# Patient Record
Sex: FEMALE | Employment: FULL TIME | URBAN - METROPOLITAN AREA
[De-identification: names, ages, dates, MRNs, and addresses within clinical notes are randomized per-mention and may not be internally consistent; named-entity substitution may affect disease eponyms.]

---

## 2017-01-17 ENCOUNTER — OFFICE VISIT (OUTPATIENT)
Dept: BARIATRICS/WEIGHT MGMT | Age: 37
End: 2017-01-17

## 2017-01-17 VITALS
OXYGEN SATURATION: 97 % | SYSTOLIC BLOOD PRESSURE: 107 MMHG | DIASTOLIC BLOOD PRESSURE: 58 MMHG | BODY MASS INDEX: 45.03 KG/M2 | HEIGHT: 67 IN | WEIGHT: 286.9 LBS | HEART RATE: 84 BPM

## 2017-01-17 VITALS
SYSTOLIC BLOOD PRESSURE: 107 MMHG | HEIGHT: 67 IN | HEART RATE: 84 BPM | WEIGHT: 286.9 LBS | BODY MASS INDEX: 45.03 KG/M2 | DIASTOLIC BLOOD PRESSURE: 58 MMHG

## 2017-01-17 DIAGNOSIS — G47.33 OSA (OBSTRUCTIVE SLEEP APNEA): ICD-10-CM

## 2017-01-17 DIAGNOSIS — E66.01 MORBID OBESITY WITH BMI OF 40.0-44.9, ADULT (HCC): Primary | ICD-10-CM

## 2017-01-17 DIAGNOSIS — E66.01 OBESITY, CLASS III, BMI 40-49.9 (MORBID OBESITY) (HCC): Primary | ICD-10-CM

## 2017-01-17 DIAGNOSIS — Z71.3 ENCOUNTER FOR NUTRITIONAL COUNSELING: ICD-10-CM

## 2017-01-17 PROCEDURE — 99214 OFFICE O/P EST MOD 30 MIN: CPT | Performed by: NURSE PRACTITIONER

## 2017-01-17 PROCEDURE — 99401 PREV MED CNSL INDIV APPRX 15: CPT | Performed by: NURSE PRACTITIONER

## 2017-01-17 PROCEDURE — 99999 PR OFFICE/OUTPT VISIT,PROCEDURE ONLY: CPT

## 2017-01-17 ASSESSMENT — ENCOUNTER SYMPTOMS
ABDOMINAL PAIN: 0
CHEST TIGHTNESS: 0
NAUSEA: 0
RHINORRHEA: 0
SHORTNESS OF BREATH: 0
WHEEZING: 0
TROUBLE SWALLOWING: 0
EYE PAIN: 0
ABDOMINAL DISTENTION: 0
DIARRHEA: 0

## 2017-02-07 ENCOUNTER — HOSPITAL ENCOUNTER (OUTPATIENT)
Dept: GENERAL RADIOLOGY | Age: 37
Discharge: HOME | End: 2017-02-07
Attending: INTERNAL MEDICINE | Admitting: INTERNAL MEDICINE

## 2017-02-07 DIAGNOSIS — R06.02 SOB (SHORTNESS OF BREATH): ICD-10-CM

## 2017-02-15 ENCOUNTER — OFFICE VISIT (OUTPATIENT)
Dept: BARIATRICS/WEIGHT MGMT | Age: 37
End: 2017-02-15

## 2017-02-15 VITALS
WEIGHT: 286.7 LBS | BODY MASS INDEX: 45 KG/M2 | HEIGHT: 67 IN | DIASTOLIC BLOOD PRESSURE: 73 MMHG | SYSTOLIC BLOOD PRESSURE: 117 MMHG | RESPIRATION RATE: 12 BRPM | HEART RATE: 86 BPM

## 2017-02-15 DIAGNOSIS — E66.01 MORBID OBESITY WITH BMI OF 40.0-44.9, ADULT (HCC): Primary | ICD-10-CM

## 2017-02-15 PROCEDURE — 99214 OFFICE O/P EST MOD 30 MIN: CPT | Performed by: SURGERY

## 2017-02-17 ENCOUNTER — HOSPITAL ENCOUNTER (OUTPATIENT)
Dept: CARDIOLOGY | Age: 37
Discharge: HOME | End: 2017-03-18
Attending: INTERNAL MEDICINE | Admitting: INTERNAL MEDICINE

## 2017-02-17 DIAGNOSIS — K94.31: ICD-10-CM

## 2017-03-08 ENCOUNTER — HOSPITAL ENCOUNTER (OUTPATIENT)
Dept: NUCLEAR MEDICINE | Age: 37
Discharge: HOME | End: 2017-03-08
Attending: INTERNAL MEDICINE | Admitting: INTERNAL MEDICINE

## 2017-03-08 DIAGNOSIS — K94.31: ICD-10-CM

## 2017-03-08 LAB
LV EF: 53 %
LV EF: 74 %
LVEF MODALITY: NORMAL
LVEF MODALITY: NORMAL

## 2017-03-08 RX ADMIN — Medication 30 MILLICURIE: at 12:37

## 2017-03-08 RX ADMIN — Medication 10 MILLICURIE: at 12:37

## 2017-03-14 ENCOUNTER — OFFICE VISIT (OUTPATIENT)
Dept: BARIATRICS/WEIGHT MGMT | Age: 37
End: 2017-03-14

## 2017-03-14 VITALS
SYSTOLIC BLOOD PRESSURE: 108 MMHG | HEART RATE: 95 BPM | BODY MASS INDEX: 44.21 KG/M2 | WEIGHT: 281.7 LBS | DIASTOLIC BLOOD PRESSURE: 63 MMHG | HEIGHT: 67 IN | RESPIRATION RATE: 16 BRPM

## 2017-03-14 DIAGNOSIS — E66.01 MORBID OBESITY WITH BMI OF 40.0-44.9, ADULT (HCC): Primary | ICD-10-CM

## 2017-03-14 DIAGNOSIS — M25.562 CHRONIC PAIN OF BOTH KNEES: ICD-10-CM

## 2017-03-14 DIAGNOSIS — A04.8 HELICOBACTER PYLORI (H. PYLORI): ICD-10-CM

## 2017-03-14 DIAGNOSIS — I10 HTN, GOAL BELOW 140/90: ICD-10-CM

## 2017-03-14 DIAGNOSIS — M25.561 CHRONIC PAIN OF BOTH KNEES: ICD-10-CM

## 2017-03-14 DIAGNOSIS — G89.29 CHRONIC PAIN OF BOTH KNEES: ICD-10-CM

## 2017-03-14 DIAGNOSIS — Z01.818 PRE-OP EVALUATION: ICD-10-CM

## 2017-03-14 DIAGNOSIS — G47.33 OSA (OBSTRUCTIVE SLEEP APNEA): ICD-10-CM

## 2017-03-14 PROCEDURE — 99215 OFFICE O/P EST HI 40 MIN: CPT | Performed by: NURSE PRACTITIONER

## 2017-03-14 RX ORDER — AMOXICILLIN 500 MG/1
1000 TABLET, FILM COATED ORAL 2 TIMES DAILY
Qty: 56 TABLET | Refills: 0 | Status: SHIPPED | OUTPATIENT
Start: 2017-03-14 | End: 2017-03-28

## 2017-03-14 RX ORDER — CLARITHROMYCIN 500 MG/1
500 TABLET, COATED ORAL 2 TIMES DAILY
Qty: 28 TABLET | Refills: 0 | Status: SHIPPED | OUTPATIENT
Start: 2017-03-14 | End: 2017-03-28

## 2017-03-14 ASSESSMENT — ENCOUNTER SYMPTOMS
EYE PAIN: 0
ABDOMINAL DISTENTION: 0
DIARRHEA: 0
RHINORRHEA: 0
BACK PAIN: 1
SHORTNESS OF BREATH: 0
TROUBLE SWALLOWING: 0
NAUSEA: 0
CHEST TIGHTNESS: 0
WHEEZING: 0
ABDOMINAL PAIN: 0

## 2017-04-26 ENCOUNTER — OFFICE VISIT (OUTPATIENT)
Dept: BARIATRICS/WEIGHT MGMT | Age: 37
End: 2017-04-26

## 2017-04-26 VITALS
BODY MASS INDEX: 43.63 KG/M2 | WEIGHT: 278 LBS | DIASTOLIC BLOOD PRESSURE: 63 MMHG | SYSTOLIC BLOOD PRESSURE: 119 MMHG | HEART RATE: 100 BPM | HEIGHT: 67 IN

## 2017-04-26 DIAGNOSIS — R10.11 RUQ ABDOMINAL PAIN: Primary | ICD-10-CM

## 2017-04-26 DIAGNOSIS — E66.01 MORBID OBESITY WITH BMI OF 40.0-44.9, ADULT (HCC): ICD-10-CM

## 2017-04-26 PROCEDURE — 99214 OFFICE O/P EST MOD 30 MIN: CPT | Performed by: SURGERY

## 2017-05-05 ENCOUNTER — HOSPITAL ENCOUNTER (OUTPATIENT)
Dept: ULTRASOUND IMAGING | Age: 37
Discharge: HOME | End: 2017-05-05
Attending: SURGERY | Admitting: SURGERY

## 2017-05-05 DIAGNOSIS — R10.11 ABDOMINAL PAIN, RIGHT UPPER QUADRANT: ICD-10-CM

## 2017-06-05 ENCOUNTER — OFFICE VISIT (OUTPATIENT)
Dept: BARIATRICS/WEIGHT MGMT | Age: 37
End: 2017-06-05

## 2017-06-05 VITALS
HEART RATE: 87 BPM | WEIGHT: 280.7 LBS | SYSTOLIC BLOOD PRESSURE: 124 MMHG | HEIGHT: 67 IN | BODY MASS INDEX: 44.06 KG/M2 | DIASTOLIC BLOOD PRESSURE: 76 MMHG

## 2017-06-05 DIAGNOSIS — G47.33 OSA (OBSTRUCTIVE SLEEP APNEA): Primary | ICD-10-CM

## 2017-06-05 DIAGNOSIS — N39.3 SUI (STRESS URINARY INCONTINENCE, FEMALE): ICD-10-CM

## 2017-06-05 DIAGNOSIS — G89.29 CHRONIC PAIN OF BOTH KNEES: ICD-10-CM

## 2017-06-05 DIAGNOSIS — I10 HTN, GOAL BELOW 140/90: ICD-10-CM

## 2017-06-05 DIAGNOSIS — M25.561 CHRONIC PAIN OF BOTH KNEES: ICD-10-CM

## 2017-06-05 DIAGNOSIS — F41.9 ANXIETY: ICD-10-CM

## 2017-06-05 DIAGNOSIS — E66.01 MORBID OBESITY WITH BMI OF 40.0-44.9, ADULT (HCC): ICD-10-CM

## 2017-06-05 DIAGNOSIS — M25.562 CHRONIC PAIN OF BOTH KNEES: ICD-10-CM

## 2017-06-05 PROCEDURE — 99214 OFFICE O/P EST MOD 30 MIN: CPT | Performed by: NURSE PRACTITIONER

## 2017-06-05 ASSESSMENT — ENCOUNTER SYMPTOMS
EYE PAIN: 0
BACK PAIN: 1
WHEEZING: 0
SHORTNESS OF BREATH: 0
TROUBLE SWALLOWING: 0
DIARRHEA: 0
NAUSEA: 0
RHINORRHEA: 0
ABDOMINAL PAIN: 0
CHEST TIGHTNESS: 0
COUGH: 0
ABDOMINAL DISTENTION: 0

## 2017-06-28 PROBLEM — G47.33 MODERATE OBSTRUCTIVE SLEEP APNEA: Status: ACTIVE | Noted: 2017-06-28

## 2017-07-20 ENCOUNTER — OFFICE VISIT (OUTPATIENT)
Dept: BARIATRICS/WEIGHT MGMT | Age: 37
End: 2017-07-20

## 2017-07-20 VITALS
HEIGHT: 67 IN | SYSTOLIC BLOOD PRESSURE: 121 MMHG | BODY MASS INDEX: 43.18 KG/M2 | HEART RATE: 95 BPM | DIASTOLIC BLOOD PRESSURE: 79 MMHG | WEIGHT: 275.1 LBS

## 2017-07-20 DIAGNOSIS — E66.01 MORBID OBESITY WITH BMI OF 40.0-44.9, ADULT (HCC): Primary | ICD-10-CM

## 2017-07-20 PROCEDURE — 99214 OFFICE O/P EST MOD 30 MIN: CPT | Performed by: SURGERY

## 2017-07-20 RX ORDER — TRAMADOL HYDROCHLORIDE 50 MG/1
50 TABLET ORAL EVERY 6 HOURS PRN
COMMUNITY
End: 2017-08-29

## 2017-07-20 RX ORDER — HYDROCODONE BITARTRATE AND ACETAMINOPHEN 5; 325 MG/1; MG/1
1 TABLET ORAL EVERY 8 HOURS PRN
Status: ON HOLD | COMMUNITY
End: 2017-09-06 | Stop reason: HOSPADM

## 2017-08-15 ENCOUNTER — TELEPHONE (OUTPATIENT)
Dept: BARIATRICS/WEIGHT MGMT | Age: 37
End: 2017-08-15

## 2017-08-21 ENCOUNTER — OFFICE VISIT (OUTPATIENT)
Dept: BARIATRICS/WEIGHT MGMT | Age: 37
End: 2017-08-21

## 2017-08-21 VITALS
BODY MASS INDEX: 43.27 KG/M2 | SYSTOLIC BLOOD PRESSURE: 145 MMHG | HEIGHT: 67 IN | HEART RATE: 107 BPM | DIASTOLIC BLOOD PRESSURE: 73 MMHG | WEIGHT: 275.7 LBS

## 2017-08-21 DIAGNOSIS — E66.01 MORBID OBESITY WITH BMI OF 40.0-44.9, ADULT (HCC): Primary | ICD-10-CM

## 2017-08-21 PROCEDURE — 99999 PR OFFICE/OUTPT VISIT,PROCEDURE ONLY: CPT

## 2017-08-21 RX ORDER — FEEDER CONTAINER WITH PUMP SET
1 EACH MISCELLANEOUS 4 TIMES DAILY
Qty: 120 CAN | Refills: 0 | Status: SHIPPED | OUTPATIENT
Start: 2017-08-21 | End: 2017-12-18

## 2017-08-29 ENCOUNTER — TELEPHONE (OUTPATIENT)
Dept: BARIATRICS/WEIGHT MGMT | Age: 37
End: 2017-08-29

## 2017-08-29 ENCOUNTER — HOSPITAL ENCOUNTER (OUTPATIENT)
Dept: PREADMISSION TESTING | Age: 37
Discharge: HOME | End: 2017-08-29
Attending: SURGERY | Admitting: SURGERY

## 2017-08-29 VITALS
SYSTOLIC BLOOD PRESSURE: 113 MMHG | OXYGEN SATURATION: 97 % | HEIGHT: 68 IN | HEART RATE: 88 BPM | TEMPERATURE: 96.9 F | WEIGHT: 266.25 LBS | DIASTOLIC BLOOD PRESSURE: 77 MMHG | RESPIRATION RATE: 16 BRPM | BODY MASS INDEX: 40.35 KG/M2

## 2017-08-29 LAB
ANION GAP SERPL CALCULATED.3IONS-SCNC: 14 MMOL/L (ref 4–16)
BASOPHILS ABSOLUTE: 0.1 K/CU MM
BASOPHILS RELATIVE PERCENT: 0.7 % (ref 0–1)
BUN BLDV-MCNC: 13 MG/DL (ref 6–23)
CALCIUM SERPL-MCNC: 9.4 MG/DL (ref 8.3–10.6)
CHLORIDE BLD-SCNC: 100 MMOL/L (ref 99–110)
CO2: 20 MMOL/L (ref 21–32)
CREAT SERPL-MCNC: 0.7 MG/DL (ref 0.6–1.1)
DIFFERENTIAL TYPE: ABNORMAL
EOSINOPHILS ABSOLUTE: 0.3 K/CU MM
EOSINOPHILS RELATIVE PERCENT: 4.1 % (ref 0–3)
GFR AFRICAN AMERICAN: >60 ML/MIN/1.73M2
GFR NON-AFRICAN AMERICAN: >60 ML/MIN/1.73M2
GLUCOSE BLD-MCNC: 95 MG/DL (ref 70–140)
HCT VFR BLD CALC: 42.5 % (ref 37–47)
HEMOGLOBIN: 13.1 GM/DL (ref 12.5–16)
IMMATURE NEUTROPHIL %: 0.1 % (ref 0–0.43)
LYMPHOCYTES ABSOLUTE: 3.2 K/CU MM
LYMPHOCYTES RELATIVE PERCENT: 45.1 % (ref 24–44)
MCH RBC QN AUTO: 27.6 PG (ref 27–31)
MCHC RBC AUTO-ENTMCNC: 30.8 % (ref 32–36)
MCV RBC AUTO: 89.5 FL (ref 78–100)
MONOCYTES ABSOLUTE: 0.5 K/CU MM
MONOCYTES RELATIVE PERCENT: 7.2 % (ref 0–4)
NUCLEATED RBC %: 0 %
PDW BLD-RTO: 12.6 % (ref 11.7–14.9)
PLATELET # BLD: 306 K/CU MM (ref 140–440)
PMV BLD AUTO: 9.9 FL (ref 7.5–11.1)
POTASSIUM SERPL-SCNC: 4.1 MMOL/L (ref 3.5–5.1)
RBC # BLD: 4.75 M/CU MM (ref 4.2–5.4)
SEGMENTED NEUTROPHILS ABSOLUTE COUNT: 3 K/CU MM
SEGMENTED NEUTROPHILS RELATIVE PERCENT: 42.8 % (ref 36–66)
SODIUM BLD-SCNC: 134 MMOL/L (ref 135–145)
TOTAL IMMATURE NEUTOROPHIL: 0.01 K/CU MM
TOTAL NUCLEATED RBC: 0 K/CU MM
WBC # BLD: 7.1 K/CU MM (ref 4–10.5)

## 2017-08-31 LAB
EKG ATRIAL RATE: 90 BPM
EKG DIAGNOSIS: NORMAL
EKG P AXIS: 46 DEGREES
EKG P-R INTERVAL: 182 MS
EKG Q-T INTERVAL: 374 MS
EKG QRS DURATION: 94 MS
EKG QTC CALCULATION (BAZETT): 457 MS
EKG R AXIS: 33 DEGREES
EKG T AXIS: 15 DEGREES
EKG VENTRICULAR RATE: 90 BPM

## 2017-09-01 ENCOUNTER — OFFICE VISIT (OUTPATIENT)
Dept: BARIATRICS/WEIGHT MGMT | Age: 37
End: 2017-09-01

## 2017-09-01 VITALS
RESPIRATION RATE: 16 BRPM | BODY MASS INDEX: 41.8 KG/M2 | WEIGHT: 266.3 LBS | SYSTOLIC BLOOD PRESSURE: 114 MMHG | DIASTOLIC BLOOD PRESSURE: 73 MMHG | HEIGHT: 67 IN | HEART RATE: 97 BPM

## 2017-09-01 DIAGNOSIS — E66.01 MORBID OBESITY WITH BMI OF 40.0-44.9, ADULT (HCC): Primary | ICD-10-CM

## 2017-09-01 PROCEDURE — 99999 PR OFFICE/OUTPT VISIT,PROCEDURE ONLY: CPT | Performed by: NURSE PRACTITIONER

## 2017-09-11 ENCOUNTER — TELEPHONE (OUTPATIENT)
Dept: BARIATRICS/WEIGHT MGMT | Age: 37
End: 2017-09-11

## 2017-09-12 ENCOUNTER — TELEPHONE (OUTPATIENT)
Dept: BARIATRICS/WEIGHT MGMT | Age: 37
End: 2017-09-12

## 2017-09-14 ENCOUNTER — OFFICE VISIT (OUTPATIENT)
Dept: BARIATRICS/WEIGHT MGMT | Age: 37
End: 2017-09-14

## 2017-09-14 VITALS
HEART RATE: 98 BPM | WEIGHT: 254.1 LBS | BODY MASS INDEX: 39.88 KG/M2 | HEIGHT: 67 IN | RESPIRATION RATE: 16 BRPM | SYSTOLIC BLOOD PRESSURE: 105 MMHG | DIASTOLIC BLOOD PRESSURE: 67 MMHG

## 2017-09-14 DIAGNOSIS — Z98.84 STATUS POST LAPAROSCOPIC SLEEVE GASTRECTOMY: ICD-10-CM

## 2017-09-14 DIAGNOSIS — Z98.84 STATUS POST BARIATRIC SURGERY: Primary | ICD-10-CM

## 2017-09-14 PROCEDURE — 99024 POSTOP FOLLOW-UP VISIT: CPT | Performed by: SURGERY

## 2017-09-14 RX ORDER — IBUPROFEN 200 MG
1 CAPSULE ORAL 2 TIMES DAILY PRN
COMMUNITY
End: 2021-09-28

## 2017-09-14 RX ORDER — LANOLIN ALCOHOL/MO/W.PET/CERES
1000 CREAM (GRAM) TOPICAL DAILY
COMMUNITY
End: 2021-09-28

## 2017-09-22 ENCOUNTER — TELEPHONE (OUTPATIENT)
Dept: BARIATRICS/WEIGHT MGMT | Age: 37
End: 2017-09-22

## 2017-09-28 ENCOUNTER — TELEPHONE (OUTPATIENT)
Dept: BARIATRICS/WEIGHT MGMT | Age: 37
End: 2017-09-28

## 2017-10-18 ENCOUNTER — OFFICE VISIT (OUTPATIENT)
Dept: BARIATRICS/WEIGHT MGMT | Age: 37
End: 2017-10-18

## 2017-10-18 VITALS
BODY MASS INDEX: 39.03 KG/M2 | HEIGHT: 67 IN | WEIGHT: 248.7 LBS | HEART RATE: 128 BPM | SYSTOLIC BLOOD PRESSURE: 102 MMHG | DIASTOLIC BLOOD PRESSURE: 74 MMHG

## 2017-10-18 DIAGNOSIS — Z98.84 STATUS POST LAPAROSCOPIC SLEEVE GASTRECTOMY: Primary | ICD-10-CM

## 2017-10-18 DIAGNOSIS — Z98.84 STATUS POST BARIATRIC SURGERY: ICD-10-CM

## 2017-10-18 PROCEDURE — 99024 POSTOP FOLLOW-UP VISIT: CPT | Performed by: SURGERY

## 2017-10-18 NOTE — LETTER
BOWEL REGIMEN:    AVOID CONSTIPATION:  High fiber diet 25-30 gm daily  Plenty of fluids daily  Metamucil: after lunch daily  Senokot-S: 2 daily or twice daily  Exercise daily  Milk of magnesia: prn  Miralax 17 gm: daily prn  Magnesium citrate: prn    If still no bowel movements, consider Fleet enemas.

## 2017-10-18 NOTE — PROGRESS NOTES
BARIATRIC SURGERY POST OPERATIVE NOTE    SUBJECTIVE:    Patient presenting today referred from Hansel Black MD, for   Chief Complaint   Patient presents with   [facility] Post Op Follow Up     PO #2 gastrectomy sleeve laparoscopic    . /74   Pulse 128   Ht 5' 7\" (1.702 m)   Wt 248 lb 11.2 oz (112.8 kg)   LMP 10/06/2017   BMI 38.95 kg/m²      HPI: Dawn Mccabe is a 40 y.o. female presenting in second     Date of Surgery: 9/5/17    Type of Surgery: Gastrectomy sleeve laparoscopic    BMI: Body mass index is 38.95 kg/m². Obesity Classification: Class II  Today's weight is 248lbs, last visits weight was   Wt Readings from Last 3 Encounters:   10/18/17 248 lb 11.2 oz (112.8 kg)   10/12/17 250 lb (113.4 kg)   09/14/17 254 lb 1.6 oz (115.3 kg)   , total gain/loss is -35.3lbs    Weight History: Wt Readings from Last 3 Encounters:   10/18/17 248 lb 11.2 oz (112.8 kg)   10/12/17 250 lb (113.4 kg)   09/14/17 254 lb 1.6 oz (115.3 kg)       Total weight loss/gain -5.4 Lbs over 1 month. 4 meals daily and snacks  -   Constipated -  referred pain from overeating counseled. How pleased are you with your current weight loss: Not very pleased  If you are disappointed, what do you think is preventing you from increased weight loss? No sure  Is patient experiencing any physical problems post surgery: left shoulder hurts when eating  Is patient experiencing any dietary problems post surgery: Yes not able to have bowel moves  Food Intolerances: Denies any food intolerances since last seen. How hungry are you? Get more hungry in evening  How full do you feel after eating? Most of the time  How fast do you eat? Eats fast  Food log brought to appointment today: No    Breakfast: yes  Mid-AM Snack: yes  Lunch: yes  Mid-PM Snack: yes  Dinner: yes  Evening Snack: no    Liquids Consumed: 32-48 oz per day. Times of day liquids consumed:   Patient taking protein supplement: yyes.   Brand of Supplement: ensure shakes  Patient taking multivitamins: Yes  Does patient exercise: rarely  What prevents you from exercising: not knowing what she can do      Current Outpatient Prescriptions:     naproxen (NAPROSYN) 500 MG tablet, Take 1 tablet by mouth 2 times daily as needed for Pain, Disp: 20 tablet, Rfl: 0    cyclobenzaprine (FLEXERIL) 10 MG tablet, Take 1 tablet by mouth 3 times daily as needed for Muscle spasms, Disp: 20 tablet, Rfl: 0    calcium 600 MG TABS tablet, Take 1 tablet by mouth 2 times daily as needed, Disp: , Rfl:     Polyethylene Glycol 3350 (MIRALAX PO), Take by mouth, Disp: , Rfl:     zinc 50 MG CAPS, Take 1 capsule by mouth daily, Disp: , Rfl:     Ferrous Sulfate (IRON) 142 (45 Fe) MG TBCR, Take by mouth, Disp: , Rfl:     vitamin B-12 (CYANOCOBALAMIN) 1000 MCG tablet, Take 1,000 mcg by mouth daily, Disp: , Rfl:     Multiple Vitamins-Minerals (HM MULTIVITAMIN ADULT GUMMY PO), Take 2 Doses by mouth, Disp: , Rfl:     HYDROcodone-acetaminophen (NORCO) 5-325 MG per tablet, Take 1 tablet by mouth every 6 hours as needed for Pain ., Disp: 35 tablet, Rfl: 0    pantoprazole (PROTONIX) 40 MG tablet, Take 1 tablet by mouth 2 times daily, Disp: 60 tablet, Rfl: 3    ondansetron (ZOFRAN ODT) 4 MG disintegrating tablet, Take 1 tablet by mouth every 4 hours as needed for Nausea or Vomiting, Disp: 30 tablet, Rfl: 3    Nutritional Supplements (ENSURE HIGH PROTEIN) LIQD, Take 1 Can by mouth 4 times daily Pre op diet.  No other foods, plenty of water., Disp: 120 Can, Rfl: 0    CPAP Machine MISC, by CPAP route nightly , Disp: , Rfl:     aspirin 81 MG tablet, Take 81 mg by mouth every morning Over The Counter, Disp: , Rfl:   Past Medical History:   Diagnosis Date    Anemia     Anxiety     Arthritis     \"Left Hip\"    Diabetes ([address] Dx 2008    Hiatal hernia     Hyperlipidemia     \"They Put Me On Lipitor Because I'm Diabetic, I Have Never Had High Cholesterol\"    Sleep apnea     Uses CPAP    Teeth missing

## 2017-10-26 ENCOUNTER — OFFICE VISIT (OUTPATIENT)
Dept: BARIATRICS/WEIGHT MGMT | Age: 37
End: 2017-10-26

## 2017-10-26 VITALS
DIASTOLIC BLOOD PRESSURE: 74 MMHG | WEIGHT: 245.3 LBS | RESPIRATION RATE: 16 BRPM | HEIGHT: 67 IN | SYSTOLIC BLOOD PRESSURE: 118 MMHG | BODY MASS INDEX: 38.5 KG/M2 | HEART RATE: 94 BPM

## 2017-10-26 DIAGNOSIS — E66.9 OBESITY (BMI 30-39.9): ICD-10-CM

## 2017-10-26 DIAGNOSIS — Z98.84 S/P LAPAROSCOPIC SLEEVE GASTRECTOMY: Primary | ICD-10-CM

## 2017-10-26 DIAGNOSIS — M25.512 LEFT SHOULDER PAIN, UNSPECIFIED CHRONICITY: ICD-10-CM

## 2017-10-26 DIAGNOSIS — Z98.890 S/P HERNIA REPAIR: ICD-10-CM

## 2017-10-26 DIAGNOSIS — Z87.19 S/P HERNIA REPAIR: ICD-10-CM

## 2017-10-26 PROCEDURE — 99024 POSTOP FOLLOW-UP VISIT: CPT | Performed by: SURGERY

## 2017-10-26 NOTE — PROGRESS NOTES
dentures     Upper      Past Surgical History:   Procedure Laterality Date    BREAST BIOPSY Bilateral 2006    Benign    DENTAL SURGERY      Teeth Extracted In The Past    ENDOSCOPY, COLON, DIAGNOSTIC  2017    HERNIA REPAIR      SLEEVE GASTRECTOMY  09/05/2017    Robotic sleeve gastrectomy and hiatal hernia repair    TUBAL LIGATION  2005     Social History     Social History    Marital status: Single     Spouse name: N/A    Number of children: N/A    Years of education: N/A     Social History Main Topics    Smoking status: Former Smoker     Packs/day: 0.50     Years: 13.00     Types: Cigarettes     Start date: 1995     Quit date: 2008    Smokeless tobacco: Never Used    Alcohol use Yes      Comment: \"Occ. Maybe Once A Year\"    Drug use: No    Sexual activity: Not Currently     Other Topics Concern    None     Social History Narrative    None     Review of Systems      OBJECTIVE:    Physical Exam    Assessment / Plan:    1. S/P laparoscopic sleeve gastrectomy    2. S/P hernia repair    3. Obesity (BMI 30-39.9)    4. Left shoulder pain, unspecified chronicity      /74 (Site: Left Arm, Position: Sitting, Cuff Size: Large Adult)   Pulse 94   Resp 16   Ht 5' 7\" (1.702 m)   Wt 245 lb 4.8 oz (111.3 kg)   LMP 10/06/2017   BMI 38.42 kg/m²        Total weight loss/gain -21.0 Lbs over 2 month. 800-1000 clint / d  Will shoot for 600-800    32 from the prtn 40 >    Well hydrated 50+ Lbs.  LT shoulder pain - WILL EVAL NEXT MO IF persists. Follow Up:  Return in about 4 weeks (around 11/23/2017) for Bariatric follow up: diet, exercise & weight loss, Post operative check, Follow up Symptoms.     Virginia Scruggs MD, FACS, FICS  Member of the [employer] of [employer] and [employer]    [phone]    10/26/17

## 2017-12-06 ENCOUNTER — OFFICE VISIT (OUTPATIENT)
Dept: BARIATRICS/WEIGHT MGMT | Age: 37
End: 2017-12-06

## 2017-12-06 VITALS
DIASTOLIC BLOOD PRESSURE: 76 MMHG | WEIGHT: 242.7 LBS | RESPIRATION RATE: 16 BRPM | HEIGHT: 67 IN | SYSTOLIC BLOOD PRESSURE: 115 MMHG | BODY MASS INDEX: 38.09 KG/M2 | HEART RATE: 86 BPM

## 2017-12-06 DIAGNOSIS — Z98.84 STATUS POST LAPAROSCOPIC SLEEVE GASTRECTOMY: Primary | ICD-10-CM

## 2017-12-06 PROCEDURE — 99214 OFFICE O/P EST MOD 30 MIN: CPT | Performed by: SURGERY

## 2017-12-06 PROCEDURE — G8484 FLU IMMUNIZE NO ADMIN: HCPCS | Performed by: SURGERY

## 2017-12-06 PROCEDURE — G8417 CALC BMI ABV UP PARAM F/U: HCPCS | Performed by: SURGERY

## 2017-12-06 PROCEDURE — G8427 DOCREV CUR MEDS BY ELIG CLIN: HCPCS | Performed by: SURGERY

## 2017-12-06 PROCEDURE — 1036F TOBACCO NON-USER: CPT | Performed by: SURGERY

## 2017-12-06 ASSESSMENT — ENCOUNTER SYMPTOMS
SORE THROAT: 0
PHOTOPHOBIA: 0
VOICE CHANGE: 0
BLOOD IN STOOL: 0
COLOR CHANGE: 0
NAUSEA: 0
CONSTIPATION: 0
COUGH: 0
WHEEZING: 0
TROUBLE SWALLOWING: 0
ABDOMINAL PAIN: 0
ANAL BLEEDING: 0
VOMITING: 0
DIARRHEA: 0
SHORTNESS OF BREATH: 0

## 2017-12-06 NOTE — PROGRESS NOTES
BARIATRIC SURGERY POST OPERATIVE NOTE    SUBJECTIVE:    Patient presenting today referred from Sydni Andrew MD, for   Chief Complaint   Patient presents with   [facility] Post Op Follow Up     PO#4 OR 9/5/17 sleeve     /76 (Site: Right Arm, Position: Sitting, Cuff Size: Large Adult)   Pulse 86   Resp 16   Ht 5' 7\" (1.702 m)   Wt 242 lb 11.2 oz (110.1 kg)   LMP 12/04/2017   BMI 38.01 kg/m²      HPI: Elroy Crabtree is a 40 y.o. female presenting in fourth, follow up 3 months post op 9/5/17. Date of Surgery: 9/5/17    Type of Surgery:   Laparoscopic robotic DaVinci-assisted sleeve gastrectomy around a  38-E[place]ian bougie + Hiatal hernia primary repair.       BMI:  Obesity Classification: Class II  Today's weight is 242.7 lbs, last visits weight was   Wt Readings from Last 3 Encounters:   12/06/17 242 lb 11.2 oz (110.1 kg)   10/26/17 245 lb 4.8 oz (111.3 kg)   10/18/17 248 lb 11.2 oz (112.8 kg)   , total gain/loss is -2.6 lbs    Weight History: Wt Readings from Last 3 Encounters:   12/06/17 242 lb 11.2 oz (110.1 kg)   10/26/17 245 lb 4.8 oz (111.3 kg)   10/18/17 248 lb 11.2 oz (112.8 kg)       Total weight loss/gain -23.6 Lbs over 3 month. Back on cappuccino - counseled -   More than 1000 clint counseled in length will go back to 800 Kcal/d  Exercises 3 x / wk. How pleased are you with your current weight loss: \"feels like I should be losing more\"  If you are disappointed, what do you think is preventing you from increased weight loss? Is patient experiencing any physical problems post surgery: No:   Is patient experiencing any dietary problems post surgery: No:   Food Intolerances: Is not tolerating  pasta and breads. How hungry are you? Very hungry  How full do you feel after eating? full  How fast do you eat?  30-40 min  Food log brought to appointment today: No    Breakfast: yes  Mid-AM Snack: no  Lunch: no  Mid-PM Snack: yes  Dinner: yes  Evening Snack: yes    Liquids  Wears partial dentures     Upper      Past Surgical History:   Procedure Laterality Date    BREAST BIOPSY Bilateral 2006    Benign    DENTAL SURGERY      Teeth Extracted In The Past    ENDOSCOPY, COLON, DIAGNOSTIC  2017    HERNIA REPAIR      SLEEVE GASTRECTOMY  09/05/2017    Robotic sleeve gastrectomy and hiatal hernia repair    TUBAL LIGATION  2005     Social History     Social History    Marital status: Single     Spouse name: N/A    Number of children: N/A    Years of education: N/A     Social History Main Topics    Smoking status: Former Smoker     Packs/day: 0.50     Years: 13.00     Types: Cigarettes     Start date: 1995     Quit date: 2008    Smokeless tobacco: Never Used    Alcohol use Yes      Comment: \"Occ. Maybe Once A Year\"    Drug use: No    Sexual activity: Not Currently     Other Topics Concern    None     Social History Narrative    None     Review of Systems   Constitutional: Negative for activity change, chills, diaphoresis and fever. HENT: Negative for sore throat, trouble swallowing and voice change. Eyes: Negative for photophobia and visual disturbance. Respiratory: Negative for cough, shortness of breath and wheezing. Cardiovascular: Negative for chest pain, palpitations and leg swelling. Gastrointestinal: Negative for abdominal pain, anal bleeding, blood in stool, constipation, diarrhea, nausea and vomiting. Endocrine: Negative for cold intolerance, heat intolerance, polydipsia and polyuria. Genitourinary: Negative for dysuria, frequency and hematuria. Musculoskeletal: Negative for joint swelling, myalgias and neck stiffness. Skin: Negative for color change and rash. Neurological: Negative for seizures, speech difficulty, light-headedness and numbness. Hematological: Negative for adenopathy. Does not bruise/bleed easily. OBJECTIVE:    Physical Exam   Constitutional: She is oriented to person, place, and time.  She appears well-developed and

## 2017-12-19 ENCOUNTER — TELEPHONE (OUTPATIENT)
Dept: BARIATRICS/WEIGHT MGMT | Age: 37
End: 2017-12-19

## 2018-01-17 ENCOUNTER — OFFICE VISIT (OUTPATIENT)
Dept: BARIATRICS/WEIGHT MGMT | Age: 38
End: 2018-01-17

## 2018-01-17 VITALS
SYSTOLIC BLOOD PRESSURE: 108 MMHG | HEART RATE: 94 BPM | WEIGHT: 231.6 LBS | DIASTOLIC BLOOD PRESSURE: 70 MMHG | BODY MASS INDEX: 36.35 KG/M2 | HEIGHT: 67 IN

## 2018-01-17 DIAGNOSIS — R63.4 WEIGHT LOSS: ICD-10-CM

## 2018-01-17 DIAGNOSIS — Z98.84 STATUS POST LAPAROSCOPIC SLEEVE GASTRECTOMY: Primary | ICD-10-CM

## 2018-01-17 PROCEDURE — G8427 DOCREV CUR MEDS BY ELIG CLIN: HCPCS | Performed by: SURGERY

## 2018-01-17 PROCEDURE — G8484 FLU IMMUNIZE NO ADMIN: HCPCS | Performed by: SURGERY

## 2018-01-17 PROCEDURE — 1036F TOBACCO NON-USER: CPT | Performed by: SURGERY

## 2018-01-17 PROCEDURE — G8417 CALC BMI ABV UP PARAM F/U: HCPCS | Performed by: SURGERY

## 2018-01-17 PROCEDURE — 99214 OFFICE O/P EST MOD 30 MIN: CPT | Performed by: SURGERY

## 2018-01-17 ASSESSMENT — ENCOUNTER SYMPTOMS
SORE THROAT: 0
DIARRHEA: 0
NAUSEA: 0
VOICE CHANGE: 0
ANAL BLEEDING: 0
ABDOMINAL PAIN: 0
PHOTOPHOBIA: 0
WHEEZING: 0
COUGH: 0
TROUBLE SWALLOWING: 0
VOMITING: 0
CONSTIPATION: 0
SHORTNESS OF BREATH: 0
COLOR CHANGE: 0
BLOOD IN STOOL: 0

## 2018-01-25 ENCOUNTER — TELEPHONE (OUTPATIENT)
Dept: BARIATRICS/WEIGHT MGMT | Age: 38
End: 2018-01-25

## 2018-02-09 ENCOUNTER — HOSPITAL ENCOUNTER (OUTPATIENT)
Dept: LAB | Age: 38
Discharge: HOME | End: 2018-02-09
Attending: SURGERY | Admitting: SURGERY

## 2018-02-09 LAB
ALBUMIN SERPL-MCNC: 4.2 GM/DL (ref 3.4–5)
ALP BLD-CCNC: 66 IU/L (ref 40–129)
ALT SERPL-CCNC: 11 U/L (ref 10–40)
ANION GAP SERPL CALCULATED.3IONS-SCNC: 11 MMOL/L (ref 4–16)
AST SERPL-CCNC: 15 IU/L (ref 15–37)
BASOPHILS ABSOLUTE: 0 K/CU MM
BASOPHILS RELATIVE PERCENT: 0.6 % (ref 0–1)
BILIRUB SERPL-MCNC: 0.5 MG/DL (ref 0–1)
BILIRUBIN DIRECT: 0.2 MG/DL (ref 0–0.3)
BILIRUBIN, INDIRECT: 0.3 MG/DL (ref 0–0.7)
BUN BLDV-MCNC: 12 MG/DL (ref 6–23)
CALCIUM SERPL-MCNC: 9.2 MG/DL (ref 8.3–10.6)
CHLORIDE BLD-SCNC: 104 MMOL/L (ref 99–110)
CO2: 28 MMOL/L (ref 21–32)
CREAT SERPL-MCNC: 0.6 MG/DL (ref 0.6–1.1)
DIFFERENTIAL TYPE: ABNORMAL
EOSINOPHILS ABSOLUTE: 0.1 K/CU MM
EOSINOPHILS RELATIVE PERCENT: 2.8 % (ref 0–3)
ESTIMATED AVERAGE GLUCOSE: 105 MG/DL
GFR AFRICAN AMERICAN: >60 ML/MIN/1.73M2
GFR NON-AFRICAN AMERICAN: >60 ML/MIN/1.73M2
GLUCOSE BLD-MCNC: 80 MG/DL (ref 70–99)
HBA1C MFR BLD: 5.3 % (ref 4.2–6.3)
HCT VFR BLD CALC: 31.9 % (ref 37–47)
HEMOGLOBIN: 10.1 GM/DL (ref 12.5–16)
IMMATURE NEUTROPHIL %: 0.2 % (ref 0–0.43)
LYMPHOCYTES ABSOLUTE: 2.5 K/CU MM
LYMPHOCYTES RELATIVE PERCENT: 49.6 % (ref 24–44)
MAGNESIUM: 1.9 MG/DL (ref 1.8–2.4)
MCH RBC QN AUTO: 25.6 PG (ref 27–31)
MCHC RBC AUTO-ENTMCNC: 31.7 % (ref 32–36)
MCV RBC AUTO: 80.8 FL (ref 78–100)
MONOCYTES ABSOLUTE: 0.4 K/CU MM
MONOCYTES RELATIVE PERCENT: 7 % (ref 0–4)
NUCLEATED RBC %: 0 %
PDW BLD-RTO: 15.8 % (ref 11.7–14.9)
PLATELET # BLD: 242 K/CU MM (ref 140–440)
PMV BLD AUTO: 10.5 FL (ref 7.5–11.1)
POTASSIUM SERPL-SCNC: 4 MMOL/L (ref 3.5–5.1)
RBC # BLD: 3.95 M/CU MM (ref 4.2–5.4)
SEGMENTED NEUTROPHILS ABSOLUTE COUNT: 2 K/CU MM
SEGMENTED NEUTROPHILS RELATIVE PERCENT: 39.8 % (ref 36–66)
SODIUM BLD-SCNC: 143 MMOL/L (ref 135–145)
TOTAL IMMATURE NEUTOROPHIL: 0.01 K/CU MM
TOTAL NUCLEATED RBC: 0 K/CU MM
TOTAL PROTEIN: 7 GM/DL (ref 6.4–8.2)
VITAMIN D 25-HYDROXY: 26.56 NG/ML
WBC # BLD: 5 K/CU MM (ref 4–10.5)

## 2018-02-11 LAB — ZINC: 81

## 2018-02-12 ENCOUNTER — HOSPITAL ENCOUNTER (OUTPATIENT)
Dept: PHYSICAL THERAPY | Age: 38
Discharge: HOME | End: 2018-02-28
Attending: NURSE PRACTITIONER | Admitting: NURSE PRACTITIONER

## 2018-02-12 NOTE — FLOWSHEET NOTE
Physical Therapy Daily Treatment Note  Date:  2018    Patient Name:  Rylee Juarez    :  1980  MRN: [id number]  Restrictions/Precautions:  40 lb lifting  Diagnosis: lumbar strain     Insurance/Certification information:  Workers comp ends 18  10 visits  Next Physician Visit:  Referring Physician:  Allison Enciso [facility] EXPIRATION DATE  Visit# / total visits:  1/      10  C9 ends 18            Initial Pain level: 0 to 6/10  Right SI joint  Subjective:    Exam: range of motion, strength, posture, SLR, DTRs, SI tests  Clinical Presentation: evolving  Pt presents with 0 to 6/10 right SI joint pain that limits her from lifitng , walking, standing.   today she started with Serola SI belt to stabilize SI joint, and strengthening exs. Patient Education: written  Any changes to Ambulatory Summary Sheet?              Goals:     Long term goals  Time Frame for Long term goals : 60 days  Long term goal 1: indep with home program  Long term goal 2: 0 to 2/10 pain  Long term goal 3: able to improve functional score by 5 points on Oswestry  Patient's goal: no pain  Skilled PT activities: Date  18  Date Date Date     Outcome measure used          On visit#\oswetry 10/50         serola SI belt  Purpose, wearing schedule, explained, pt tried it for 20 min and walking,         Lat pull backs, Rows  30 lb 10x  30 lb 10x   Pt goes to [employer]        Wall slide 2x 20 sec        Lat pull downs sitting facing out       Lat pull backs standing with elbows ext         quadraped arm raises only, not legs        pelvic tilts supine                                                                   Progress/goals assessment (every 10 visits) by  PT           HEP: 3 exs and belt      Objective   findings: See presentation      Provider interaction:        Response to intervention:   comfortable      Plan for Next Session: Check SI belt and advance exs        Modality/intervention used:  [x]

## 2018-02-12 NOTE — PROGRESS NOTES
Co-treatment   Time In 1430         Time Out 1522         Minutes 52         Timed Code Treatment Minutes: [address], PT

## 2018-02-12 NOTE — PLAN OF CARE
Outpatient Physical Therapy        [] Phone: [phone]   Fax: [phone]   Pediatric Therapy          [] Phone: [phone]   Fax: [phone]  Pediatric [place]          [] Phone: [phone]   Fax: [phone]      To: Referring Practitioner: Allyson Mcclellan CNP    From: Arline Quijano, PT     Patient: Darion Duong       : 1980  Diagnosis: lumbar strain       Date: 2018    Physical Therapy Certification/Re-Certification Form  Dear Dr. Qing Currie following patient has been evaluated for physical therapy services and for therapy to continue, Please review the attached evaluation and/or summary of the patient's plan of care, and verify that you agree therapy should continue by signing the attached document and sending it back to our office. Assessment:Exam: range of motion, strength, posture, SLR, DTRs, SI tests  Clinical Presentation: evolving  Pt presents with 0 to 6/10 right SI joint pain that limits her from lifitng , walking, standing.   today she started with Serola SI belt to stabilize SI joint, and strengthening exs. Plan of Care/Treatment to date:  [x] Therapeutic Exercise    [] Aquatics:  [] Therapeutic Activity    [] Ultrasound  [] Elec Stimulation  [] Gait Training     [] Cervical Traction [] Lumbar Traction  [x] Neuromuscular Re-education [] Cold/hotpack [] Iontophoresis   [x] Instruction in HEP       [] Manual Therapy     [] vasopneumatic            [] Self care home management        []Dry needling trigger point point/pain management    ? Frequency/Duration:  # Days per week: [x] 1 day # Weeks: [] 1 week [x] 5 weeks     [x] 2 days?    [] 2 weeks [] 6 weeks     [] 3 days   [] 3 weeks [] 7 weeks     [] 4 days   [] 4 weeks [] 8 weeks    Rehab Potential/Progress: [] Excellent [x] Good [] Fair  [] Poor     Goals:       Long term goals  Time Frame for Long term goals : 60 days  Long term goal 1: indep with home program  Long term goal 2: 0 to 2/10 pain  Long term goal 3: able

## 2018-02-27 ENCOUNTER — OFFICE VISIT (OUTPATIENT)
Dept: BARIATRICS/WEIGHT MGMT | Age: 38
End: 2018-02-27

## 2018-02-27 ENCOUNTER — TELEPHONE (OUTPATIENT)
Dept: BARIATRICS/WEIGHT MGMT | Age: 38
End: 2018-02-27

## 2018-02-27 VITALS
HEIGHT: 67 IN | SYSTOLIC BLOOD PRESSURE: 110 MMHG | WEIGHT: 230 LBS | BODY MASS INDEX: 36.1 KG/M2 | RESPIRATION RATE: 16 BRPM | DIASTOLIC BLOOD PRESSURE: 72 MMHG | HEART RATE: 91 BPM

## 2018-02-27 DIAGNOSIS — Z98.84 STATUS POST LAPAROSCOPIC SLEEVE GASTRECTOMY: ICD-10-CM

## 2018-02-27 DIAGNOSIS — E66.01 MORBID OBESITY WITH BMI OF 40.0-44.9, ADULT (HCC): Primary | ICD-10-CM

## 2018-02-27 DIAGNOSIS — D64.9 ANEMIA, UNSPECIFIED TYPE: Primary | ICD-10-CM

## 2018-02-27 DIAGNOSIS — G47.33 OSA (OBSTRUCTIVE SLEEP APNEA): ICD-10-CM

## 2018-02-27 DIAGNOSIS — D64.9 ANEMIA, UNSPECIFIED TYPE: ICD-10-CM

## 2018-02-27 PROCEDURE — G8427 DOCREV CUR MEDS BY ELIG CLIN: HCPCS | Performed by: NURSE PRACTITIONER

## 2018-02-27 PROCEDURE — G8417 CALC BMI ABV UP PARAM F/U: HCPCS | Performed by: NURSE PRACTITIONER

## 2018-02-27 PROCEDURE — 99214 OFFICE O/P EST MOD 30 MIN: CPT | Performed by: NURSE PRACTITIONER

## 2018-02-27 PROCEDURE — G8484 FLU IMMUNIZE NO ADMIN: HCPCS | Performed by: NURSE PRACTITIONER

## 2018-02-27 PROCEDURE — 1036F TOBACCO NON-USER: CPT | Performed by: NURSE PRACTITIONER

## 2018-02-27 ASSESSMENT — ENCOUNTER SYMPTOMS
SHORTNESS OF BREATH: 0
RHINORRHEA: 0
TROUBLE SWALLOWING: 0
ABDOMINAL PAIN: 0
CHEST TIGHTNESS: 0
EYE PAIN: 0
DIARRHEA: 0
NAUSEA: 0
ABDOMINAL DISTENTION: 0
WHEEZING: 0

## 2018-02-27 NOTE — PROGRESS NOTES
BARIATRIC SURGERY POST OPERATIVE NOTE    SUBJECTIVE:    Patient presenting today referred from Christos Blackman MD, for   Chief Complaint   Patient presents with   [facility] Post Op Follow Up     PO#6 OR 9/5/17 Sleeve   . HPI: Duke Angel is a 41 y.o. female presenting for post op visit. /72 (Site: Right Arm, Position: Sitting, Cuff Size: Large Adult)   Pulse 91   Resp 16   Ht 5' 7\" (1.702 m)   Wt 230 lb (104.3 kg)   BMI 36.02 kg/m²      BMI: Body mass index is 36.02 kg/m². Obesity Classification: Class II    Weight History: Wt Readings from Last 3 Encounters:   02/27/18 230 lb (104.3 kg)   01/17/18 231 lb 9.6 oz (105.1 kg)   12/18/17 245 lb (111.1 kg)     Date of Surgery: 9/5/17    Type of Surgery:   Laparoscopic robotic DaVinci-assisted sleeve gastrectomy around a  38-[place] bougie + Hiatal hernia primary repair. BMI:36.02 Obesity Classification: Class II  Today's weight is 230.0 lbs, last visits weight was   Wt Readings from Last 3 Encounters:   02/27/18 230 lb (104.3 kg)   01/17/18 231 lb 9.6 oz (105.1 kg)   12/18/17 245 lb (111.1 kg)   , total gain/loss is -1.6 lbs    Weight History: Wt Readings from Last 3 Encounters:   02/27/18 230 lb (104.3 kg)   01/17/18 231 lb 9.6 oz (105.1 kg)   12/18/17 245 lb (111.1 kg)       Total weight loss/gain -36.3 Lbs over 6 month. How pleased are you with your current weight loss: stuck and frustrated  If you are disappointed, what do you think is preventing you from increased weight loss? Is patient experiencing any physical problems post surgery: No:   Is patient experiencing any dietary problems post surgery: No:   Food Intolerances: Denies any food intolerances since last seen. How hungry are you? Very hungry  How full do you feel after eating? Pretty full  How fast do you eat?  60 min  Food log brought to appointment today: No    Breakfast: yes  Mid-AM Snack: yes  Lunch: yes  Mid-PM Snack: yes  Dinner: yes  Evening Snack: COLON, DIAGNOSTIC  2017    HERNIA REPAIR      SLEEVE GASTRECTOMY  09/05/2017    Robotic sleeve gastrectomy and hiatal hernia repair    TUBAL LIGATION  2005     Social History     Social History    Marital status: Single     Spouse name: N/A    Number of children: N/A    Years of education: N/A     Social History Main Topics    Smoking status: Former Smoker     Packs/day: 0.50     Years: 13.00     Types: Cigarettes     Start date: 1995     Quit date: 2008    Smokeless tobacco: Never Used    Alcohol use Yes      Comment: \"Occ. Maybe Once A Year\"    Drug use: No    Sexual activity: Not Currently     Other Topics Concern    None     Social History Narrative    None     Review of Systems   Constitutional: Negative. Negative for appetite change, fatigue and fever. HENT: Negative for congestion, dental problem, hearing loss, rhinorrhea and trouble swallowing. Eyes: Negative for pain. Respiratory: Negative for chest tightness, shortness of breath and wheezing. Cardiovascular: Negative for chest pain, palpitations and leg swelling. Gastrointestinal: Negative for abdominal distention, abdominal pain, diarrhea and nausea. Endocrine: Negative for cold intolerance and polydipsia. Genitourinary: Negative for difficulty urinating and frequency. Musculoskeletal: Negative for arthralgias and gait problem. Skin: Negative for rash. Allergic/Immunologic: Negative for environmental allergies. Neurological: Negative for dizziness, seizures and syncope. Hematological: Does not bruise/bleed easily. Psychiatric/Behavioral: Negative for behavioral problems and suicidal ideas. Physical Exam   Constitutional: She is oriented to person, place, and time. She appears well-developed and well-nourished. Obese   HENT:   Head: Normocephalic and atraumatic.    Right Ear: Hearing and ear canal normal.   Left Ear: Hearing and ear canal normal.   Nose: Nose normal.   Mouth/Throat: Uvula is midline and

## 2018-03-01 ENCOUNTER — HOSPITAL ENCOUNTER (OUTPATIENT)
Dept: OTHER | Age: 38
Discharge: HOME | End: 2018-03-31
Attending: NURSE PRACTITIONER | Admitting: NURSE PRACTITIONER

## 2018-03-01 ENCOUNTER — HOSPITAL ENCOUNTER (OUTPATIENT)
Dept: PHYSICAL THERAPY | Age: 38
Discharge: HOME | End: 2018-03-01

## 2018-03-01 NOTE — FLOWSHEET NOTE
Physical Therapy Daily Treatment Note  Date:  3/1/2018    Patient Name:  Darion Duong    :  1980  MRN: [id number]  Restrictions/Precautions:  40 lb lifting        Insurance/Certification information:  Workers comp ends 18  10 visits  Next Physician Visit:  Referring Physician:  Allyson Mcclellan CNP  [facility]  POC EXPIRATION DATE  Visit# / total visits:  2/      10  C9 ends 18            Initial Pain level: 0/10    Subjective:  Pt states the worst pain 5/10 with working. She states SI belt helps greatly and she uses it at work   Case manage states we could continue PT and send notes and she will extend end date for C9    Exam: range of motion, strength, posture, SLR, DTRs, SI tests  Clinical Presentation: evolving  Pt presents with 0 to 6/10 right SI joint pain that limits her from lifitng , walking, standing.   today she started with Serola SI belt to stabilize SI joint, and strengthening exs.    Patient Education: written    Any changes to Ambulatory Summary Sheet? no             Goals:     Long term goals  Time Frame for Long term goals : 60 days  Long term goal 1: indep with home program  Long term goal 2: 0 to 2/10 pain  Long term goal 3: able to improve functional score by 5 points on Oswestry  Patient's goal: no pain  Skilled PT activities: Date  18  Date 3/1/18  Date Date     Outcome measure used          On visit#\oswetry 10/50         serola SI belt  Purpose, wearing schedule, explained, pt tried it for 20 min and walking,  Wearing at work only because she states it is painful       Lat pull backs, Rows  30 lb 10x  30 lb 10x   Pt goes to [employer] 30 lb 2 x 10  30 lb 2 x 10       Wall slide 2x 20 sec 3 x 20 sec       Lat pull downs sitting facing out  6.5 p 2 x 10     Lat pull backs standing with elbows ext  4p 2 x 10       quadraped arm raises only, not legs   quadraped arm raises only, not legs 2 x 10      pelvic tilts supine   10 ct x 10     bridging    5 ct 2 x 10

## 2018-03-01 NOTE — FLOWSHEET NOTE
Spoke with  Ivana Stone on 3/1/2018 @ 8:40 am in regards to patient's visits and end date.  said that patient ends on 2/22/2018 but still wants her to be seen today 3/1/2018.   Wants us to schedule the remaining 7 visits and call  with end date and she will fax over the new c-9 approval. Carolyn's phone # [phone] and fax # is [phone]

## 2018-05-03 DIAGNOSIS — E66.01 MORBID OBESITY WITH BMI OF 40.0-44.9, ADULT (HCC): ICD-10-CM

## 2018-05-03 DIAGNOSIS — R63.4 WEIGHT LOSS: ICD-10-CM

## 2018-05-03 DIAGNOSIS — Z98.84 STATUS POST LAPAROSCOPIC SLEEVE GASTRECTOMY: Primary | ICD-10-CM

## 2018-05-04 ENCOUNTER — HOSPITAL ENCOUNTER (OUTPATIENT)
Dept: LAB | Age: 38
Discharge: HOME | End: 2018-05-04
Attending: NURSE PRACTITIONER | Admitting: NURSE PRACTITIONER

## 2018-05-04 LAB
FOLATE: 18.6 NG/ML (ref 3.1–17.5)
VITAMIN B-12: 880.2 PG/ML (ref 211–911)

## 2018-10-21 ENCOUNTER — HOSPITAL ENCOUNTER (EMERGENCY)
Age: 38
Discharge: HOME | End: 2018-10-21
Payer: COMMERCIAL

## 2018-10-21 ENCOUNTER — APPOINTMENT (OUTPATIENT)
Dept: GENERAL RADIOLOGY | Age: 38
End: 2018-10-21
Payer: COMMERCIAL

## 2018-10-21 VITALS
OXYGEN SATURATION: 99 % | RESPIRATION RATE: 16 BRPM | TEMPERATURE: 97.6 F | HEIGHT: 67 IN | HEART RATE: 82 BPM | DIASTOLIC BLOOD PRESSURE: 83 MMHG | BODY MASS INDEX: 36.1 KG/M2 | WEIGHT: 230 LBS | SYSTOLIC BLOOD PRESSURE: 123 MMHG

## 2018-10-21 DIAGNOSIS — M25.561 RIGHT KNEE PAIN, UNSPECIFIED CHRONICITY: Primary | ICD-10-CM

## 2018-10-21 PROCEDURE — 73560 X-RAY EXAM OF KNEE 1 OR 2: CPT

## 2018-10-21 PROCEDURE — 99283 EMERGENCY DEPT VISIT LOW MDM: CPT

## 2018-10-21 RX ORDER — IBUPROFEN 800 MG/1
800 TABLET ORAL EVERY 6 HOURS PRN
Qty: 30 TABLET | Refills: 0 | Status: SHIPPED | OUTPATIENT
Start: 2018-10-21 | End: 2021-09-28

## 2018-10-21 ASSESSMENT — PAIN DESCRIPTION - ORIENTATION: ORIENTATION: RIGHT

## 2018-10-21 ASSESSMENT — PAIN DESCRIPTION - LOCATION: LOCATION: KNEE

## 2018-10-21 ASSESSMENT — PAIN SCALES - GENERAL: PAINLEVEL_OUTOF10: 9

## 2018-10-21 ASSESSMENT — PAIN DESCRIPTION - PAIN TYPE: TYPE: ACUTE PAIN

## 2018-10-21 NOTE — ED PROVIDER NOTES
eMERGENCY dEPARTMENT eNCOUnter        [facility]    Chief Complaint   Patient presents with    Knee Pain       HPI    Jaylin Majano is a 41 y.o. female who presents with right knee pain. Onset 3 weeks ago, worse just today. Constant. Aching, throbbing. Worse with weightbearing and full extension. Severity 9/10. Denies any swelling, redness, warmth. Denies any injury. REVIEW OF SYSTEMS    Musculoskeletal:  + right knee pain. Integument:  Denies erythema, denies abrasions/lacerations. Neurologic:  Denies numbness or tingling.       PAST MEDICAL & SURGICAL HISTORY    Past Medical History:   Diagnosis Date    Anemia     Anxiety     Arthritis     \"Left Hip\"    Diabetes ([address] Dx 2008    Hiatal hernia     Hyperlipidemia     \"They Put Me On Lipitor Because I'm Diabetic, I Have Never Had High Cholesterol\"    Sleep apnea     Uses CPAP    Teeth missing     Upper    Wears glasses     Wears partial dentures     Upper     Past Surgical History:   Procedure Laterality Date    BREAST BIOPSY Bilateral 2006    Benign    DENTAL SURGERY      Teeth Extracted In The Past    ENDOSCOPY, COLON, DIAGNOSTIC  2017    HERNIA REPAIR      SLEEVE GASTRECTOMY  09/05/2017    Robotic sleeve gastrectomy and hiatal hernia repair    TUBAL LIGATION  2005       CURRENT MEDICATIONS    Current Outpatient Rx   Medication Sig Dispense Refill    ibuprofen (ADVIL;MOTRIN) 800 MG tablet Take 1 tablet by mouth every 6 hours as needed for Pain 30 tablet 0    naproxen (NAPROSYN) 500 MG tablet Take 1 tablet by mouth 2 times daily as needed for Pain 60 tablet 0    calcium 600 MG TABS tablet Take 1 tablet by mouth 2 times daily as needed      zinc 50 MG CAPS Take 1 capsule by mouth daily      Ferrous Sulfate (IRON) 142 (45 Fe) MG TBCR Take by mouth      vitamin B-12 (CYANOCOBALAMIN) 1000 MCG tablet Take 1,000 mcg by mouth daily      Multiple Vitamins-Minerals (HM MULTIVITAMIN ADULT GUMMY PO) Take 2 Doses by mouth     

## 2018-12-12 ENCOUNTER — HOSPITAL ENCOUNTER (EMERGENCY)
Age: 38
Discharge: HOME | End: 2018-12-12
Attending: EMERGENCY MEDICINE
Payer: COMMERCIAL

## 2018-12-12 ENCOUNTER — APPOINTMENT (OUTPATIENT)
Dept: GENERAL RADIOLOGY | Age: 38
End: 2018-12-12
Payer: COMMERCIAL

## 2018-12-12 VITALS
SYSTOLIC BLOOD PRESSURE: 122 MMHG | RESPIRATION RATE: 16 BRPM | HEIGHT: 67 IN | TEMPERATURE: 98.2 F | WEIGHT: 240 LBS | DIASTOLIC BLOOD PRESSURE: 77 MMHG | BODY MASS INDEX: 37.67 KG/M2 | HEART RATE: 92 BPM | OXYGEN SATURATION: 99 %

## 2018-12-12 DIAGNOSIS — M79.672 LEFT FOOT PAIN: Primary | ICD-10-CM

## 2018-12-12 PROCEDURE — 96372 THER/PROPH/DIAG INJ SC/IM: CPT

## 2018-12-12 PROCEDURE — 99283 EMERGENCY DEPT VISIT LOW MDM: CPT

## 2018-12-12 PROCEDURE — 73620 X-RAY EXAM OF FOOT: CPT

## 2018-12-12 PROCEDURE — 6360000002 HC RX W HCPCS: Performed by: EMERGENCY MEDICINE

## 2018-12-12 RX ORDER — NAPROXEN 500 MG/1
500 TABLET ORAL 2 TIMES DAILY
Qty: 60 TABLET | Refills: 0 | Status: SHIPPED | OUTPATIENT
Start: 2018-12-12 | End: 2021-09-28

## 2018-12-12 RX ORDER — KETOROLAC TROMETHAMINE 30 MG/ML
30 INJECTION, SOLUTION INTRAMUSCULAR; INTRAVENOUS ONCE
Status: COMPLETED | OUTPATIENT
Start: 2018-12-12 | End: 2018-12-12

## 2018-12-12 RX ADMIN — KETOROLAC TROMETHAMINE 30 MG: 30 INJECTION, SOLUTION INTRAMUSCULAR at 22:29

## 2018-12-12 ASSESSMENT — PAIN DESCRIPTION - ORIENTATION: ORIENTATION: LEFT

## 2018-12-12 ASSESSMENT — PAIN DESCRIPTION - LOCATION: LOCATION: FOOT

## 2018-12-12 ASSESSMENT — PAIN SCALES - GENERAL
PAINLEVEL_OUTOF10: 8
PAINLEVEL_OUTOF10: 8

## 2018-12-12 NOTE — LETTER
[address] [place] [address] Emergency Department  [place] [id number]  Phone: [phone]  Fax: [phone]               December 12, 2018    Patient: Sari Miner   YOB: 1980   Date of Visit: 12/12/2018       To Whom It May Concern:    Sari Miner was seen and treated in our emergency department on 12/12/2018. She may return to work on 12/15/18.     Sincerely,       [facility]        Signature:__________________________________

## 2018-12-13 NOTE — ED NOTES
Bed: ED-18  Expected date:   Expected time:   Means of arrival:   Comments:  From [address] Danielle Gomez RN  12/12/18 [id number]

## 2018-12-13 NOTE — ED TRIAGE NOTES
Patient stated that she fell missed a step and came down on her left foot wrong at 4PM today. Patient stated that she was able to walk and come to work. Patient stated that the pain was was to much to keep walking. Patient foot does not appear swollen and patient is able to move freely.

## 2019-01-23 ENCOUNTER — HOSPITAL ENCOUNTER (EMERGENCY)
Age: 39
Discharge: HOME | End: 2019-01-23
Attending: EMERGENCY MEDICINE
Payer: COMMERCIAL

## 2019-01-23 ENCOUNTER — APPOINTMENT (OUTPATIENT)
Dept: GENERAL RADIOLOGY | Age: 39
End: 2019-01-23
Payer: COMMERCIAL

## 2019-01-23 VITALS
SYSTOLIC BLOOD PRESSURE: 111 MMHG | WEIGHT: 240 LBS | BODY MASS INDEX: 37.67 KG/M2 | OXYGEN SATURATION: 97 % | TEMPERATURE: 97.5 F | HEIGHT: 67 IN | DIASTOLIC BLOOD PRESSURE: 80 MMHG | HEART RATE: 109 BPM | RESPIRATION RATE: 26 BRPM

## 2019-01-23 DIAGNOSIS — R07.9 CHEST PAIN, UNSPECIFIED TYPE: Primary | ICD-10-CM

## 2019-01-23 LAB
ALBUMIN SERPL-MCNC: 4 GM/DL (ref 3.4–5)
ALP BLD-CCNC: 60 IU/L (ref 40–129)
ALT SERPL-CCNC: 12 U/L (ref 10–40)
ANION GAP SERPL CALCULATED.3IONS-SCNC: 9 MMOL/L (ref 4–16)
AST SERPL-CCNC: 12 IU/L (ref 15–37)
BASOPHILS ABSOLUTE: 0 K/CU MM
BASOPHILS RELATIVE PERCENT: 0.6 % (ref 0–1)
BILIRUB SERPL-MCNC: 0.5 MG/DL (ref 0–1)
BUN BLDV-MCNC: 13 MG/DL (ref 6–23)
CALCIUM SERPL-MCNC: 8.8 MG/DL (ref 8.3–10.6)
CHLORIDE BLD-SCNC: 106 MMOL/L (ref 99–110)
CO2: 27 MMOL/L (ref 21–32)
CREAT SERPL-MCNC: 0.7 MG/DL (ref 0.6–1.1)
DIFFERENTIAL TYPE: ABNORMAL
EOSINOPHILS ABSOLUTE: 0.2 K/CU MM
EOSINOPHILS RELATIVE PERCENT: 2.4 % (ref 0–3)
GFR AFRICAN AMERICAN: >60 ML/MIN/1.73M2
GFR NON-AFRICAN AMERICAN: >60 ML/MIN/1.73M2
GLUCOSE BLD-MCNC: 82 MG/DL (ref 70–99)
HCT VFR BLD CALC: 35.3 % (ref 37–47)
HEMOGLOBIN: 10.4 GM/DL (ref 12.5–16)
IMMATURE NEUTROPHIL %: 0.3 % (ref 0–0.43)
LYMPHOCYTES ABSOLUTE: 2.3 K/CU MM
LYMPHOCYTES RELATIVE PERCENT: 36.8 % (ref 24–44)
MCH RBC QN AUTO: 23.2 PG (ref 27–31)
MCHC RBC AUTO-ENTMCNC: 29.5 % (ref 32–36)
MCV RBC AUTO: 78.6 FL (ref 78–100)
MONOCYTES ABSOLUTE: 0.4 K/CU MM
MONOCYTES RELATIVE PERCENT: 6.9 % (ref 0–4)
NUCLEATED RBC %: 0 %
PDW BLD-RTO: 24.4 % (ref 11.7–14.9)
PLATELET # BLD: 330 K/CU MM (ref 140–440)
PMV BLD AUTO: 10.6 FL (ref 7.5–11.1)
POTASSIUM SERPL-SCNC: 4.2 MMOL/L (ref 3.5–5.1)
RBC # BLD: 4.49 M/CU MM (ref 4.2–5.4)
SEGMENTED NEUTROPHILS ABSOLUTE COUNT: 3.3 K/CU MM
SEGMENTED NEUTROPHILS RELATIVE PERCENT: 53 % (ref 36–66)
SODIUM BLD-SCNC: 142 MMOL/L (ref 135–145)
TOTAL IMMATURE NEUTOROPHIL: 0.02 K/CU MM
TOTAL NUCLEATED RBC: 0 K/CU MM
TOTAL PROTEIN: 7.4 GM/DL (ref 6.4–8.2)
TROPONIN T: <0.01 NG/ML
TROPONIN T: <0.01 NG/ML
WBC # BLD: 6.3 K/CU MM (ref 4–10.5)

## 2019-01-23 PROCEDURE — 99285 EMERGENCY DEPT VISIT HI MDM: CPT

## 2019-01-23 PROCEDURE — 80053 COMPREHEN METABOLIC PANEL: CPT

## 2019-01-23 PROCEDURE — 71046 X-RAY EXAM CHEST 2 VIEWS: CPT

## 2019-01-23 PROCEDURE — 93005 ELECTROCARDIOGRAM TRACING: CPT | Performed by: EMERGENCY MEDICINE

## 2019-01-23 PROCEDURE — 84484 ASSAY OF TROPONIN QUANT: CPT

## 2019-01-23 PROCEDURE — 85025 COMPLETE CBC W/AUTO DIFF WBC: CPT

## 2019-01-23 PROCEDURE — 36415 COLL VENOUS BLD VENIPUNCTURE: CPT

## 2019-01-23 ASSESSMENT — PAIN SCALES - GENERAL: PAINLEVEL_OUTOF10: 8

## 2019-01-23 ASSESSMENT — PAIN DESCRIPTION - LOCATION: LOCATION: CHEST

## 2019-01-23 ASSESSMENT — PAIN DESCRIPTION - PAIN TYPE: TYPE: ACUTE PAIN

## 2019-01-24 LAB
EKG ATRIAL RATE: 86 BPM
EKG ATRIAL RATE: 86 BPM
EKG DIAGNOSIS: NORMAL
EKG DIAGNOSIS: NORMAL
EKG P AXIS: 48 DEGREES
EKG P AXIS: 54 DEGREES
EKG P-R INTERVAL: 188 MS
EKG P-R INTERVAL: 188 MS
EKG Q-T INTERVAL: 370 MS
EKG Q-T INTERVAL: 380 MS
EKG QRS DURATION: 90 MS
EKG QRS DURATION: 92 MS
EKG QTC CALCULATION (BAZETT): 442 MS
EKG QTC CALCULATION (BAZETT): 454 MS
EKG R AXIS: 20 DEGREES
EKG R AXIS: 30 DEGREES
EKG T AXIS: 20 DEGREES
EKG T AXIS: 26 DEGREES
EKG VENTRICULAR RATE: 86 BPM
EKG VENTRICULAR RATE: 86 BPM

## 2019-01-24 PROCEDURE — 93010 ELECTROCARDIOGRAM REPORT: CPT | Performed by: INTERNAL MEDICINE

## 2019-03-11 ENCOUNTER — HOSPITAL ENCOUNTER (OUTPATIENT)
Age: 39
Setting detail: SPECIMEN
Discharge: HOME | End: 2019-03-11
Payer: COMMERCIAL

## 2019-03-11 LAB
BACTERIA: ABNORMAL /HPF
BILIRUBIN URINE: NEGATIVE MG/DL
BLOOD, URINE: NEGATIVE
CLARITY: CLEAR
COLOR: YELLOW
ERYTHROCYTE SEDIMENTATION RATE: 11 MM/HR (ref 0–20)
FOLATE: 14.8 NG/ML (ref 3.1–17.5)
GLUCOSE, URINE: NEGATIVE MG/DL
KETONES, URINE: ABNORMAL MG/DL
LACTATE DEHYDROGENASE: 161 IU/L (ref 120–246)
LEUKOCYTE ESTERASE, URINE: NEGATIVE
MUCUS: ABNORMAL HPF
NITRITE URINE, QUANTITATIVE: NEGATIVE
PH, URINE: 5 (ref 5–8)
PROTEIN UA: NEGATIVE MG/DL
RBC URINE: ABNORMAL /HPF (ref 0–6)
RETICULOCYTE COUNT PCT: 1.3 % (ref 0.2–2.2)
SPECIFIC GRAVITY UA: 1.03 (ref 1–1.03)
SQUAMOUS EPITHELIAL: 3 /HPF
TRICHOMONAS: ABNORMAL /HPF
TSH HIGH SENSITIVITY: 1.53 UIU/ML (ref 0.27–4.2)
UROBILINOGEN, URINE: 2 MG/DL (ref 0.2–1)
VITAMIN B-12: 817.4 PG/ML (ref 211–911)
WBC UA: ABNORMAL /HPF (ref 0–5)

## 2019-03-11 PROCEDURE — 86320 SERUM IMMUNOELECTROPHORESIS: CPT

## 2019-03-11 PROCEDURE — 87086 URINE CULTURE/COLONY COUNT: CPT

## 2019-03-11 PROCEDURE — 84165 PROTEIN E-PHORESIS SERUM: CPT

## 2019-03-11 PROCEDURE — 83615 LACTATE (LD) (LDH) ENZYME: CPT

## 2019-03-11 PROCEDURE — 82607 VITAMIN B-12: CPT

## 2019-03-11 PROCEDURE — 85652 RBC SED RATE AUTOMATED: CPT

## 2019-03-11 PROCEDURE — 82652 VIT D 1 25-DIHYDROXY: CPT

## 2019-03-11 PROCEDURE — 81001 URINALYSIS AUTO W/SCOPE: CPT

## 2019-03-11 PROCEDURE — 84443 ASSAY THYROID STIM HORMONE: CPT

## 2019-03-11 PROCEDURE — 82746 ASSAY OF FOLIC ACID SERUM: CPT

## 2019-03-11 PROCEDURE — 83010 ASSAY OF HAPTOGLOBIN QUANT: CPT

## 2019-03-11 PROCEDURE — 85045 AUTOMATED RETICULOCYTE COUNT: CPT

## 2019-03-12 LAB
ALBUMIN ELP: 3.9 GM/DL (ref 3.2–5.6)
ALPHA-1-GLOBULIN: 0.3 GM/DL (ref 0.1–0.4)
ALPHA-2-GLOBULIN: 0.6 GM/DL (ref 0.4–1.2)
BETA GLOBULIN: 1.2 GM/DL (ref 0.5–1.3)
GAMMA GLOBULIN: 1.5 GM/DL (ref 0.5–1.6)
SPEP INTERPRETATION: NORMAL
SPEP INTERPRETATION: NORMAL
TOTAL PROTEIN: 7.5 GM/DL (ref 6.4–8.2)

## 2019-03-13 LAB
CULTURE: NORMAL
HAPTOGLOBIN: 76 MG/DL (ref 30–200)
HAPTOGLOBIN: NORMAL MG/DL (ref 30–200)
Lab: NORMAL
SPECIMEN: NORMAL
VITAMIN D 1,25-DIHYDROXY: 73.7 PG/ML (ref 19.9–79.3)
VITAMIN D 1,25-DIHYDROXY: NORMAL PG/ML (ref 19.9–79.3)

## 2019-03-25 ENCOUNTER — HOSPITAL ENCOUNTER (OUTPATIENT)
Age: 39
Setting detail: SPECIMEN
Discharge: HOME | End: 2019-03-25
Payer: COMMERCIAL

## 2019-03-25 PROCEDURE — 82495 ASSAY OF CHROMIUM: CPT

## 2019-03-25 PROCEDURE — 82525 ASSAY OF COPPER: CPT

## 2019-03-25 PROCEDURE — 83785 ASSAY OF MANGANESE: CPT

## 2019-03-25 PROCEDURE — 84630 ASSAY OF ZINC: CPT

## 2019-03-27 LAB
CHROMIUM, SERUM: 2.1 UG/L
CHROMIUM, SERUM: NORMAL UG/L
COPPER: 112 UG/DL (ref 80–155)
COPPER: NORMAL UG/DL (ref 80–155)

## 2019-03-30 LAB
ZINC: 55 UG/DL (ref 60–120)
ZINC: ABNORMAL UG/DL (ref 60–120)

## 2019-03-31 LAB
MANGANESE, BLOOD: 5.1 UG/L (ref 0–2)
MANGANESE, BLOOD: ABNORMAL UG/L (ref 0–2)

## 2019-04-22 ENCOUNTER — OFFICE VISIT (OUTPATIENT)
Dept: FAMILY MEDICINE CLINIC | Age: 39
End: 2019-04-22
Payer: COMMERCIAL

## 2019-04-22 VITALS
OXYGEN SATURATION: 100 % | WEIGHT: 235.6 LBS | BODY MASS INDEX: 36.9 KG/M2 | DIASTOLIC BLOOD PRESSURE: 72 MMHG | SYSTOLIC BLOOD PRESSURE: 112 MMHG | HEART RATE: 86 BPM

## 2019-04-22 DIAGNOSIS — G89.29 CHRONIC PAIN OF BOTH KNEES: ICD-10-CM

## 2019-04-22 DIAGNOSIS — M25.561 CHRONIC PAIN OF BOTH KNEES: ICD-10-CM

## 2019-04-22 DIAGNOSIS — M25.562 CHRONIC PAIN OF BOTH KNEES: ICD-10-CM

## 2019-04-22 DIAGNOSIS — Z98.84 STATUS POST LAPAROSCOPIC SLEEVE GASTRECTOMY: ICD-10-CM

## 2019-04-22 DIAGNOSIS — D50.9 IRON DEFICIENCY ANEMIA, UNSPECIFIED IRON DEFICIENCY ANEMIA TYPE: Primary | ICD-10-CM

## 2019-04-22 PROCEDURE — G8417 CALC BMI ABV UP PARAM F/U: HCPCS | Performed by: FAMILY MEDICINE

## 2019-04-22 PROCEDURE — 1036F TOBACCO NON-USER: CPT | Performed by: FAMILY MEDICINE

## 2019-04-22 PROCEDURE — 99203 OFFICE O/P NEW LOW 30 MIN: CPT | Performed by: FAMILY MEDICINE

## 2019-04-22 PROCEDURE — G8427 DOCREV CUR MEDS BY ELIG CLIN: HCPCS | Performed by: FAMILY MEDICINE

## 2019-04-22 RX ORDER — MULTIVIT-MIN/IRON/FOLIC ACID/K 18-600-40
2000 CAPSULE ORAL DAILY
COMMUNITY
End: 2021-09-28

## 2019-04-22 RX ORDER — MULTIVIT WITH MINERALS/LUTEIN
250 TABLET ORAL 2 TIMES DAILY
COMMUNITY
End: 2021-09-28

## 2019-04-22 ASSESSMENT — ENCOUNTER SYMPTOMS
SHORTNESS OF BREATH: 0
SORE THROAT: 0
CHEST TIGHTNESS: 0
CONSTIPATION: 0
WHEEZING: 0
SINUS PRESSURE: 0
COUGH: 0
DIARRHEA: 0
BACK PAIN: 0
ABDOMINAL PAIN: 0

## 2019-04-22 ASSESSMENT — PATIENT HEALTH QUESTIONNAIRE - PHQ9
SUM OF ALL RESPONSES TO PHQ QUESTIONS 1-9: 0
2. FEELING DOWN, DEPRESSED OR HOPELESS: 0
1. LITTLE INTEREST OR PLEASURE IN DOING THINGS: 0
SUM OF ALL RESPONSES TO PHQ QUESTIONS 1-9: 0
SUM OF ALL RESPONSES TO PHQ9 QUESTIONS 1 & 2: 0

## 2019-04-22 NOTE — PROGRESS NOTES
Cmarynyakelin Ndiaye  20    Chief Complaint   Patient presents with    New Patient           46 yrs old lady with PMH of anemia due to chronic heavy period, gastric bypass, and chronic both knees pain, came today to establish with us, doing fine except has both knee pain.       Past Medical History:   Diagnosis Date    Anemia     Anxiety     Arthritis     \"Left Hip\"    Diabetes ([address] Dx     Hiatal hernia     Hyperlipidemia     \"They Put Me On Lipitor Because I'm Diabetic, I Have Never Had High Cholesterol\"    Sleep apnea     Uses CPAP    Teeth missing     Upper    Wears glasses     Wears partial dentures     Upper     Past Surgical History:   Procedure Laterality Date    BREAST BIOPSY Bilateral     Benign    DENTAL SURGERY      Teeth Extracted In The Past    ENDOSCOPY, COLON, DIAGNOSTIC      HERNIA REPAIR      SLEEVE GASTRECTOMY  2017    Robotic sleeve gastrectomy and hiatal hernia repair    TUBAL LIGATION       Family History   Problem Relation Age of Onset    High Blood Pressure Mother     High Cholesterol Mother     Bipolar Disorder Sister     Mental Illness Sister         Bipolar    Diabetes Brother     High Blood Pressure Sister     Stroke Brother     Asthma Daughter      Social History     Socioeconomic History    Marital status: Single     Spouse name: Not on file    Number of children: Not on file    Years of education: Not on file    Highest education level: Not on file   Occupational History    Not on file   Social Needs    Financial resource strain: Not on file    Food insecurity:     Worry: Not on file     Inability: Not on file    Transportation needs:     Medical: Not on file     Non-medical: Not on file   Tobacco Use    Smoking status: Former Smoker     Packs/day: 0.50     Years: 13.00     Pack years: 6.50     Types: Cigarettes     Start date:      Last attempt to quit:      Years since quittin.3    Smokeless tobacco: Never Used   Substance and Sexual Activity    Alcohol use: Yes     Comment: \"Occ. Maybe Once A Year\"    Drug use: No    Sexual activity: Not Currently   Lifestyle    Physical activity:     Days per week: Not on file     Minutes per session: Not on file    Stress: Not on file   Relationships    Social connections:     Talks on phone: Not on file     Gets together: Not on file     Attends Jewish service: Not on file     Active member of club or organization: Not on file     Attends meetings of clubs or organizations: Not on file     Relationship status: Not on file    Intimate partner violence:     Fear of current or ex partner: Not on file     Emotionally abused: Not on file     Physically abused: Not on file     Forced sexual activity: Not on file   Other Topics Concern    Not on file   Social History Narrative    Not on file       No Known Allergies  Current Outpatient Medications   Medication Sig Dispense Refill    Ascorbic Acid (VITAMIN C) 250 MG tablet Take 250 mg by mouth 2 times daily      Cholecalciferol (VITAMIN D) 2000 units CAPS capsule Take 2,000 Units by mouth daily      naproxen (NAPROSYN) 500 MG tablet Take 1 tablet by mouth 2 times daily 60 tablet 0    ibuprofen (ADVIL;MOTRIN) 800 MG tablet Take 1 tablet by mouth every 6 hours as needed for Pain 30 tablet 0    calcium 600 MG TABS tablet Take 1 tablet by mouth 2 times daily as needed      Ferrous Sulfate (IRON) 142 (45 Fe) MG TBCR Take by mouth      vitamin B-12 (CYANOCOBALAMIN) 1000 MCG tablet Take 1,000 mcg by mouth daily      Multiple Vitamins-Minerals (HM MULTIVITAMIN ADULT GUMMY PO) Take 2 Doses by mouth      zinc 50 MG CAPS Take 1 capsule by mouth daily       No current facility-administered medications for this visit. Review of Systems   Constitutional: Negative for activity change, appetite change, chills, fatigue and fever. HENT: Negative for congestion, postnasal drip, sinus pressure and sore throat. Respiratory: Negative for cough, chest tightness, shortness of breath and wheezing. Cardiovascular: Negative for chest pain and leg swelling. Gastrointestinal: Negative for abdominal pain, constipation and diarrhea. Genitourinary: Negative for dysuria and frequency. Musculoskeletal: Positive for arthralgias (both knees pain). Negative for back pain and gait problem. Skin: Negative for rash. Neurological: Negative for dizziness, weakness and headaches. Psychiatric/Behavioral: Negative for agitation and behavioral problems. The patient is not nervous/anxious.         Lab Results   Component Value Date    WBC 6.3 01/23/2019    HGB 10.4 (L) 01/23/2019    HCT 35.3 (L) 01/23/2019    MCV 78.6 01/23/2019     01/23/2019     Lab Results   Component Value Date     01/23/2019    K 4.2 01/23/2019     01/23/2019    CO2 27 01/23/2019    BUN 13 01/23/2019    CREATININE 0.7 01/23/2019    GLUCOSE 82 01/23/2019    CALCIUM 8.8 01/23/2019    PROT 7.4 01/23/2019    LABALBU 4.0 01/23/2019    BILITOT 0.5 01/23/2019    ALKPHOS 60 01/23/2019    AST 12 (L) 01/23/2019    ALT 12 01/23/2019    LABGLOM >60 01/23/2019    GFRAA >60 01/23/2019     Lab Results   Component Value Date    CHOL 156 10/27/2016     Lab Results   Component Value Date    TRIG 168 (H) 10/27/2016     Lab Results   Component Value Date    HDL 46 10/27/2016     No results found for: [facility], LDLCHOLESTEROL  Lab Results   Component Value Date    LABA1C 5.3 02/09/2018     Lab Results   Component Value Date    TSHHS 2.810 10/27/2016         /72 (Site: Left Upper Arm, Position: Sitting, Cuff Size: Medium Adult)   Pulse 86   Wt 235 lb 9.6 oz (106.9 kg)   SpO2 100%   BMI 36.90 kg/m²     BP Readings from Last 3 Encounters:   04/22/19 112/72   01/23/19 111/80   12/12/18 122/77       Wt Readings from Last 3 Encounters:   04/22/19 235 lb 9.6 oz (106.9 kg)   01/23/19 240 lb (108.9 kg)   12/12/18 240 lb (108.9 kg)         Physical Exam Constitutional: She is oriented to person, place, and time. She appears well-developed and well-nourished. No distress. HENT:   Head: Normocephalic and atraumatic. Right Ear: External ear normal.   Left Ear: External ear normal.   Nose: Nose normal.   Mouth/Throat: Oropharynx is clear and moist.   Eyes: Pupils are equal, round, and reactive to light. EOM are normal. No scleral icterus. Neck: Normal range of motion. Neck supple. No JVD present. No thyromegaly present. Cardiovascular: Normal rate, regular rhythm and normal heart sounds. No murmur heard. Pulmonary/Chest: Effort normal and breath sounds normal. She has no wheezes. Abdominal: Soft. Bowel sounds are normal. She exhibits no distension and no mass. There is no tenderness. Musculoskeletal: Normal range of motion. She exhibits no edema. Right knee: She exhibits normal range of motion, no swelling, no effusion, no ecchymosis and no deformity. No tenderness found. Left knee: She exhibits normal range of motion, no swelling, no effusion, no ecchymosis and no deformity. No tenderness found. Neurological: She is alert and oriented to person, place, and time. No cranial nerve deficit. She exhibits normal muscle tone. Coordination normal.   Skin: She is not diaphoretic. Psychiatric: She has a normal mood and affect. Her behavior is normal. Judgment and thought content normal.       ASSESSMENT/ PLAN:    1. Iron deficiency anemia, unspecified iron deficiency anemia type  - on iron, already saw the gyn, ablation recommended patient refuse because still wants children, patient has P7    2. Chronic pain of both knees  - recommend loss wt    3. Status post laparoscopic sleeve gastrectomy  - stable                - Appropriateprescription are addressed. - After visit summery provided. - Questions answered and patient verbalizes understanding.  - Call for any problem, questions, or concerns.         Return in about 6 months (around 10/22/2019), or if symptoms worsen or fail to improve.

## 2019-04-24 ENCOUNTER — OFFICE VISIT (OUTPATIENT)
Dept: BARIATRICS/WEIGHT MGMT | Age: 39
End: 2019-04-24
Payer: COMMERCIAL

## 2019-04-24 VITALS
SYSTOLIC BLOOD PRESSURE: 128 MMHG | HEIGHT: 67 IN | RESPIRATION RATE: 16 BRPM | BODY MASS INDEX: 37.29 KG/M2 | DIASTOLIC BLOOD PRESSURE: 64 MMHG | HEART RATE: 90 BPM | WEIGHT: 237.6 LBS

## 2019-04-24 DIAGNOSIS — Z98.84 STATUS POST BARIATRIC SURGERY: Primary | ICD-10-CM

## 2019-04-24 DIAGNOSIS — Z98.84 STATUS POST LAPAROSCOPIC SLEEVE GASTRECTOMY: ICD-10-CM

## 2019-04-24 PROCEDURE — G8417 CALC BMI ABV UP PARAM F/U: HCPCS | Performed by: SURGERY

## 2019-04-24 PROCEDURE — G8427 DOCREV CUR MEDS BY ELIG CLIN: HCPCS | Performed by: SURGERY

## 2019-04-24 PROCEDURE — 99213 OFFICE O/P EST LOW 20 MIN: CPT | Performed by: SURGERY

## 2019-04-24 PROCEDURE — 1036F TOBACCO NON-USER: CPT | Performed by: SURGERY

## 2019-04-24 ASSESSMENT — ENCOUNTER SYMPTOMS
ABDOMINAL PAIN: 0
VOICE CHANGE: 0
BLOOD IN STOOL: 0
SHORTNESS OF BREATH: 0
CONSTIPATION: 0
NAUSEA: 0
SORE THROAT: 0
COLOR CHANGE: 0
WHEEZING: 0
VOMITING: 0
ANAL BLEEDING: 0
DIARRHEA: 0
TROUBLE SWALLOWING: 0
COUGH: 0
PHOTOPHOBIA: 0

## 2019-04-24 NOTE — PROGRESS NOTES
BARIATRIC SURGERY POST OPERATIVE NOTE    SUBJECTIVE:    Patient presenting today referred from Paige Cameron MD, for   Chief Complaint   Patient presents with   [facility] Post Op Follow Up     OR 9/5/17 Sleeve/38fr/HHR     /64 (Site: Right Upper Arm, Position: Sitting, Cuff Size: Medium Adult)   Pulse 90   Resp 16   Ht 5' 7\" (1.702 m)   Wt 237 lb 9.6 oz (107.8 kg)   LMP 04/20/2019   BMI 37.21 kg/m²      HPI: Kofi Harris is a 46 y.o. female Patient is here for their seventh, follow up 20 months post op 9/5/17.     Date of Surgery: 9/5/17     Type of Surgery:   Laparoscopic robotic DaVinci-assisted sleeve gastrectomy around a  38-Khmer bougie + Hiatal hernia primary repair.        BMI: 37.21 Obesity Classification: Class II  Today's weight is 237.6 lbs, last visits weight was       Wt Readings from Last 3 Encounters:   04/24/19 237 lb 9.6 oz (107.8 kg)   04/22/19 235 lb 9.6 oz (106.9 kg)   01/23/19 240 lb (108.9 kg)      Total gain/loss is +7.6lbs     Weight History: Wt Readings from Last 3 Encounters:   04/24/19 237 lb 9.6 oz (107.8 kg)   04/22/19 235 lb 9.6 oz (106.9 kg)   01/23/19 240 lb (108.9 kg)         Total weight loss/gain -28.7 Lbs over 20 month.             How pleased are you with your current weight loss: pleased but at a stand still  If you are disappointed, what do you think is preventing you from increased weight loss? Is patient experiencing any physical problems post surgery: No:       Is patient experiencing any dietary problems post surgery: No:   Food Intolerances: Denies any food intolerances since last seen. How hungry are you? hungry  How full do you feel after eating? Sometimes too full but mostly fine  How fast do you eat? 30 min  Food log brought to appointment today: No     Breakfast: yes  Mid-AM Snack: yes  Lunch: yes  Mid-PM Snack: yes  Dinner: yes  Evening Snack: no     Liquids Consumed: 36-42 oz per day.   Times of day liquids consumed: all Genitourinary: Negative for dysuria, frequency and hematuria. Musculoskeletal: Negative for joint swelling, myalgias and neck stiffness. Skin: Negative for color change and rash. Neurological: Negative for seizures, speech difficulty, light-headedness and numbness. Hematological: Negative for adenopathy. Does not bruise/bleed easily. OBJECTIVE:    Physical Exam   Constitutional: She is oriented to person, place, and time. She appears well-developed and well-nourished. No distress. HENT:   Head: Normocephalic and atraumatic. Eyes: Pupils are equal, round, and reactive to light. Conjunctivae and EOM are normal. No scleral icterus. Neck: Normal range of motion. No JVD present. No tracheal deviation present. No thyromegaly present. Cardiovascular: Normal rate, regular rhythm, normal heart sounds and intact distal pulses. Exam reveals no gallop and no friction rub. No murmur heard. Pulmonary/Chest: Effort normal. No stridor. No respiratory distress. She has no wheezes. She has no rales. She exhibits no tenderness. Abdominal: Soft. Bowel sounds are normal. She exhibits no distension and no mass. There is no tenderness. There is no rebound and no guarding. Musculoskeletal: Normal range of motion. She exhibits no edema or tenderness. Lymphadenopathy:     She has no cervical adenopathy. Neurological: She is alert and oriented to person, place, and time. No cranial nerve deficit. Coordination normal.   Skin: Skin is warm and dry. No rash noted. She is not diaphoretic. No erythema. No pallor. Psychiatric: Her behavior is normal. Judgment and thought content normal.   Vitals reviewed. Assessment / Plan:    1. Status post bariatric surgery    2. Status post laparoscopic sleeve gastrectomy          Needs to count Kcal, and limit to 800 /d. Marija uDarte Needs to see RD  Needs to have her Tubal ligation reversed. . Counseled against...     Follow Up:  Return in about 2 months (around 6/24/2019) for Bariatric follow up: diet, exercise & weight loss, Follow up Symptoms.     Chandrika Mack MD, FACS, FICS  Member of the [employer] of Met[employer] and [employer]ric Sur[employer]    [phone]    4/24/19

## 2019-04-24 NOTE — PROGRESS NOTES
Patient is here for their seventh, follow up 20 months post op 9/5/17. Date of Surgery: 9/5/17    Type of Surgery:   Laparoscopic robotic DaVinci-assisted sleeve gastrectomy around a  38-Turkmen bougie + Hiatal hernia primary repair.       BMI: 37.21 Obesity Classification: Class II  Today's weight is 237.6 lbs, last visits weight was   Wt Readings from Last 3 Encounters:   04/24/19 237 lb 9.6 oz (107.8 kg)   04/22/19 235 lb 9.6 oz (106.9 kg)   01/23/19 240 lb (108.9 kg)     Total gain/loss is +7.6lbs    Weight History: Wt Readings from Last 3 Encounters:   04/24/19 237 lb 9.6 oz (107.8 kg)   04/22/19 235 lb 9.6 oz (106.9 kg)   01/23/19 240 lb (108.9 kg)       Total weight loss/gain -28.7 Lbs over 20 month. How pleased are you with your current weight loss: pleased but at a stand still  If you are disappointed, what do you think is preventing you from increased weight loss? Is patient experiencing any physical problems post surgery: No:    Is patient experiencing any dietary problems post surgery: No:   Food Intolerances: Denies any food intolerances since last seen. How hungry are you? hungry  How full do you feel after eating? Sometimes too full but mostly fine  How fast do you eat? 30 min  Food log brought to appointment today: No    Breakfast: yes  Mid-AM Snack: yes  Lunch: yes  Mid-PM Snack: yes  Dinner: yes  Evening Snack: no    Liquids Consumed: 36-42 oz per day.   Times of day liquids consumed: all day  Patient taking protein supplement: No.  Brand of Supplement: NA sometimes ENSURE  Patient taking multivitamins: Yes  Does patient exercise: daily  What prevents you from exercising: nothing

## 2019-05-06 ENCOUNTER — HOSPITAL ENCOUNTER (OUTPATIENT)
Age: 39
Setting detail: SPECIMEN
Discharge: HOME | End: 2019-05-06
Payer: COMMERCIAL

## 2019-05-06 LAB
ALBUMIN SERPL-MCNC: 4.4 GM/DL (ref 3.4–5)
ALP BLD-CCNC: 57 IU/L (ref 40–129)
ALT SERPL-CCNC: 10 U/L (ref 10–40)
ANION GAP SERPL CALCULATED.3IONS-SCNC: 9 MMOL/L (ref 4–16)
AST SERPL-CCNC: 11 IU/L (ref 15–37)
BILIRUB SERPL-MCNC: 0.3 MG/DL (ref 0–1)
BUN BLDV-MCNC: 9 MG/DL (ref 6–23)
CALCIUM SERPL-MCNC: 9.6 MG/DL (ref 8.3–10.6)
CHLORIDE BLD-SCNC: 102 MMOL/L (ref 99–110)
CO2: 29 MMOL/L (ref 21–32)
CREAT SERPL-MCNC: 0.7 MG/DL (ref 0.6–1.1)
FERRITIN: 36 NG/ML (ref 15–150)
GFR AFRICAN AMERICAN: >60 ML/MIN/1.73M2
GFR NON-AFRICAN AMERICAN: >60 ML/MIN/1.73M2
GLUCOSE BLD-MCNC: 105 MG/DL (ref 70–99)
IRON: 39 UG/DL (ref 37–145)
PCT TRANSFERRIN: 15 % (ref 10–44)
POTASSIUM SERPL-SCNC: 4.6 MMOL/L (ref 3.5–5.1)
SODIUM BLD-SCNC: 140 MMOL/L (ref 135–145)
TOTAL IRON BINDING CAPACITY: 260 UG/DL (ref 250–450)
TOTAL PROTEIN: 7.1 GM/DL (ref 6.4–8.2)
UNSATURATED IRON BINDING CAPACITY: 221 UG/DL (ref 110–370)

## 2019-05-06 PROCEDURE — 82728 ASSAY OF FERRITIN: CPT

## 2019-05-06 PROCEDURE — 83540 ASSAY OF IRON: CPT

## 2019-05-06 PROCEDURE — 80053 COMPREHEN METABOLIC PANEL: CPT

## 2019-05-06 PROCEDURE — 83550 IRON BINDING TEST: CPT

## 2019-06-28 ENCOUNTER — HOSPITAL ENCOUNTER (OUTPATIENT)
Age: 39
Setting detail: SPECIMEN
Discharge: HOME | End: 2019-06-28
Payer: COMMERCIAL

## 2019-06-28 PROCEDURE — 87077 CULTURE AEROBIC IDENTIFY: CPT

## 2019-06-28 PROCEDURE — 87086 URINE CULTURE/COLONY COUNT: CPT

## 2019-06-28 PROCEDURE — 87186 SC STD MICRODIL/AGAR DIL: CPT

## 2019-07-01 LAB
CULTURE: ABNORMAL
Lab: ABNORMAL
SPECIMEN: ABNORMAL
TOTAL COLONY COUNT: ABNORMAL

## 2019-10-23 ENCOUNTER — TELEPHONE (OUTPATIENT)
Dept: FAMILY MEDICINE CLINIC | Age: 39
End: 2019-10-23

## 2020-11-09 ENCOUNTER — HOSPITAL ENCOUNTER (OUTPATIENT)
Age: 40
Setting detail: SPECIMEN
Discharge: HOME | End: 2020-11-09
Payer: COMMERCIAL

## 2020-11-09 ENCOUNTER — OFFICE VISIT (OUTPATIENT)
Dept: FAMILY MEDICINE CLINIC | Age: 40
End: 2020-11-09
Payer: COMMERCIAL

## 2020-11-09 VITALS — OXYGEN SATURATION: 97 % | TEMPERATURE: 97.3 F | HEART RATE: 90 BPM

## 2020-11-09 PROCEDURE — G8428 CUR MEDS NOT DOCUMENT: HCPCS | Performed by: NURSE PRACTITIONER

## 2020-11-09 PROCEDURE — 99213 OFFICE O/P EST LOW 20 MIN: CPT | Performed by: NURSE PRACTITIONER

## 2020-11-09 PROCEDURE — G8421 BMI NOT CALCULATED: HCPCS | Performed by: NURSE PRACTITIONER

## 2020-11-09 PROCEDURE — U0002 COVID-19 LAB TEST NON-CDC: HCPCS

## 2020-11-09 PROCEDURE — G8484 FLU IMMUNIZE NO ADMIN: HCPCS | Performed by: NURSE PRACTITIONER

## 2020-11-09 PROCEDURE — 1036F TOBACCO NON-USER: CPT | Performed by: NURSE PRACTITIONER

## 2020-11-09 NOTE — PROGRESS NOTES
Pack years: 6.50     Types: Cigarettes     Start date:      Last attempt to quit: 2008     Years since quittin.8    Smokeless tobacco: Never Used   Substance Use Topics    Alcohol use: Yes     Comment: \"Occ. Maybe Once A Year\"    Drug use: No   ,   Family History   Problem Relation Age of Onset    High Blood Pressure Mother     High Cholesterol Mother     Bipolar Disorder Sister     Mental Illness Sister         Bipolar    Diabetes Brother     High Blood Pressure Sister     Stroke Brother     Asthma Daughter    ,   There is no immunization history on file for this patient.,   Health Maintenance   Topic Date Due    Varicella vaccine (1 of 2 - 2-dose childhood series) 1982    HIV screen  1996    DTaP/Tdap/Td vaccine (1 - Tdap) 2000    Cervical cancer screen  2002    Flu vaccine (1) 2020    Hepatitis A vaccine  Aged Out    Hepatitis B vaccine  Aged Out    Hib vaccine  Aged Out    Meningococcal (ACWY) vaccine  Aged Out    Pneumococcal 0-64 years Vaccine  Aged Out       PHYSICAL EXAM:  Physical Exam  Constitutional:       Appearance: Normal appearance. HENT:      Head: Normocephalic. Right Ear: Tympanic membrane, ear canal and external ear normal.      Left Ear: Tympanic membrane, ear canal and external ear normal.      Nose: Nose normal.      Mouth/Throat:      Lips: Pink. Mouth: Mucous membranes are moist.      Pharynx: Oropharynx is clear. Neck:      Musculoskeletal: Neck supple. Cardiovascular:      Rate and Rhythm: Normal rate and regular rhythm. Heart sounds: Normal heart sounds. Pulmonary:      Effort: Pulmonary effort is normal.      Breath sounds: Normal breath sounds. Skin:     General: Skin is warm and dry. Neurological:      Mental Status: She is alert and oriented to person, place, and time.    Psychiatric:         Mood and Affect: Mood normal.         Behavior: Behavior normal.         ASSESSMENT/PLAN:  1. Flu-like symptoms  Your COVID 19 test can take 3-5 days for the results come back. We ask that you make a Mychart page and view your test results this way. You will need to Self quarantine until you know your results. Increase fluids rest  Saline nasal spray as directed  Warm salt gargles for throat discomfort  Monitor temperature twice a day  Tylenol for fevers and/or discomfort. If symptoms are worse -Go to the ER. Follow up with your primary doctor    To Whom it May Concern:    Henry Johns has been tested for COVID on 11/09/20. They may NOT return to work until their lab test results back and they been fever free for 3 days. If test is positive they must stay home for 2 weeks or until they test negative or as directed by the [facility]. - COVID-19 Ambulatory      FOLLOW-UP:  Return if symptoms worsen or fail to improve.     In addition to other information, the printed after visit summary provided to the patient includes:  [x] COVID-19 Self care instructions  [x] COVID-19 General patient information

## 2020-11-09 NOTE — PROGRESS NOTES
11/9/20  Surekha HORACE Cota  1980    FLU/COVID-19 CLINIC EVALUATION    HPI SYMPTOMS:    Employer:[employer]    [x] Fevers  [x] Chills  [] Cough  [] Coughing up blood  [] Chest Congestion  [] Nasal Congestion  [] Feeling short of breath  [] Sometimes  [] Frequently  [] All the time  [x] Chest pain  [x] Headaches  [x]Tolerable  [] Severe  [] Sore throat  [x] Muscle aches  [] Nausea  [] Vomiting  []Unable to keep fluids down  [x] Diarrhea  []Severe    [] OTHER SYMPTOMS:      Symptom Duration:   [] 1  [] 2   [] 3   [] 4    [] 5   [] 6   [] 7   [] 8   [] 9   [] 10   [] 11   [] 12   [] 13   [] 14   [] Longer than 14 days    Symptom course:   [] Worsening     [x] Stable     [] Improving    RISK FACTORS:    [] Pregnant or possibly pregnant  [] Age over 61  [] Diabetes  [] Heart disease  [] Asthma  [] COPD/Other chronic lung diseases  [] Active Cancer  [] On Chemotherapy  [] Taking oral steroids  [] History Lymphoma/Leukemia  [] Close contact with a lab confirmed COVID-19 patient within 14 days of symptom onset  [] History of travel from affected geographical areas within 14 days of symptom onset       VITALS:  There were no vitals filed for this visit. TESTS:    POCT FLU:  [] Positive     []Negative    ASSESSMENT:    [] Flu  [] Possible COVID-19  [] Strep    PLAN:    [] Discharge home with written instructions for:  [] Flu management  [] Possible COVID-19 infection with self-quarantine and management of symptoms  [] Follow-up with primary care physician or emergency department if worsens  [] Evaluation per physician/NP/PA in clinic  [] Sent to ER       An  electronic signature was used to authenticate this note.      --Sarina Mathis MA on 11/9/2020 at 1:27 PM

## 2020-11-11 LAB
SARS-COV-2: DETECTED
SOURCE: ABNORMAL

## 2021-03-05 ENCOUNTER — HOSPITAL ENCOUNTER (OUTPATIENT)
Dept: INFUSION THERAPY | Age: 41
Discharge: HOME | End: 2021-03-05
Payer: COMMERCIAL

## 2021-03-05 ENCOUNTER — TELEPHONE (OUTPATIENT)
Dept: ONCOLOGY | Age: 41
End: 2021-03-05

## 2021-03-05 ENCOUNTER — OFFICE VISIT (OUTPATIENT)
Dept: ONCOLOGY | Age: 41
End: 2021-03-05
Payer: COMMERCIAL

## 2021-03-05 VITALS
SYSTOLIC BLOOD PRESSURE: 112 MMHG | HEIGHT: 67 IN | WEIGHT: 231 LBS | TEMPERATURE: 98.8 F | DIASTOLIC BLOOD PRESSURE: 74 MMHG | OXYGEN SATURATION: 97 % | RESPIRATION RATE: 18 BRPM | BODY MASS INDEX: 36.26 KG/M2 | HEART RATE: 90 BPM

## 2021-03-05 DIAGNOSIS — D50.9 IRON DEFICIENCY ANEMIA, UNSPECIFIED IRON DEFICIENCY ANEMIA TYPE: Primary | ICD-10-CM

## 2021-03-05 DIAGNOSIS — K90.49 MALABSORPTION DUE TO INTOLERANCE, NOT ELSEWHERE CLASSIFIED: ICD-10-CM

## 2021-03-05 PROCEDURE — 99211 OFF/OP EST MAY X REQ PHY/QHP: CPT

## 2021-03-05 PROCEDURE — 99214 OFFICE O/P EST MOD 30 MIN: CPT | Performed by: INTERNAL MEDICINE

## 2021-03-05 PROCEDURE — G8427 DOCREV CUR MEDS BY ELIG CLIN: HCPCS | Performed by: INTERNAL MEDICINE

## 2021-03-05 PROCEDURE — 1036F TOBACCO NON-USER: CPT | Performed by: INTERNAL MEDICINE

## 2021-03-05 PROCEDURE — G8484 FLU IMMUNIZE NO ADMIN: HCPCS | Performed by: INTERNAL MEDICINE

## 2021-03-05 PROCEDURE — G8417 CALC BMI ABV UP PARAM F/U: HCPCS | Performed by: INTERNAL MEDICINE

## 2021-03-05 RX ORDER — HEPARIN SODIUM (PORCINE) LOCK FLUSH IV SOLN 100 UNIT/ML 100 UNIT/ML
500 SOLUTION INTRAVENOUS PRN
Status: CANCELLED | OUTPATIENT
Start: 2021-03-19

## 2021-03-05 RX ORDER — DIPHENHYDRAMINE HYDROCHLORIDE 50 MG/ML
50 INJECTION INTRAMUSCULAR; INTRAVENOUS ONCE
Status: CANCELLED | OUTPATIENT
Start: 2021-03-19 | End: 2021-03-19

## 2021-03-05 RX ORDER — DEXAMETHASONE SODIUM PHOSPHATE 10 MG/ML
10 INJECTION INTRAMUSCULAR; INTRAVENOUS ONCE
Status: CANCELLED
Start: 2021-03-05 | End: 2021-03-05

## 2021-03-05 RX ORDER — SODIUM CHLORIDE 0.9 % (FLUSH) 0.9 %
10 SYRINGE (ML) INJECTION PRN
Status: CANCELLED | OUTPATIENT
Start: 2021-03-19

## 2021-03-05 RX ORDER — SODIUM CHLORIDE 9 MG/ML
INJECTION, SOLUTION INTRAVENOUS CONTINUOUS
Status: CANCELLED | OUTPATIENT
Start: 2021-03-19

## 2021-03-05 RX ORDER — SODIUM CHLORIDE 9 MG/ML
INJECTION, SOLUTION INTRAVENOUS CONTINUOUS
Status: CANCELLED | OUTPATIENT
Start: 2021-03-05

## 2021-03-05 RX ORDER — SODIUM CHLORIDE 0.9 % (FLUSH) 0.9 %
5 SYRINGE (ML) INJECTION PRN
Status: CANCELLED | OUTPATIENT
Start: 2021-03-19

## 2021-03-05 RX ORDER — SODIUM CHLORIDE 0.9 % (FLUSH) 0.9 %
5 SYRINGE (ML) INJECTION PRN
Status: CANCELLED | OUTPATIENT
Start: 2021-03-05

## 2021-03-05 RX ORDER — EPINEPHRINE 1 MG/ML
0.3 INJECTION, SOLUTION, CONCENTRATE INTRAVENOUS PRN
Status: CANCELLED | OUTPATIENT
Start: 2021-03-19

## 2021-03-05 RX ORDER — SODIUM CHLORIDE 0.9 % (FLUSH) 0.9 %
10 SYRINGE (ML) INJECTION PRN
Status: CANCELLED | OUTPATIENT
Start: 2021-03-05

## 2021-03-05 RX ORDER — METHYLPREDNISOLONE SODIUM SUCCINATE 125 MG/2ML
125 INJECTION, POWDER, LYOPHILIZED, FOR SOLUTION INTRAMUSCULAR; INTRAVENOUS ONCE
Status: CANCELLED | OUTPATIENT
Start: 2021-03-19 | End: 2021-03-19

## 2021-03-05 RX ORDER — ACETAMINOPHEN 325 MG/1
650 TABLET ORAL ONCE
Status: CANCELLED
Start: 2021-03-05 | End: 2021-03-05

## 2021-03-05 RX ORDER — MEPERIDINE HYDROCHLORIDE 50 MG/ML
12.5 INJECTION INTRAMUSCULAR; INTRAVENOUS; SUBCUTANEOUS ONCE
Status: CANCELLED | OUTPATIENT
Start: 2021-03-05 | End: 2021-03-05

## 2021-03-05 RX ORDER — METHYLPREDNISOLONE SODIUM SUCCINATE 125 MG/2ML
125 INJECTION, POWDER, LYOPHILIZED, FOR SOLUTION INTRAMUSCULAR; INTRAVENOUS ONCE
Status: CANCELLED | OUTPATIENT
Start: 2021-03-05 | End: 2021-03-05

## 2021-03-05 RX ORDER — EPINEPHRINE 1 MG/ML
0.3 INJECTION, SOLUTION, CONCENTRATE INTRAVENOUS PRN
Status: CANCELLED | OUTPATIENT
Start: 2021-03-05

## 2021-03-05 RX ORDER — DIPHENHYDRAMINE HYDROCHLORIDE 50 MG/ML
50 INJECTION INTRAMUSCULAR; INTRAVENOUS ONCE
Status: CANCELLED | OUTPATIENT
Start: 2021-03-05 | End: 2021-03-05

## 2021-03-05 RX ORDER — HEPARIN SODIUM (PORCINE) LOCK FLUSH IV SOLN 100 UNIT/ML 100 UNIT/ML
500 SOLUTION INTRAVENOUS PRN
Status: CANCELLED | OUTPATIENT
Start: 2021-03-05

## 2021-03-05 NOTE — PROGRESS NOTES
Patient Name: Palmer Garcia  Patient : 1980  Patient MRN: [id number]     Primary Oncologist: Fidelia Forte MD  PCP: Brenda Yang     Date of Service: 3/5/2021      Chief Complaint:   Chief Complaint   Patient presents with    Follow-up        Active Problem list  1. Iron deficiency anemia, unspecified iron deficiency anemia type    2. Malabsorption due to intolerance, not elsewhere classified           HPI:        3/11/19: Arrived alone to the clinic. Reported that she had the gastric sleeve in 2017, lost 45 lbs. Reported that her menstrual periods are heavy at times, associated with a lot of clots, would use 8-10 pads per day for the first three days and then lighter for the next 3-4 days. No other overt bleeding. Reported fatigue. CANDICE sx. reported that she has a craving to smell alcohol pads. Intermittent chest pain. Has been evaluated and was told that she has reflux Sx. Denied any sob. No palpitattions, dizziness. Intermittent lower abdominal pain, but denied any nausea, emesis, constipation or any diarrhea. Denied any  sx. No fevr, night sweats, weight loss, lad. No vision changes, focal weakness. Reported intermittent ha, associated with smelling of alcohol pads. No dysphagia, odynophagia. STNA at acute rehab.  3/4/19: CBC with wbc 5.5, normal diff, Hb 10.1, Hct 41, mcv 75, platelets 137  CMP wnl  Ferritin 5 and iron sats 5%    3/17/2019 CT scan of the abdomen pelvis without contrast revealed nonobstructing 2 mm left renal calculus. Liver spleen pancreas and bilateral adrenal glands are normal. Gallbladder is near completely collapsed. Postsurgical changes of prior gastric sleeve. No suspicious osteolytic or osteoblastic lesions.      Received Ferriheme  CBC with wbc 3.7,anc 1800, Hb 8.8, Hct 29, mcv 69, paltelets 294, ferritin 6, iron sats 4%, b12 451, folate 16, Vitamin D 30    Past Medical History  Prediabetes  Anxiety disorder in the past      Surgical History  Procedure: Laparoscopic adjustable gastric banding (procedure)   Procedure: Ligation of fallopian tube (procedure)   Procedure: Non-surgical breast biopsy (procedure)      Allergies  No known medication allergies    Medications  Ferrous Sulfate Oral 325 mg (65 mg iron) tablet   Vitamin C (Ascorbic Acid Oral)      Social History  Lives with family  Works at [employer] as [address] living children  Quit smoking in 2007, prior to which she smoked 1 pp per two days for on and off for 15 years  No excess etoh or any other illicit drug abuse      Family History  Mother: Hypertension   Brother 1: Cerebrovascular accident, Diabetes   Grandmother - Maternal (2nd Degree): Cerebrovascular accident, Diabetes    No fho of leukemia, lymphoma, naemia, any other blood dyscreasias  No h/o malignancy in the family    Interval History  3/5/2021:Arrived alone to the clinic today. Reported that she has been feeling tired and also reported PICA sx. Not compliant with SlowFe. Reported heavy menstrual periods, clots with it. No other overt bleeding. No chest pain, increased sob, palpitations or any dizziness. No fever, cough. No dysphagia, odynophagia. No abdominal pain, nausea, emesis, diarrhea or any constipation.  No  sx.       Review of Systems   Per interval history; otherwise 10 point ROS is negative              Vital Signs:  /74 (Site: Left Upper Arm, Position: Sitting, Cuff Size: Large Adult)   Pulse 90   Temp 98.8 °F (37.1 °C) (Temporal)   Resp 18   Ht 5' 7\" (1.702 m)   Wt 231 lb (104.8 kg)   SpO2 97%   BMI 36.18 kg/m²     Physical Exam:  CONSTITUTIONAL: awake, alert, cooperative, obese   EYES: palor++, no icterus   ENT: ATNC   NECK: no JVD   HEMATOLOGIC/LYMPHATIC: no cervical, supraclavicular or axillary lymphadenopathy   LUNGS:ctab   CARDIOVASCULAR: s1s2 rrr no murmurs  ABDOMEN: soft ntnd bs pos  NEUROLOGIC: GI  SKIN: No rash  EXTREMITIES: no LE edema bilaterally       Labs:    Hematology:  Lab Results Component Value Date    WBC 6.3 01/23/2019    RBC 4.49 01/23/2019    HGB 10.4 (L) 01/23/2019    HCT 35.3 (L) 01/23/2019    MCV 78.6 01/23/2019    MCH 23.2 (L) 01/23/2019    MCHC 29.5 (L) 01/23/2019    RDW 24.4 (H) 01/23/2019     01/23/2019    MPV 10.6 01/23/2019    SEGSPCT 53.0 01/23/2019    EOSRELPCT 2.4 01/23/2019    BASOPCT 0.6 01/23/2019    LYMPHOPCT 36.8 01/23/2019    MONOPCT 6.9 (H) 01/23/2019    SEGSABS 3.3 01/23/2019    EOSABS 0.2 01/23/2019    BASOSABS 0.0 01/23/2019    LYMPHSABS 2.3 01/23/2019    MONOSABS 0.4 01/23/2019    DIFFTYPE AUTOMATED DIFFERENTIAL 01/23/2019     Lab Results   Component Value Date    ESR 11 03/11/2019       Chemistry:  Lab Results   Component Value Date     05/06/2019    K 4.6 05/06/2019     05/06/2019    CO2 29 05/06/2019    BUN 9 05/06/2019    CREATININE 0.7 05/06/2019    GLUCOSE 105 (H) 05/06/2019    CALCIUM 9.6 05/06/2019    PROT 7.1 05/06/2019    LABALBU 4.4 05/06/2019    BILITOT 0.3 05/06/2019    ALKPHOS 57 05/06/2019    AST 11 (L) 05/06/2019    ALT 10 05/06/2019    LABGLOM >60 05/06/2019    GFRAA >60 05/06/2019    MG 1.9 02/09/2018    POCGLU 71 09/06/2017     Lab Results   Component Value Date     03/11/2019     No components found for: LD  Lab Results   Component Value Date    TSHHS 1.530 03/11/2019       Immunology:  Lab Results   Component Value Date    PROT 7.1 05/06/2019    SPEP INTERPRETATION - Within normal limits. RS 03/11/2019    SPEP INTERPRETATION - Within normal limits.   RS 03/11/2019    ALBUMINELP 3.9 03/11/2019    LABALPH 0.3 03/11/2019    LABALPH 0.6 03/11/2019    LABBETA 1.2 03/11/2019    GAMGLOB 1.5 03/11/2019     No results found for: RULA Avila  No results found for: B2M    Coagulation Panel:  Lab Results   Component Value Date    DDIMER <200 05/21/2016       Anemia Panel:  Lab Results   Component Value Date    WATLOPGX69 817.4 03/11/2019    FOLATE 14.8 03/11/2019       Tumor Markers:  No results found for:

## 2021-03-05 NOTE — PROGRESS NOTES
MA Rooming Questions  Patient: Naila Ruiz  MRN: [id number]    Date: 3/5/2021        1. Do you have any new issues?   no         2. Do you need any refills on medications?    no    3. Have you had any imaging done since your last visit?   no    4. Have you been hospitalized or seen in the emergency room since your last visit here?   no    5. Did the patient have a depression screening completed today?  No    No data recorded     PHQ-9 Given to (if applicable):               PHQ-9 Score (if applicable):                     [] Positive     []  Negative              Does question #9 need addressed (if applicable)                     [] Yes    []  No               Neo Teran MA

## 2021-03-17 ENCOUNTER — HOSPITAL ENCOUNTER (OUTPATIENT)
Dept: INFUSION THERAPY | Age: 41
Discharge: HOME | End: 2021-03-17
Payer: COMMERCIAL

## 2021-03-17 VITALS
SYSTOLIC BLOOD PRESSURE: 120 MMHG | HEIGHT: 67 IN | BODY MASS INDEX: 34.84 KG/M2 | DIASTOLIC BLOOD PRESSURE: 63 MMHG | OXYGEN SATURATION: 100 % | HEART RATE: 93 BPM | TEMPERATURE: 97.6 F | WEIGHT: 222 LBS

## 2021-03-17 DIAGNOSIS — D50.9 IRON DEFICIENCY ANEMIA, UNSPECIFIED IRON DEFICIENCY ANEMIA TYPE: ICD-10-CM

## 2021-03-17 DIAGNOSIS — K90.49 MALABSORPTION DUE TO INTOLERANCE, NOT ELSEWHERE CLASSIFIED: Primary | ICD-10-CM

## 2021-03-17 PROCEDURE — 96367 TX/PROPH/DG ADDL SEQ IV INF: CPT

## 2021-03-17 PROCEDURE — 96375 TX/PRO/DX INJ NEW DRUG ADDON: CPT

## 2021-03-17 PROCEDURE — 2580000003 HC RX 258: Performed by: INTERNAL MEDICINE

## 2021-03-17 PROCEDURE — 6370000000 HC RX 637 (ALT 250 FOR IP): Performed by: INTERNAL MEDICINE

## 2021-03-17 PROCEDURE — 96374 THER/PROPH/DIAG INJ IV PUSH: CPT

## 2021-03-17 PROCEDURE — 96366 THER/PROPH/DIAG IV INF ADDON: CPT

## 2021-03-17 PROCEDURE — 6360000002 HC RX W HCPCS: Performed by: INTERNAL MEDICINE

## 2021-03-17 RX ORDER — SODIUM CHLORIDE 9 MG/ML
INJECTION, SOLUTION INTRAVENOUS CONTINUOUS
Status: CANCELLED | OUTPATIENT
Start: 2021-03-24

## 2021-03-17 RX ORDER — EPINEPHRINE 1 MG/ML
0.3 INJECTION, SOLUTION, CONCENTRATE INTRAVENOUS PRN
Status: CANCELLED | OUTPATIENT
Start: 2021-03-24

## 2021-03-17 RX ORDER — ACETAMINOPHEN 325 MG/1
650 TABLET ORAL ONCE
Status: COMPLETED | OUTPATIENT
Start: 2021-03-17 | End: 2021-03-17

## 2021-03-17 RX ORDER — DIPHENHYDRAMINE HYDROCHLORIDE 50 MG/ML
50 INJECTION INTRAMUSCULAR; INTRAVENOUS ONCE
Status: CANCELLED | OUTPATIENT
Start: 2021-03-24 | End: 2021-03-24

## 2021-03-17 RX ORDER — DEXAMETHASONE SODIUM PHOSPHATE 10 MG/ML
10 INJECTION INTRAMUSCULAR; INTRAVENOUS ONCE
Status: DISCONTINUED | OUTPATIENT
Start: 2021-03-17 | End: 2021-03-17 | Stop reason: SDUPTHER

## 2021-03-17 RX ORDER — DEXAMETHASONE SODIUM PHOSPHATE 10 MG/ML
10 INJECTION INTRAMUSCULAR; INTRAVENOUS ONCE
Status: CANCELLED
Start: 2021-03-24 | End: 2021-03-24

## 2021-03-17 RX ORDER — MEPERIDINE HYDROCHLORIDE 25 MG/ML
12.5 INJECTION INTRAMUSCULAR; INTRAVENOUS; SUBCUTANEOUS ONCE
Status: CANCELLED | OUTPATIENT
Start: 2021-03-24 | End: 2021-03-24

## 2021-03-17 RX ORDER — SODIUM CHLORIDE 0.9 % (FLUSH) 0.9 %
10 SYRINGE (ML) INJECTION PRN
Status: CANCELLED | OUTPATIENT
Start: 2021-03-24

## 2021-03-17 RX ORDER — METHYLPREDNISOLONE SODIUM SUCCINATE 125 MG/2ML
125 INJECTION, POWDER, LYOPHILIZED, FOR SOLUTION INTRAMUSCULAR; INTRAVENOUS ONCE
Status: CANCELLED | OUTPATIENT
Start: 2021-03-24 | End: 2021-03-24

## 2021-03-17 RX ORDER — SODIUM CHLORIDE 9 MG/ML
INJECTION, SOLUTION INTRAVENOUS CONTINUOUS
Status: ACTIVE | OUTPATIENT
Start: 2021-03-17 | End: 2021-03-17

## 2021-03-17 RX ORDER — HEPARIN SODIUM (PORCINE) LOCK FLUSH IV SOLN 100 UNIT/ML 100 UNIT/ML
500 SOLUTION INTRAVENOUS PRN
Status: CANCELLED | OUTPATIENT
Start: 2021-03-24

## 2021-03-17 RX ORDER — ACETAMINOPHEN 325 MG/1
650 TABLET ORAL ONCE
Status: CANCELLED
Start: 2021-03-24 | End: 2021-03-24

## 2021-03-17 RX ORDER — SODIUM CHLORIDE 0.9 % (FLUSH) 0.9 %
5 SYRINGE (ML) INJECTION PRN
Status: CANCELLED | OUTPATIENT
Start: 2021-03-24

## 2021-03-17 RX ADMIN — SODIUM CHLORIDE 25 MG: 9 INJECTION, SOLUTION INTRAVENOUS at 10:18

## 2021-03-17 RX ADMIN — DEXAMETHASONE SODIUM PHOSPHATE 10 MG: 10 INJECTION INTRAMUSCULAR; INTRAVENOUS at 09:48

## 2021-03-17 RX ADMIN — SODIUM CHLORIDE 975 MG: 9 INJECTION, SOLUTION INTRAVENOUS at 11:42

## 2021-03-17 RX ADMIN — ACETAMINOPHEN 650 MG: 325 TABLET ORAL at 09:45

## 2021-03-17 RX ADMIN — SODIUM CHLORIDE: 9 INJECTION, SOLUTION INTRAVENOUS at 09:45

## 2021-03-17 ASSESSMENT — PAIN SCALES - GENERAL
PAINLEVEL_OUTOF10: 0
PAINLEVEL_OUTOF10: 0

## 2021-08-09 ENCOUNTER — TELEPHONE (OUTPATIENT)
Dept: ONCOLOGY | Age: 41
End: 2021-08-09

## 2021-08-23 ENCOUNTER — TELEPHONE (OUTPATIENT)
Dept: ONCOLOGY | Age: 41
End: 2021-08-23

## 2021-09-21 ENCOUNTER — INITIAL CONSULT (OUTPATIENT)
Dept: CARDIOLOGY CLINIC | Age: 41
End: 2021-09-21
Payer: COMMERCIAL

## 2021-09-21 VITALS
BODY MASS INDEX: 35.31 KG/M2 | DIASTOLIC BLOOD PRESSURE: 76 MMHG | WEIGHT: 225 LBS | HEART RATE: 92 BPM | SYSTOLIC BLOOD PRESSURE: 112 MMHG | HEIGHT: 67 IN

## 2021-09-21 DIAGNOSIS — Z98.84 STATUS POST BARIATRIC SURGERY: ICD-10-CM

## 2021-09-21 DIAGNOSIS — R07.9 CHEST PAIN, UNSPECIFIED TYPE: Primary | ICD-10-CM

## 2021-09-21 DIAGNOSIS — G47.33 OSA (OBSTRUCTIVE SLEEP APNEA): ICD-10-CM

## 2021-09-21 DIAGNOSIS — I10 HTN, GOAL BELOW 140/90: ICD-10-CM

## 2021-09-21 DIAGNOSIS — R07.2 PRECORDIAL PAIN: ICD-10-CM

## 2021-09-21 PROCEDURE — 99204 OFFICE O/P NEW MOD 45 MIN: CPT | Performed by: INTERNAL MEDICINE

## 2021-09-21 PROCEDURE — G8427 DOCREV CUR MEDS BY ELIG CLIN: HCPCS | Performed by: INTERNAL MEDICINE

## 2021-09-21 PROCEDURE — 93000 ELECTROCARDIOGRAM COMPLETE: CPT | Performed by: INTERNAL MEDICINE

## 2021-09-21 PROCEDURE — G8417 CALC BMI ABV UP PARAM F/U: HCPCS | Performed by: INTERNAL MEDICINE

## 2021-09-21 RX ORDER — PANTOPRAZOLE SODIUM 20 MG/1
20 TABLET, DELAYED RELEASE ORAL
Qty: 90 TABLET | Refills: 0 | Status: SHIPPED | OUTPATIENT
Start: 2021-09-21 | End: 2021-10-04

## 2021-09-21 RX ORDER — NITROGLYCERIN 0.4 MG/1
0.4 TABLET SUBLINGUAL EVERY 5 MIN PRN
Qty: 25 TABLET | Refills: 3 | Status: SHIPPED | OUTPATIENT
Start: 2021-09-21

## 2021-09-21 NOTE — ASSESSMENT & PLAN NOTE
Given history of gastric sleeve surgery and episodes of symptoms early in the morning & concern for possible reflux symptoms?   And start Protonix get a treadmill for stratification but also start metoprolol 25 mg twice daily and use nitro as needed

## 2021-09-21 NOTE — PATIENT INSTRUCTIONS
**It is YOUR responsibilty to bring medication bottles and/or updated medication list to [address] [place] [address]. This will allow us to better serve you and all your healthcare needs**  Please be informed that if you contact our office outside of normal business hours the physician on call cannot help with any scheduling or rescheduling issues, procedure instruction questions or any type of medication issue. We advise you for any urgent/emergency that you go to the nearest emergency room! PLEASE CALL OUR OFFICE DURING NORMAL BUSINESS HOURS    Monday - Friday   8 am to 5 pm    [place]: [phone]    [place]:  [phone]    Please hold on to these instructions the  will call you within 1-9 business days when we receive authorization from your insurance. Echocardiogram    WHAT TO EXPECT:   ? This test will take approximately 45 minutes. ? It is an ultrasound of the heart. ? It can look at the valves and chambers inside the heart   ? There is no special instructions for this test.     If you are unable to keep this appointment, please notify us 24 hours prior to test at [phone]. Please hold on to these instructions the  will call you within 1-9 business days when we receive authorization from your insurance. Treadmill Stress test    WHAT TO EXPECT:     The exercise stress test is a test used to provide information about how the heart responds to exertion. It involves walking on a treadmill at increasing levels of difficulty, while electrocardiogram, heart rate, and blood pressure are monitored. ? This test will take approximately 1 hours: Please arrive at the office 5-10 min before the scheduled testing time. ? Once you are taken back to the stress lab you will be asked to read and sign a consent form before proceeding with the test. At this time feel free to ask any question that you may have as the procedure is explained to you.   ?  You will be

## 2021-09-21 NOTE — ASSESSMENT & PLAN NOTE
Reports compliance with CPAP, get echo to evaluate for right-sided pressures and chamber enlargement?

## 2021-09-21 NOTE — PROGRESS NOTES
CARDIOLOGY CONSULT   NOTE    Chief Complaint: chest pain     HPI:   Mercedes Zapata is a 37y.o. year old who has Past medical history as noted below. She reports she gest chest pain off and on in middle of the chest , she says it started yesterday and is still on going , she says it was 8/10 and occasionally radiates to upper back between shoulder blades , she describes it as a dull ache associated with SOB  It started yesterday after eating , tums helped but it took a long time. She had gastric sleeve surgery in 2016 , she had normal stress test in  2017 . He also reports about a year ago she woke up in the morning with jaw pain and associated nausea and then vomited  She denies any family history cardiac problems but younger brother had stroke when he was 15 .  She denies any alcoholc or smoking       Current Outpatient Medications   Medication Sig Dispense Refill    pantoprazole (PROTONIX) 20 MG tablet Take 1 tablet by mouth every morning (before breakfast) 90 tablet 0    nitroGLYCERIN (NITROSTAT) 0.4 MG SL tablet Place 1 tablet under the tongue every 5 minutes as needed for Chest pain 25 tablet 3    metoprolol tartrate (LOPRESSOR) 25 MG tablet Take 1 tablet by mouth 2 times daily 60 tablet 2    ibuprofen (ADVIL;MOTRIN) 800 MG tablet Take 1 tablet by mouth every 6 hours as needed for Pain 30 tablet 0    Ferrous Sulfate (IRON) 142 (45 Fe) MG TBCR Take by mouth      Multiple Vitamins-Minerals (HM MULTIVITAMIN ADULT GUMMY PO) Take 2 Doses by mouth      Ascorbic Acid (VITAMIN C) 250 MG tablet Take 250 mg by mouth 2 times daily (Patient not taking: Reported on 9/21/2021)      Cholecalciferol (VITAMIN D) 2000 units CAPS capsule Take 2,000 Units by mouth daily (Patient not taking: Reported on 9/21/2021)      naproxen (NAPROSYN) 500 MG tablet Take 1 tablet by mouth 2 times daily (Patient not taking: Reported on 9/21/2021) 60 tablet 0    calcium 600 MG TABS tablet Take 1 tablet by mouth 2 times daily as needed (Patient not taking: Reported on 2021)      zinc 50 MG CAPS Take 1 capsule by mouth daily (Patient not taking: Reported on 2021)      vitamin B-12 (CYANOCOBALAMIN) 1000 MCG tablet Take 1,000 mcg by mouth daily (Patient not taking: Reported on 2021)       No current facility-administered medications for this visit. Allergies:   Penicillins    Patient History:  Past Medical History:   Diagnosis Date    Anemia     Anxiety     Arthritis     \"Left Hip\"    Diabetes ([address] Dx     Hiatal hernia     Hyperlipidemia     \"They Put Me On Lipitor Because I'm Diabetic, I Have Never Had High Cholesterol\"    Sleep apnea     Uses CPAP    Teeth missing     Upper    Wears glasses     Wears partial dentures     Upper     Past Surgical History:   Procedure Laterality Date    BREAST BIOPSY Bilateral 2006    Benign    DENTAL SURGERY      Teeth Extracted In The Past    ENDOSCOPY, COLON, DIAGNOSTIC      HERNIA REPAIR      SLEEVE GASTRECTOMY  2017    Robotic sleeve gastrectomy and hiatal hernia repair    TUBAL LIGATION       Family History   Problem Relation Age of Onset    High Blood Pressure Mother     High Cholesterol Mother     Bipolar Disorder Sister     Mental Illness Sister         Bipolar    Diabetes Brother     High Blood Pressure Sister     Stroke Brother     Asthma Daughter      Social History     Tobacco Use    Smoking status: Former Smoker     Packs/day: 0.50     Years: 13.00     Pack years: 6.50     Types: Cigarettes     Start date:      Quit date:      Years since quittin.7    Smokeless tobacco: Never Used   Substance Use Topics    Alcohol use: Yes     Comment: \"Occ.  Maybe Once A Year\"        Review of Systems:   · Constitutional: No Fever or Weight Loss   · Eyes: No Decreased Vision  · ENT: No Headaches, Hearing Loss or Vertigo  · Cardiovascular: as per note above   · Respiratory: No cough or wheezing and as per note above. · Gastrointestinal: No abdominal pain, appetite loss, blood in stools, constipation, diarrhea or heartburn  · Genitourinary: No dysuria, trouble voiding, or hematuria  · Musculoskeletal:  denies any new  joint aches , swelling  or pain   · Integumentary: No rash or pruritis  · Neurological: No TIA or stroke symptoms  · Psychiatric: No anxiety or depression  · Endocrine: No malaise, fatigue or temperature intolerance  · Hematologic/Lymphatic: No bleeding problems, blood clots or swollen lymph nodes  · Allergic/Immunologic: No nasal congestion or hives    Objective:      Physical Exam:  /76 (Site: Right Upper Arm, Position: Sitting, Cuff Size: Large Adult)   Pulse 92   Ht 5' 7\" (1.702 m)   Wt 225 lb (102.1 kg)   BMI 35.24 kg/m²   Wt Readings from Last 3 Encounters:   09/21/21 225 lb (102.1 kg)   03/17/21 222 lb (100.7 kg)   03/05/21 231 lb (104.8 kg)     Body mass index is 35.24 kg/m². Vitals:    09/21/21 1357   BP: 112/76   Pulse: 92        General Appearance:  No distress, conversant  Constitutional:  Well developed, Well nourished, No acute distress, Non-toxic appearance. HENT:  Normocephalic, Atraumatic, Bilateral external ears normal, Oropharynx moist, No oral exudates, Nose normal. Neck- Normal range of motion, No tenderness, Supple, No stridor,no apical-carotid delay  Eyes:  PERRL, EOMI, Conjunctiva normal, No discharge. Respiratory:  Normal breath sounds, No respiratory distress, No wheezing, No chest tenderness. ,no use of accessory muscles, NO crackles  Cardiovascular: (PMI) apex non displaced,no lifts no thrills,S1 and S2 audible, No added heart sounds, No signs of ankle edema, or volume overload, No evidence of JVD, No crackles  GI:  Bowel sounds normal, Soft, No tenderness, No masses, No gross visceromegaly   :  No costovertebral angle tenderness   Musculoskeletal:  No edema, no tenderness, no deformities.  Back- no tenderness  Integument:  Well hydrated, no rash Lymphatic:  No lymphadenopathy noted   Neurologic:  Alert & oriented x 3, CN 2-12 normal, normal motor function, normal sensory function, no focal deficits noted   Psychiatric:  Speech and behavior appropriate       Medical decision making and Data review:  DATA:  Lab Results   Component Value Date    TROPONINT <0.010 01/23/2019     BNP:    Lab Results   Component Value Date    PROBNP 13.64 05/21/2016     PT/INR:  No results found for: Handprint  Lab Results   Component Value Date    LABA1C 5.3 02/09/2018    LABA1C 7.0 (H) 09/05/2017     Lab Results   Component Value Date    CHOL 156 10/27/2016    TRIG 168 (H) 10/27/2016    HDL 46 10/27/2016    LDLDIRECT 87 10/27/2016     Lab Results   Component Value Date    ALT 10 05/06/2019    AST 11 (L) 05/06/2019     No results for input(s): WBC, HGB, HCT, MCV, PLT in the last 72 hours. TSH: No results found for: TSH  Lab Results   Component Value Date    AST 11 (L) 05/06/2019    ALT 10 05/06/2019    BILIDIR 0.2 02/09/2018    BILITOT 0.3 05/06/2019    ALKPHOS 57 05/06/2019     Lab Results   Component Value Date    PROBNP 13.64 05/21/2016     Lab Results   Component Value Date    LABA1C 5.3 02/09/2018    LABA1C 7.0 (H) 09/05/2017     Lab Results   Component Value Date    WBC 6.3 01/23/2019    HGB 10.4 (L) 01/23/2019    HCT 35.3 (L) 01/23/2019     01/23/2019     All labs, medications and tests reviewed by myself including data and history from outside source , patient and available family . Assessment & Plan:      1. Chest pain, unspecified type    2. JERMAINE (obstructive sleep apnea)    3. Status post bariatric surgery    4. Precordial pain    5. HTN, goal below 140/90         Precordial pain   Given history of gastric sleeve surgery and episodes of symptoms early in the morning & concern for possible reflux symptoms?   And start Protonix get a treadmill for stratification but also start metoprolol 25 mg twice daily and use nitro as needed    JERMAINE (obstructive sleep apnea) Reports compliance with CPAP, get echo to evaluate for right-sided pressures and chamber enlargement? Dyslipidemia :  All available lab work was reviewed. Patient was advised to repeat lab work before next visit. Necessary orders were placed , instructions given by myself       Counseled extensively and medication compliance urged. We discussed that for the  prevention of ASCVD our  goal is aggressive risk modification. Patient is encouraged to exercise if they can , educated about  brisk walk for 30 minutes  at least 3 to 4 times a week if there are no physical limitations  Various goals were discussed and questions answered. Continue current medications. Appropriate prescriptions are addressed and refills ordered. Questions answered and patient verbalizes understanding. Call for any problems, questions, or concerns. Greater than 60 % of time spent counseling besides reviewing data and images     Continue all other medications of all above medical condition listed as is. Return in about 1 month (around 10/21/2021). Please note this report has been partially produced using speech recognition software and may contain errors related to that system including errors in grammar, punctuation, and spelling, as well as words and phrases that may be inappropriate. If there are any questions or concerns please feel free to contact the dictating provider for clarification.

## 2021-09-28 ENCOUNTER — OFFICE VISIT (OUTPATIENT)
Dept: OBGYN | Age: 41
End: 2021-09-28
Payer: COMMERCIAL

## 2021-09-28 ENCOUNTER — HOSPITAL ENCOUNTER (OUTPATIENT)
Age: 41
Setting detail: SPECIMEN
Discharge: HOME | End: 2021-09-28
Payer: COMMERCIAL

## 2021-09-28 VITALS
DIASTOLIC BLOOD PRESSURE: 75 MMHG | HEIGHT: 67 IN | BODY MASS INDEX: 35.94 KG/M2 | SYSTOLIC BLOOD PRESSURE: 116 MMHG | WEIGHT: 229 LBS

## 2021-09-28 DIAGNOSIS — Z30.09 FAMILY PLANNING COUNSELING: ICD-10-CM

## 2021-09-28 DIAGNOSIS — Z12.31 SCREENING MAMMOGRAM, ENCOUNTER FOR: ICD-10-CM

## 2021-09-28 DIAGNOSIS — Z01.419 ENCOUNTER FOR ANNUAL ROUTINE GYNECOLOGICAL EXAMINATION: Primary | ICD-10-CM

## 2021-09-28 DIAGNOSIS — N97.9 INFERTILITY, FEMALE: ICD-10-CM

## 2021-09-28 DIAGNOSIS — Z11.51 SPECIAL SCREENING EXAMINATION FOR HUMAN PAPILLOMAVIRUS (HPV): ICD-10-CM

## 2021-09-28 DIAGNOSIS — N92.6 IRREGULAR MENSES: ICD-10-CM

## 2021-09-28 LAB
CONTROL: NORMAL
PREGNANCY TEST URINE, POC: NEGATIVE

## 2021-09-28 PROCEDURE — 87624 HPV HI-RISK TYP POOLED RSLT: CPT

## 2021-09-28 PROCEDURE — 88142 CYTOPATH C/V THIN LAYER: CPT

## 2021-09-28 PROCEDURE — 99386 PREV VISIT NEW AGE 40-64: CPT | Performed by: OBSTETRICS & GYNECOLOGY

## 2021-09-28 PROCEDURE — 81025 URINE PREGNANCY TEST: CPT | Performed by: OBSTETRICS & GYNECOLOGY

## 2021-09-28 RX ORDER — PNV NO.95/FERROUS FUM/FOLIC AC 28MG-0.8MG
1 TABLET ORAL DAILY
Qty: 30 TABLET | Refills: 11 | Status: SHIPPED | OUTPATIENT
Start: 2021-09-28 | End: 2022-09-22

## 2021-09-28 RX ORDER — LETROZOLE 2.5 MG/1
5 TABLET, FILM COATED ORAL DAILY
Qty: 10 TABLET | Refills: 5 | Status: SHIPPED | OUTPATIENT
Start: 2021-09-28 | End: 2022-03-14

## 2021-09-28 ASSESSMENT — ENCOUNTER SYMPTOMS
EYES NEGATIVE: 1
RESPIRATORY NEGATIVE: 1
GASTROINTESTINAL NEGATIVE: 1
ALLERGIC/IMMUNOLOGIC NEGATIVE: 1

## 2021-09-28 NOTE — PROGRESS NOTES
21    Oralia Mera  1980    Chief Complaint   Patient presents with    Gynecologic Exam     Normal Menses, LMP 2021, sexually active, Last pap smear was 2018 normal    Infertility     Pt had tubal reversal on 2021, Pt has been active trying to conceive. The patient is a 37 y.o. female, [id number] who presents for her annual exam.  She normally but slightly heavy. .  She is  sexually active. She is not currently taking birth control. She reports no gynecological symptoms. Pap smear history: Her last PAP smear was in 2018.   Her results were normal.    Breast history: she has never had a mammogram    Past Medical History:   Diagnosis Date    Anemia     Anxiety     Arthritis     \"Left Hip\"    Diabetes ([address] Dx     Hiatal hernia     Hyperlipidemia     \"They Put Me On Lipitor Because I'm Diabetic, I Have Never Had High Cholesterol\"    Infertility, female     Sleep apnea     Uses CPAP    Teeth missing     Upper    Wears glasses     Wears partial dentures     Upper       Past Surgical History:   Procedure Laterality Date    BREAST BIOPSY Bilateral     Benign    DENTAL SURGERY      Teeth Extracted In The Past    ENDOSCOPY, COLON, DIAGNOSTIC      HERNIA REPAIR      SLEEVE GASTRECTOMY  2017    Robotic sleeve gastrectomy and hiatal hernia repair    TUBAL LIGATION  2005    TUBAL LIGATION      reversal       Family History   Problem Relation Age of Onset    High Blood Pressure Mother     High Cholesterol Mother     Bipolar Disorder Sister     Mental Illness Sister         Bipolar    Diabetes Brother     High Blood Pressure Sister     Stroke Brother     Asthma Daughter        Social History     Tobacco Use    Smoking status: Former Smoker     Packs/day: 0.50     Years: 13.00     Pack years: 6.50     Types: Cigarettes     Start date:      Quit date:      Years since quittin.7    Smokeless tobacco: Never Used   Vaping Use    Vaping Use: Never used   Substance Use Topics    Alcohol use: Yes     Comment: \"Occ. Maybe Once A Year\"    Drug use: No       Current Outpatient Medications   Medication Sig Dispense Refill    Prenatal Vit-Fe Fumarate-FA (PRENATAL VITAMINS) 28-0.8 MG TABS Take 1 tablet by mouth daily (any prenatal vitamin covered) 30 tablet 11    letrozole (FEMARA) 2.5 MG tablet Take 2 tablets by mouth daily On cycles days 3-7 10 tablet 5    Ferrous Sulfate (IRON) 142 (45 Fe) MG TBCR Take by mouth      Multiple Vitamins-Minerals (HM MULTIVITAMIN ADULT GUMMY PO) Take 2 Doses by mouth      pantoprazole (PROTONIX) 20 MG tablet Take 1 tablet by mouth every morning (before breakfast) (Patient not taking: Reported on 2021) 90 tablet 0    nitroGLYCERIN (NITROSTAT) 0.4 MG SL tablet Place 1 tablet under the tongue every 5 minutes as needed for Chest pain (Patient not taking: Reported on 2021) 25 tablet 3    metoprolol tartrate (LOPRESSOR) 25 MG tablet Take 1 tablet by mouth 2 times daily (Patient not taking: Reported on 2021) 60 tablet 2     No current facility-administered medications for this visit. Allergies   Allergen Reactions    Penicillins Shortness Of Breath             There is no immunization history on file for this patient. Review of Systems   Constitutional: Negative. Eyes: Negative. Respiratory: Negative. Cardiovascular: Negative. Gastrointestinal: Negative. Endocrine: Negative. Genitourinary: Negative. Musculoskeletal: Negative. Skin: Negative. Allergic/Immunologic: Negative. Neurological: Negative. Hematological: Negative. Psychiatric/Behavioral: Negative. /75   Ht 5' 7\" (1.702 m)   Wt 229 lb (103.9 kg)   LMP 2021   BMI 35.87 kg/m²     Physical Exam  Exam conducted with a chaperone present. Constitutional:       Appearance: Normal appearance. HENT:      Head: Normocephalic and atraumatic.       Nose: Nose normal.   Eyes: Conjunctiva/sclera: Conjunctivae normal.   Cardiovascular:      Rate and Rhythm: Normal rate. Pulses: Normal pulses. Pulmonary:      Effort: Pulmonary effort is normal.   Chest:      Breasts:         Right: Normal.         Left: Normal.   Abdominal:      General: Abdomen is flat. Bowel sounds are normal.      Palpations: Abdomen is soft. Hernia: There is no hernia in the left inguinal area or right inguinal area. Genitourinary:     General: Normal vulva. Exam position: Lithotomy position. Labia:         Right: No rash, tenderness or lesion. Left: No rash, tenderness or lesion. Urethra: No prolapse. Vagina: Normal. No foreign body. No vaginal discharge, erythema, tenderness, bleeding, lesions or prolapsed vaginal walls. Cervix: No cervical motion tenderness, discharge, friability, lesion, erythema or cervical bleeding. Uterus: Normal. Not enlarged, not tender and no uterine prolapse. Adnexa: Right adnexa normal and left adnexa normal.        Right: No mass, tenderness or fullness. Left: No mass, tenderness or fullness. Musculoskeletal:      Cervical back: Normal range of motion and neck supple. Lymphadenopathy:      Upper Body:      Right upper body: No supraclavicular, axillary or pectoral adenopathy. Left upper body: No supraclavicular, axillary or pectoral adenopathy. Skin:     General: Skin is warm. Coloration: Skin is not ashen or cyanotic. Findings: No abrasion, abscess or bruising. Rash is not crusting or macular. Neurological:      Mental Status: She is alert and oriented to person, place, and time. Results for orders placed or performed in visit on 09/28/21   POCT urine pregnancy   Result Value Ref Range    Preg Test, Ur negative     Control         Assessment and Plan   Diagnosis Orders   1. Encounter for annual routine gynecological examination  PAP SMEAR   2.  Screening mammogram, encounter for  [facility] DIGITAL SCREEN W OR WO CAD BILATERAL   3. Irregular menses  Progesterone    Progesterone    Prolactin    TSH WITH REFLEX TO FT4    Testosterone, free, total    Follicle Stimulating Hormone    HCG Qualitative, Serum    Prenatal Vit-Fe Fumarate-FA (PRENATAL VITAMINS) 28-0.8 MG TABS    letrozole (FEMARA) 2.5 MG tablet    US NON OB TRANSVAGINAL   4. Special screening examination for human papillomavirus (HPV)  PAP SMEAR   5. Infertility, female  POCT urine pregnancy   6. Family planning counseling       S/p tubal reversal.  Discussed risk of aneuploidy  Discussed risk of twins  Discussed risk of ectopic  Return in about 1 year (around 9/28/2022).     Michelle Gillette MD

## 2021-09-29 ENCOUNTER — TELEPHONE (OUTPATIENT)
Dept: ONCOLOGY | Age: 41
End: 2021-09-29

## 2021-09-29 ENCOUNTER — OFFICE VISIT (OUTPATIENT)
Dept: ONCOLOGY | Age: 41
End: 2021-09-29
Payer: COMMERCIAL

## 2021-09-29 ENCOUNTER — HOSPITAL ENCOUNTER (OUTPATIENT)
Dept: INFUSION THERAPY | Age: 41
Discharge: HOME | End: 2021-09-29
Payer: COMMERCIAL

## 2021-09-29 VITALS
WEIGHT: 225 LBS | HEIGHT: 67 IN | HEART RATE: 86 BPM | SYSTOLIC BLOOD PRESSURE: 133 MMHG | OXYGEN SATURATION: 97 % | BODY MASS INDEX: 35.31 KG/M2 | DIASTOLIC BLOOD PRESSURE: 65 MMHG | TEMPERATURE: 97.2 F

## 2021-09-29 DIAGNOSIS — D50.9 IRON DEFICIENCY ANEMIA, UNSPECIFIED IRON DEFICIENCY ANEMIA TYPE: ICD-10-CM

## 2021-09-29 DIAGNOSIS — D50.9 IRON DEFICIENCY ANEMIA, UNSPECIFIED IRON DEFICIENCY ANEMIA TYPE: Primary | ICD-10-CM

## 2021-09-29 DIAGNOSIS — K90.49 MALABSORPTION DUE TO INTOLERANCE, NOT ELSEWHERE CLASSIFIED: Primary | ICD-10-CM

## 2021-09-29 DIAGNOSIS — K90.49 MALABSORPTION DUE TO INTOLERANCE, NOT ELSEWHERE CLASSIFIED: ICD-10-CM

## 2021-09-29 LAB
ALBUMIN SERPL-MCNC: 4.2 GM/DL (ref 3.4–5)
ALP BLD-CCNC: 70 IU/L (ref 40–129)
ALT SERPL-CCNC: 10 U/L (ref 10–40)
ANION GAP SERPL CALCULATED.3IONS-SCNC: 10 MMOL/L (ref 4–16)
AST SERPL-CCNC: 13 IU/L (ref 15–37)
BASOPHILS ABSOLUTE: 0 K/CU MM
BASOPHILS RELATIVE PERCENT: 0.4 % (ref 0–1)
BILIRUB SERPL-MCNC: 0.6 MG/DL (ref 0–1)
BUN BLDV-MCNC: 8 MG/DL (ref 6–23)
CALCIUM SERPL-MCNC: 8.8 MG/DL (ref 8.3–10.6)
CHLORIDE BLD-SCNC: 99 MMOL/L (ref 99–110)
CO2: 23 MMOL/L (ref 21–32)
CREAT SERPL-MCNC: 0.4 MG/DL (ref 0.6–1.1)
DIFFERENTIAL TYPE: ABNORMAL
EOSINOPHILS ABSOLUTE: 0.1 K/CU MM
EOSINOPHILS RELATIVE PERCENT: 1.9 % (ref 0–3)
FERRITIN: 20 NG/ML (ref 15–150)
FOLATE: >20 NG/ML (ref 3.1–17.5)
GFR AFRICAN AMERICAN: >60 ML/MIN/1.73M2
GFR NON-AFRICAN AMERICAN: >60 ML/MIN/1.73M2
GLUCOSE BLD-MCNC: 65 MG/DL (ref 70–99)
HCT VFR BLD CALC: 32.3 % (ref 37–47)
HEMOGLOBIN: 10.3 GM/DL (ref 12.5–16)
IRON: 181 UG/DL (ref 37–145)
LYMPHOCYTES ABSOLUTE: 1.6 K/CU MM
LYMPHOCYTES RELATIVE PERCENT: 32.9 % (ref 24–44)
MCH RBC QN AUTO: 24.7 PG (ref 27–31)
MCHC RBC AUTO-ENTMCNC: 31.9 % (ref 32–36)
MCV RBC AUTO: 77.5 FL (ref 78–100)
MONOCYTES ABSOLUTE: 0.4 K/CU MM
MONOCYTES RELATIVE PERCENT: 7.4 % (ref 0–4)
PCT TRANSFERRIN: 50 % (ref 10–44)
PDW BLD-RTO: 15.6 % (ref 11.7–14.9)
PLATELET # BLD: 299 K/CU MM (ref 140–440)
PMV BLD AUTO: 9.8 FL (ref 7.5–11.1)
POTASSIUM SERPL-SCNC: 4 MMOL/L (ref 3.5–5.1)
RBC # BLD: 4.17 M/CU MM (ref 4.2–5.4)
SEGMENTED NEUTROPHILS ABSOLUTE COUNT: 2.7 K/CU MM
SEGMENTED NEUTROPHILS RELATIVE PERCENT: 57.4 % (ref 36–66)
SODIUM BLD-SCNC: 132 MMOL/L (ref 135–145)
TOTAL IRON BINDING CAPACITY: 362 UG/DL (ref 250–450)
TOTAL PROTEIN: 6.8 GM/DL (ref 6.4–8.2)
UNSATURATED IRON BINDING CAPACITY: 181 UG/DL (ref 110–370)
VITAMIN B-12: 443 PG/ML (ref 211–911)
VITAMIN D 25-HYDROXY: 24.02 NG/ML
WBC # BLD: 4.7 K/CU MM (ref 4–10.5)

## 2021-09-29 PROCEDURE — 85025 COMPLETE CBC W/AUTO DIFF WBC: CPT

## 2021-09-29 PROCEDURE — 1036F TOBACCO NON-USER: CPT | Performed by: INTERNAL MEDICINE

## 2021-09-29 PROCEDURE — G8427 DOCREV CUR MEDS BY ELIG CLIN: HCPCS | Performed by: INTERNAL MEDICINE

## 2021-09-29 PROCEDURE — 99214 OFFICE O/P EST MOD 30 MIN: CPT | Performed by: INTERNAL MEDICINE

## 2021-09-29 PROCEDURE — 82746 ASSAY OF FOLIC ACID SERUM: CPT

## 2021-09-29 PROCEDURE — 82306 VITAMIN D 25 HYDROXY: CPT

## 2021-09-29 PROCEDURE — 99211 OFF/OP EST MAY X REQ PHY/QHP: CPT

## 2021-09-29 PROCEDURE — 36415 COLL VENOUS BLD VENIPUNCTURE: CPT

## 2021-09-29 PROCEDURE — 82607 VITAMIN B-12: CPT

## 2021-09-29 PROCEDURE — 83550 IRON BINDING TEST: CPT

## 2021-09-29 PROCEDURE — 82728 ASSAY OF FERRITIN: CPT

## 2021-09-29 PROCEDURE — 80053 COMPREHEN METABOLIC PANEL: CPT

## 2021-09-29 PROCEDURE — G8417 CALC BMI ABV UP PARAM F/U: HCPCS | Performed by: INTERNAL MEDICINE

## 2021-09-29 PROCEDURE — 83540 ASSAY OF IRON: CPT

## 2021-09-29 RX ORDER — MELATONIN
1000 DAILY
Qty: 30 TABLET | Refills: 0 | Status: SHIPPED | OUTPATIENT
Start: 2021-09-29

## 2021-09-29 RX ORDER — CHOLECALCIFEROL (VITAMIN D3) 10(400)/ML
160 DROPS ORAL 2 TIMES DAILY
Qty: 60 TABLET | Refills: 5 | Status: SHIPPED | OUTPATIENT
Start: 2021-09-29 | End: 2022-07-01

## 2021-09-29 ASSESSMENT — PATIENT HEALTH QUESTIONNAIRE - PHQ9
SUM OF ALL RESPONSES TO PHQ QUESTIONS 1-9: 0
2. FEELING DOWN, DEPRESSED OR HOPELESS: 0
SUM OF ALL RESPONSES TO PHQ QUESTIONS 1-9: 0
SUM OF ALL RESPONSES TO PHQ9 QUESTIONS 1 & 2: 0
1. LITTLE INTEREST OR PLEASURE IN DOING THINGS: 0
SUM OF ALL RESPONSES TO PHQ QUESTIONS 1-9: 0

## 2021-09-29 NOTE — PROGRESS NOTES
Patient Name: Yvette Main  Patient : 1980  Patient MRN: [id number]     Primary Oncologist: Edgardo Hatfield MD  PCP: [facility]     Date of Service: 2021      Chief Complaint:   Chief Complaint   Patient presents with    Follow-up        Active Problem list  1. Iron deficiency anemia, unspecified iron deficiency anemia type    2. Malabsorption due to intolerance, not elsewhere classified           HPI:        3/11/19: Arrived alone to the clinic. Reported that she had the gastric sleeve in 2017, lost 45 lbs. Reported that her menstrual periods are heavy at times, associated with a lot of clots, would use 8-10 pads per day for the first three days and then lighter for the next 3-4 days. No other overt bleeding. Reported fatigue. PICKIMBERLY sx. reported that she has a craving to smell alcohol pads. Intermittent chest pain. Has been evaluated and was told that she has reflux Sx. Denied any sob. No palpitattions, dizziness. Intermittent lower abdominal pain, but denied any nausea, emesis, constipation or any diarrhea. Denied any  sx. No fevr, night sweats, weight loss, lad. No vision changes, focal weakness. Reported intermittent ha, associated with smelling of alcohol pads. No dysphagia, odynophagia. STNA at acute rehab.  3/4/19: CBC with wbc 5.5, normal diff, Hb 10.1, Hct 41, mcv 75, platelets 601  CMP wnl  Ferritin 5 and iron sats 5%    3/17/2019 CT scan of the abdomen pelvis without contrast revealed nonobstructing 2 mm left renal calculus. Liver spleen pancreas and bilateral adrenal glands are normal. Gallbladder is near completely collapsed. Postsurgical changes of prior gastric sleeve. No suspicious osteolytic or osteoblastic lesions.      Received Ferriheme  CBC with wbc 3.7,anc 1800, Hb 8.8, Hct 29, mcv 69, paltelets 294, ferritin 6, iron sats 4%, b12 451, folate 16, Vitamin D 30    Received Infed 2021     US RP and abd ordered for tomorrow 21    Mammogram and ultrasound OB ordered but not yet scheduled. 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 Expereincing left lower quadrant pain radiating to the left flank. Increased frequency of urination. Intermittent dysuria. No hematuria. Reported that she continues to have menorrhagia. . Fatigue all the time. PICA sx. No chest pain, increased sob, palpitation or any dizziness. No nausea, emesis, diarrhea or any constipation. Is on SlowFe bid. She has been started on prenatals.      Review of Systems   Per interval history; otherwise 10 point ROS is negative              Vital Signs:  /65 (Site: Right Upper Arm, Position: Sitting, Cuff Size: Medium Adult)   Pulse 86   Temp 97.2 °F (36.2 °C) (Temporal)   Ht 5' 7\" (1.702 m)   Wt 225 lb (102.1 kg)   LMP 09/07/2021   SpO2 97%   BMI 35.24 kg/m²     Physical Exam:  CONSTITUTIONAL: awake, alert, cooperative, obese   EYES: No palor , no icterus   ENT: ATNC   NECK: no JVD   HEMATOLOGIC/LYMPHATIC: no cervical, supraclavicular or axillary lymphadenopathy   LUNGS:ctab CARDIOVASCULAR: s1s2 rrr no murmurs  ABDOMEN: soft ntnd bs pos  NEUROLOGIC: GI  SKIN: No rash  EXTREMITIES: no LE edema bilaterally       Labs:    Hematology:  Lab Results   Component Value Date    WBC 6.3 01/23/2019    RBC 4.49 01/23/2019    HGB 10.4 (L) 01/23/2019    HCT 35.3 (L) 01/23/2019    MCV 78.6 01/23/2019    MCH 23.2 (L) 01/23/2019    MCHC 29.5 (L) 01/23/2019    RDW 24.4 (H) 01/23/2019     01/23/2019    MPV 10.6 01/23/2019    SEGSPCT 53.0 01/23/2019    EOSRELPCT 2.4 01/23/2019    BASOPCT 0.6 01/23/2019    LYMPHOPCT 36.8 01/23/2019    MONOPCT 6.9 (H) 01/23/2019    SEGSABS 3.3 01/23/2019    EOSABS 0.2 01/23/2019    BASOSABS 0.0 01/23/2019    LYMPHSABS 2.3 01/23/2019    MONOSABS 0.4 01/23/2019    DIFFTYPE AUTOMATED DIFFERENTIAL 01/23/2019     Lab Results   Component Value Date    ESR 11 03/11/2019       Chemistry:  Lab Results   Component Value Date     05/06/2019    K 4.6 05/06/2019     05/06/2019    CO2 29 05/06/2019    BUN 9 05/06/2019    CREATININE 0.7 05/06/2019    GLUCOSE 105 (H) 05/06/2019    CALCIUM 9.6 05/06/2019    PROT 7.1 05/06/2019    LABALBU 4.4 05/06/2019    BILITOT 0.3 05/06/2019    ALKPHOS 57 05/06/2019    AST 11 (L) 05/06/2019    ALT 10 05/06/2019    LABGLOM >60 05/06/2019    GFRAA >60 05/06/2019    MG 1.9 02/09/2018    POCGLU 71 09/06/2017     Lab Results   Component Value Date     03/11/2019     No components found for: LD  Lab Results   Component Value Date    TSHHS 1.530 03/11/2019       Immunology:  Lab Results   Component Value Date    PROT 7.1 05/06/2019    SPEP INTERPRETATION - Within normal limits. RS 03/11/2019    SPEP INTERPRETATION - Within normal limits.   RS 03/11/2019    ALBUMINELP 3.9 03/11/2019    LABALPH 0.3 03/11/2019    LABALPH 0.6 03/11/2019    LABBETA 1.2 03/11/2019    GAMGLOB 1.5 03/11/2019     No results found for: KAPPAUVOL, LAMBDAUVOL, KLFLCR  No results found for: B2M    Coagulation Panel:  Lab Results   Component Value Date    DDIMER <200 05/21/2016       Anemia Panel:  Lab Results   Component Value Date    ZXGHVVIJ16 817.4 03/11/2019    FOLATE 14.8 03/11/2019       Tumor Markers:  No results found for: , CEA, , LABCA2, PSA      Imaging: Reviewed     Pathology:Reviewed     Observations:  Performance Status: ECOG 0  Depression Status: PHQ-9 Total Score: 0 (9/29/2021  1:18 PM)          Assessment & Plan: [address] [facility] female s/p gastric sleeve referred for anemia    Anemia: iron indices, microcytosis and ferritin suggestive of thi in the past. Most probably secondary to malabsorption from gastric sleeve and also note menorrhagia. No evidence of other blood loss, other nutritional def, hemolysis, monoclonal gammopathy, thyroid dys, def of microelements. Is on Slowfe and has received parenteral iron in the past. Repeat levels pending and recs pending that    Menorrhagia: Is being followed by Gyn. Holding off on any ablation as she plans to get pregnant. Has been placed on letrozole and plan to assess with labs after that    Abdominal pain: Ultrasound abd pending    HTN/Chestpain: has been prescribed BB but not taking that. Mammogram has been ordered. Recommend vitamin D supplements. Continue other medical care    Discussed the above findings and plan with her and she verbalized understanding    Discussed healthy lifestyle, regular exercise and also weight loss. Discussed the importance of being uptodate with age appropriate screening tools.      RTC  Jan 2021 or earlier if new Sx    PENELOPE

## 2021-09-29 NOTE — PROGRESS NOTES
MA Rooming Questions  Patient: Lori Shahid  MRN: [id number]    Date: 9/29/2021        1. Do you have any new issues? yes - having craving of ice, alcohol wipes and rice cakes. Patient would like iron prescribed          2. Do you need any refills on medications?    no    3. Have you had any imaging done since your last visit?   no    4. Have you been hospitalized or seen in the emergency room since your last visit here?   no    5. Did the patient have a depression screening completed today?  Yes    PHQ-9 Total Score: 0 (9/29/2021  1:18 PM)       PHQ-9 Given to (if applicable):               PHQ-9 Score (if applicable):                     [] Positive     []  Negative              Does question #9 need addressed (if applicable)                     [] Yes    []  No               Jacqui Tierney CMA

## 2021-09-29 NOTE — TELEPHONE ENCOUNTER
Per Dr. Gasper Del Angel, pt to take 2000 units of Vit D daily. Script sent to pharmacy. Also notified pt that IV iron was ordered as well.

## 2021-09-30 ENCOUNTER — HOSPITAL ENCOUNTER (OUTPATIENT)
Dept: ULTRASOUND IMAGING | Age: 41
Discharge: HOME | End: 2021-09-30
Payer: COMMERCIAL

## 2021-09-30 DIAGNOSIS — R10.9 LEFT FLANK PAIN: ICD-10-CM

## 2021-09-30 DIAGNOSIS — R10.84 ABDOMINAL PAIN, GENERALIZED: ICD-10-CM

## 2021-09-30 PROCEDURE — 76700 US EXAM ABDOM COMPLETE: CPT

## 2021-10-01 ENCOUNTER — PROCEDURE VISIT (OUTPATIENT)
Dept: CARDIOLOGY CLINIC | Age: 41
End: 2021-10-01
Payer: COMMERCIAL

## 2021-10-01 DIAGNOSIS — I10 HTN, GOAL BELOW 140/90: ICD-10-CM

## 2021-10-01 DIAGNOSIS — R07.9 CHEST PAIN, UNSPECIFIED TYPE: ICD-10-CM

## 2021-10-01 DIAGNOSIS — Z98.84 STATUS POST BARIATRIC SURGERY: ICD-10-CM

## 2021-10-01 DIAGNOSIS — R07.2 PRECORDIAL PAIN: ICD-10-CM

## 2021-10-01 DIAGNOSIS — G47.33 OSA (OBSTRUCTIVE SLEEP APNEA): ICD-10-CM

## 2021-10-01 DIAGNOSIS — D50.0 IRON DEFICIENCY ANEMIA SECONDARY TO BLOOD LOSS (CHRONIC): Primary | ICD-10-CM

## 2021-10-01 LAB
LV EF: 58 %
LVEF MODALITY: NORMAL

## 2021-10-01 PROCEDURE — 93306 TTE W/DOPPLER COMPLETE: CPT | Performed by: INTERNAL MEDICINE

## 2021-10-01 PROCEDURE — 93015 CV STRESS TEST SUPVJ I&R: CPT | Performed by: INTERNAL MEDICINE

## 2021-10-01 RX ORDER — EPINEPHRINE 1 MG/ML
0.3 INJECTION, SOLUTION, CONCENTRATE INTRAVENOUS PRN
Status: CANCELLED | OUTPATIENT
Start: 2021-10-13

## 2021-10-01 RX ORDER — SODIUM CHLORIDE 9 MG/ML
INJECTION, SOLUTION INTRAVENOUS CONTINUOUS
Status: CANCELLED | OUTPATIENT
Start: 2021-10-13

## 2021-10-01 RX ORDER — METHYLPREDNISOLONE SODIUM SUCCINATE 125 MG/2ML
125 INJECTION, POWDER, LYOPHILIZED, FOR SOLUTION INTRAMUSCULAR; INTRAVENOUS ONCE
Status: CANCELLED | OUTPATIENT
Start: 2021-10-13 | End: 2021-10-13

## 2021-10-01 RX ORDER — SODIUM CHLORIDE 0.9 % (FLUSH) 0.9 %
5-40 SYRINGE (ML) INJECTION PRN
Status: CANCELLED | OUTPATIENT
Start: 2021-10-13

## 2021-10-01 RX ORDER — DIPHENHYDRAMINE HYDROCHLORIDE 50 MG/ML
50 INJECTION INTRAMUSCULAR; INTRAVENOUS ONCE
Status: CANCELLED | OUTPATIENT
Start: 2021-10-13 | End: 2021-10-13

## 2021-10-01 RX ORDER — HEPARIN SODIUM (PORCINE) LOCK FLUSH IV SOLN 100 UNIT/ML 100 UNIT/ML
500 SOLUTION INTRAVENOUS PRN
Status: CANCELLED | OUTPATIENT
Start: 2021-10-13

## 2021-10-01 RX ORDER — SODIUM CHLORIDE 9 MG/ML
25 INJECTION, SOLUTION INTRAVENOUS PRN
Status: CANCELLED | OUTPATIENT
Start: 2021-10-13

## 2021-10-02 LAB
HPV HIGH RISK: NOT DETECTED
HPV, GENOTYPE 16: NOT DETECTED
HPV, GENOTYPE 18: NOT DETECTED

## 2021-10-04 ENCOUNTER — OFFICE VISIT (OUTPATIENT)
Dept: CARDIOLOGY CLINIC | Age: 41
End: 2021-10-04
Payer: COMMERCIAL

## 2021-10-04 VITALS
DIASTOLIC BLOOD PRESSURE: 70 MMHG | HEART RATE: 83 BPM | WEIGHT: 229.8 LBS | SYSTOLIC BLOOD PRESSURE: 118 MMHG | HEIGHT: 67 IN | BODY MASS INDEX: 36.07 KG/M2

## 2021-10-04 DIAGNOSIS — F41.9 ANXIETY: ICD-10-CM

## 2021-10-04 DIAGNOSIS — Z98.84 STATUS POST LAPAROSCOPIC SLEEVE GASTRECTOMY: ICD-10-CM

## 2021-10-04 DIAGNOSIS — R07.2 PRECORDIAL PAIN: Primary | ICD-10-CM

## 2021-10-04 DIAGNOSIS — G47.33 OSA (OBSTRUCTIVE SLEEP APNEA): ICD-10-CM

## 2021-10-04 DIAGNOSIS — D50.0 IRON DEFICIENCY ANEMIA SECONDARY TO BLOOD LOSS (CHRONIC): ICD-10-CM

## 2021-10-04 PROCEDURE — G8427 DOCREV CUR MEDS BY ELIG CLIN: HCPCS | Performed by: INTERNAL MEDICINE

## 2021-10-04 PROCEDURE — 1036F TOBACCO NON-USER: CPT | Performed by: INTERNAL MEDICINE

## 2021-10-04 PROCEDURE — G8484 FLU IMMUNIZE NO ADMIN: HCPCS | Performed by: INTERNAL MEDICINE

## 2021-10-04 PROCEDURE — 99214 OFFICE O/P EST MOD 30 MIN: CPT | Performed by: INTERNAL MEDICINE

## 2021-10-04 PROCEDURE — G8417 CALC BMI ABV UP PARAM F/U: HCPCS | Performed by: INTERNAL MEDICINE

## 2021-10-04 RX ORDER — PANTOPRAZOLE SODIUM 20 MG/1
20 TABLET, DELAYED RELEASE ORAL EVERY OTHER DAY
Qty: 90 TABLET | Refills: 0 | Status: SHIPPED | OUTPATIENT
Start: 2021-10-04 | End: 2022-03-14

## 2021-10-04 NOTE — PROGRESS NOTES
CARDIOLOGY NOTE    Chief Complaint: chest pain     HPI:   Joce Valles is a 37y.o. year old who has Past medical history as noted below. She reports she gets chest pain off and on in middle of the chest , since her last visit 1 mth she had one episodes of chest pain she has been on metoprolol but did not take protonix , she had stress test in Sept 2021 on  treadmill she completed 7 METS and 6 Mins of exercise  . She had gastric sleeve surgery in 2016 , she had normal stress test in  2017 . He also reports about a year ago she woke up in the morning with jaw pain and associated nausea and then vomited  She did not take any protonix   She denies any family history cardiac problems but younger brother had stroke when he was 15 . She denies any alcoholc or smoking       Current Outpatient Medications   Medication Sig Dispense Refill    pantoprazole (PROTONIX) 20 MG tablet Take 1 tablet by mouth every other day 90 tablet 0    ferrous sulfate dried (SLOW IRON) 160 (50 Fe) MG TBCR extended release tablet Take 160 mg by mouth 2 times daily 60 tablet 5    vitamin D3 (CHOLECALCIFEROL) 25 MCG (1000 UT) TABS tablet Take 1 tablet by mouth daily 30 tablet 0    Prenatal Vit-Fe Fumarate-FA (PRENATAL VITAMINS) 28-0.8 MG TABS Take 1 tablet by mouth daily (any prenatal vitamin covered) 30 tablet 11    letrozole (FEMARA) 2.5 MG tablet Take 2 tablets by mouth daily On cycles days 3-7 10 tablet 5    nitroGLYCERIN (NITROSTAT) 0.4 MG SL tablet Place 1 tablet under the tongue every 5 minutes as needed for Chest pain 25 tablet 3    metoprolol tartrate (LOPRESSOR) 25 MG tablet Take 1 tablet by mouth 2 times daily 60 tablet 2    Ferrous Sulfate (IRON) 142 (45 Fe) MG TBCR Take by mouth      Multiple Vitamins-Minerals (HM MULTIVITAMIN ADULT GUMMY PO) Take 2 Doses by mouth       No current facility-administered medications for this visit.        Allergies:   Penicillins    Patient History:  Past Medical History:   Diagnosis Date    Anemia     Anxiety     Arthritis     \"Left Hip\"    Diabetes ([address] Dx     Hiatal hernia     Hyperlipidemia     \"They Put Me On Lipitor Because I'm Diabetic, I Have Never Had High Cholesterol\"    Infertility, female     Sleep apnea     Uses CPAP    Teeth missing     Upper    Wears glasses     Wears partial dentures     Upper     Past Surgical History:   Procedure Laterality Date    BREAST BIOPSY Bilateral 2006    Benign    DENTAL SURGERY      Teeth Extracted In The Past    ENDOSCOPY, COLON, DIAGNOSTIC  2017    HERNIA REPAIR      SLEEVE GASTRECTOMY  2017    Robotic sleeve gastrectomy and hiatal hernia repair    TUBAL LIGATION  2005    TUBAL LIGATION      reversal     Family History   Problem Relation Age of Onset    High Blood Pressure Mother     High Cholesterol Mother     Bipolar Disorder Sister     Mental Illness Sister         Bipolar    Diabetes Brother     High Blood Pressure Sister     Stroke Brother     Asthma Daughter      Social History     Tobacco Use    Smoking status: Former Smoker     Packs/day: 0.50     Years: 13.00     Pack years: 6.50     Types: Cigarettes     Start date:      Quit date:      Years since quittin.7    Smokeless tobacco: Never Used   Substance Use Topics    Alcohol use: Yes     Comment: \"Occ. Maybe Once A Year\"        Review of Systems:   · Constitutional: No Fever or Weight Loss   · Eyes: No Decreased Vision  · ENT: No Headaches, Hearing Loss or Vertigo  · Cardiovascular: as per note above   · Respiratory: No cough or wheezing and as per note above.    · Gastrointestinal: No abdominal pain, appetite loss, blood in stools, constipation, diarrhea or heartburn  · Genitourinary: No dysuria, trouble voiding, or hematuria  · Musculoskeletal:  denies any new  joint aches , swelling  or pain   · Integumentary: No rash or pruritis  · Neurological: No TIA or stroke symptoms  · Psychiatric: No anxiety or depression  · Endocrine: No malaise, fatigue or temperature intolerance  · Hematologic/Lymphatic: No bleeding problems, blood clots or swollen lymph nodes  · Allergic/Immunologic: No nasal congestion or hives    Objective:      Physical Exam:  /70   Pulse 83   Ht 5' 7\" (1.702 m)   Wt 229 lb 12.8 oz (104.2 kg)   LMP 09/07/2021   BMI 35.99 kg/m²   Wt Readings from Last 3 Encounters:   10/04/21 229 lb 12.8 oz (104.2 kg)   09/29/21 225 lb (102.1 kg)   09/28/21 229 lb (103.9 kg)     Body mass index is 35.99 kg/m². Vitals:    10/04/21 1033   BP: 118/70   Pulse: 83        General Appearance:  No distress, conversant  Constitutional:  Well developed, Well nourished, No acute distress, Non-toxic appearance. HENT:  Normocephalic, Atraumatic, Bilateral external ears normal, Oropharynx moist, No oral exudates, Nose normal. Neck- Normal range of motion, No tenderness, Supple, No stridor,no apical-carotid delay  Eyes:  PERRL, EOMI, Conjunctiva normal, No discharge. Respiratory:  Normal breath sounds, No respiratory distress, No wheezing, No chest tenderness. ,no use of accessory muscles, NO crackles  Cardiovascular: (PMI) apex non displaced,no lifts no thrills,S1 and S2 audible, No added heart sounds, No signs of ankle edema, or volume overload, No evidence of JVD, No crackles  GI:  Bowel sounds normal, Soft, No tenderness, No masses, No gross visceromegaly   :  No costovertebral angle tenderness   Musculoskeletal:  No edema, no tenderness, no deformities.  Back- no tenderness  Integument:  Well hydrated, no rash   Lymphatic:  No lymphadenopathy noted   Neurologic:  Alert & oriented x 3, CN 2-12 normal, normal motor function, normal sensory function, no focal deficits noted   Psychiatric:  Speech and behavior appropriate       Medical decision making and Data review:  DATA:  Lab Results   Component Value Date    TROPONINT <0.010 01/23/2019     BNP:    Lab Results   Component Value Date    PROBNP 13.64 05/21/2016 PT/INR:  No results found for: PTINR  Lab Results   Component Value Date    LABA1C 5.3 02/09/2018    LABA1C 7.0 (H) 09/05/2017     Lab Results   Component Value Date    CHOL 156 10/27/2016    TRIG 168 (H) 10/27/2016    HDL 46 10/27/2016    LDLDIRECT 87 10/27/2016     Lab Results   Component Value Date    ALT 10 09/29/2021    AST 13 (L) 09/29/2021     No results for input(s): WBC, HGB, HCT, MCV, PLT in the last 72 hours. TSH: No results found for: TSH  Lab Results   Component Value Date    AST 13 (L) 09/29/2021    ALT 10 09/29/2021    BILIDIR 0.2 02/09/2018    BILITOT 0.6 09/29/2021    ALKPHOS 70 09/29/2021     Lab Results   Component Value Date    PROBNP 13.64 05/21/2016     Lab Results   Component Value Date    LABA1C 5.3 02/09/2018    LABA1C 7.0 (H) 09/05/2017     Lab Results   Component Value Date    WBC 4.7 09/29/2021    HGB 10.3 (L) 09/29/2021    HCT 32.3 (L) 09/29/2021     09/29/2021     Echo 10/1/21   Left ventricular function and size is normal, EF is estimated at 55-60%. Mild left ventricular hypertrophy. E/A reversal; indeterminate diastolic function. No regional wall motion abnormalities were detected. Mild tricuspid regurgitation; RVSP is 31 mmHg. No evidence of pericardial effusion. Stress test  10/1/21     Stress Type: Exercise        Peak HR: 153 bpm                       HR Response: Appropriate    Peak BP: 146/68 mmHg                   BP Response: Normal resting BP -    Predicted HR: 180 bpm                  appropriate response    % of predicted HR: 85                  HR BP Product: [id number]    Exercise Duration: 06:00 min           Max Exercise: 7 METS    Reason for Termination: Target heart    rate                                   Exercise Effort: Good             All labs, medications and tests reviewed by myself including data and history from outside source , patient and available family . Assessment & Plan:      1. Precordial pain    2.  Iron deficiency anemia secondary to blood loss (chronic)    3. Anxiety    4. JERMAINE (obstructive sleep apnea)    5. Status post laparoscopic sleeve gastrectomy         Precordial pain   Given history of gastric sleeve surgery and episodes of symptoms early in the morning & concern for possible reflux symptoms? For now continue  metoprolol 25 mg twice daily and use nitro as needed  Try protonix, if she continues to have symptoms will get cardiac ct     JERMAINE (obstructive sleep apnea)   Reports compliance with CPAP, echo shows LVH      Dyslipidemia :  All available lab work was reviewed. Patient was advised to repeat lab work before next visit. Necessary orders were placed , instructions given by myself       Counseled extensively and medication compliance urged. We discussed that for the  prevention of ASCVD our  goal is aggressive risk modification. Patient is encouraged to exercise if they can , educated about  brisk walk for 30 minutes  at least 3 to 4 times a week if there are no physical limitations  Various goals were discussed and questions answered. Continue current medications. Appropriate prescriptions are addressed and refills ordered. Questions answered and patient verbalizes understanding. Call for any problems, questions, or concerns. Greater than 60 % of time spent counseling besides reviewing data and images     Continue all other medications of all above medical condition listed as is. Return in about 3 months (around 1/4/2022). Please note this report has been partially produced using speech recognition software and may contain errors related to that system including errors in grammar, punctuation, and spelling, as well as words and phrases that may be inappropriate. If there are any questions or concerns please feel free to contact the dictating provider for clarification.

## 2021-10-06 PROCEDURE — 76830 TRANSVAGINAL US NON-OB: CPT | Performed by: OBSTETRICS & GYNECOLOGY

## 2021-10-07 DIAGNOSIS — N92.6 IRREGULAR MENSES: Primary | ICD-10-CM

## 2021-10-07 PROBLEM — K90.9 MALABSORPTION OF IRON: Status: ACTIVE | Noted: 2021-03-05

## 2021-10-08 PROBLEM — N92.6 IRREGULAR MENSES: Status: ACTIVE | Noted: 2021-10-08

## 2021-10-12 ENCOUNTER — HOSPITAL ENCOUNTER (OUTPATIENT)
Dept: WOMENS IMAGING | Age: 41
Discharge: HOME | End: 2021-10-12
Payer: COMMERCIAL

## 2021-10-12 DIAGNOSIS — Z12.31 SCREENING MAMMOGRAM, ENCOUNTER FOR: ICD-10-CM

## 2021-10-12 PROCEDURE — 77063 BREAST TOMOSYNTHESIS BI: CPT

## 2021-10-12 RX ORDER — NITROGLYCERIN 0.4 MG/1
TABLET SUBLINGUAL
Qty: 25 TABLET | Refills: 3 | OUTPATIENT
Start: 2021-10-12

## 2021-10-13 RX ORDER — SODIUM CHLORIDE 9 MG/ML
INJECTION, SOLUTION INTRAVENOUS CONTINUOUS
Status: CANCELLED | OUTPATIENT
Start: 2021-10-13

## 2021-10-13 RX ORDER — METHYLPREDNISOLONE SODIUM SUCCINATE 125 MG/2ML
125 INJECTION, POWDER, LYOPHILIZED, FOR SOLUTION INTRAMUSCULAR; INTRAVENOUS ONCE
Status: CANCELLED | OUTPATIENT
Start: 2021-10-13 | End: 2021-10-13

## 2021-10-13 RX ORDER — MEPERIDINE HYDROCHLORIDE 50 MG/ML
12.5 INJECTION INTRAMUSCULAR; INTRAVENOUS; SUBCUTANEOUS ONCE
Status: CANCELLED | OUTPATIENT
Start: 2021-10-13 | End: 2021-10-13

## 2021-10-13 RX ORDER — DEXAMETHASONE SODIUM PHOSPHATE 10 MG/ML
10 INJECTION INTRAMUSCULAR; INTRAVENOUS ONCE
Status: CANCELLED
Start: 2021-10-13 | End: 2021-10-13

## 2021-10-13 RX ORDER — DIPHENHYDRAMINE HYDROCHLORIDE 50 MG/ML
50 INJECTION INTRAMUSCULAR; INTRAVENOUS ONCE
Status: CANCELLED | OUTPATIENT
Start: 2021-10-13 | End: 2021-10-13

## 2021-10-13 RX ORDER — EPINEPHRINE 1 MG/ML
0.3 INJECTION, SOLUTION, CONCENTRATE INTRAVENOUS PRN
Status: CANCELLED | OUTPATIENT
Start: 2021-10-13

## 2021-10-13 RX ORDER — HEPARIN SODIUM (PORCINE) LOCK FLUSH IV SOLN 100 UNIT/ML 100 UNIT/ML
500 SOLUTION INTRAVENOUS PRN
Status: CANCELLED | OUTPATIENT
Start: 2021-10-13

## 2021-10-13 RX ORDER — SODIUM CHLORIDE 0.9 % (FLUSH) 0.9 %
5-40 SYRINGE (ML) INJECTION PRN
Status: CANCELLED | OUTPATIENT
Start: 2021-10-13

## 2021-10-13 RX ORDER — SODIUM CHLORIDE 9 MG/ML
25 INJECTION, SOLUTION INTRAVENOUS PRN
Status: CANCELLED | OUTPATIENT
Start: 2021-10-13

## 2021-10-20 ENCOUNTER — NURSE ONLY (OUTPATIENT)
Dept: OBGYN | Age: 41
End: 2021-10-20
Payer: COMMERCIAL

## 2021-10-20 DIAGNOSIS — N92.6 IRREGULAR MENSES: ICD-10-CM

## 2021-10-20 DIAGNOSIS — K90.49 MALABSORPTION DUE TO INTOLERANCE, NOT ELSEWHERE CLASSIFIED: ICD-10-CM

## 2021-10-20 DIAGNOSIS — D50.9 IRON DEFICIENCY ANEMIA, UNSPECIFIED IRON DEFICIENCY ANEMIA TYPE: ICD-10-CM

## 2021-10-20 LAB
FOLLICLE STIMULATING HORMONE: 4.3 MIU/ML
HCG QUALITATIVE: NEGATIVE
PROLACTIN: 8.9 NG/ML
TSH REFLEX FT4: 1.53 UIU/ML (ref 0.27–4.2)

## 2021-10-20 PROCEDURE — 36415 COLL VENOUS BLD VENIPUNCTURE: CPT | Performed by: OBSTETRICS & GYNECOLOGY

## 2021-10-21 ENCOUNTER — TELEPHONE (OUTPATIENT)
Dept: ONCOLOGY | Age: 41
End: 2021-10-21

## 2021-10-21 NOTE — TELEPHONE ENCOUNTER
Called patient to schedule iron infusion, patient is scheduled for Tues.  10/26 @ 0815, advised patient to notify us asap if unable to keep her appt time and if appt is No Showed, rescheduled appt may take up to 3 months to schedule, patient states understanding

## 2021-10-22 LAB
SEX HORMONE BINDING GLOBULIN: 57 NMOL/L (ref 30–135)
TESTOSTERONE FREE-NONMALE: 3.3 PG/ML (ref 1.3–9.2)
TESTOSTERONE TOTAL: 26 NG/DL (ref 20–70)

## 2021-10-26 ENCOUNTER — HOSPITAL ENCOUNTER (OUTPATIENT)
Dept: INFUSION THERAPY | Age: 41
Discharge: HOME | End: 2021-10-26
Payer: COMMERCIAL

## 2021-10-26 VITALS
RESPIRATION RATE: 16 BRPM | TEMPERATURE: 98.1 F | HEART RATE: 99 BPM | DIASTOLIC BLOOD PRESSURE: 60 MMHG | SYSTOLIC BLOOD PRESSURE: 122 MMHG | WEIGHT: 234 LBS | OXYGEN SATURATION: 98 % | BODY MASS INDEX: 36.65 KG/M2

## 2021-10-26 DIAGNOSIS — D50.0 IRON DEFICIENCY ANEMIA SECONDARY TO BLOOD LOSS (CHRONIC): Primary | ICD-10-CM

## 2021-10-26 DIAGNOSIS — K90.9 MALABSORPTION OF IRON: ICD-10-CM

## 2021-10-26 PROCEDURE — 96366 THER/PROPH/DIAG IV INF ADDON: CPT

## 2021-10-26 PROCEDURE — 96367 TX/PROPH/DG ADDL SEQ IV INF: CPT

## 2021-10-26 PROCEDURE — 6360000002 HC RX W HCPCS: Performed by: INTERNAL MEDICINE

## 2021-10-26 PROCEDURE — 96365 THER/PROPH/DIAG IV INF INIT: CPT

## 2021-10-26 PROCEDURE — 2580000003 HC RX 258: Performed by: INTERNAL MEDICINE

## 2021-10-26 RX ORDER — SODIUM CHLORIDE 9 MG/ML
INJECTION, SOLUTION INTRAVENOUS CONTINUOUS
Status: CANCELLED | OUTPATIENT
Start: 2021-11-02

## 2021-10-26 RX ORDER — SODIUM CHLORIDE 0.9 % (FLUSH) 0.9 %
5-40 SYRINGE (ML) INJECTION PRN
Status: CANCELLED | OUTPATIENT
Start: 2021-11-02

## 2021-10-26 RX ORDER — MEPERIDINE HYDROCHLORIDE 25 MG/ML
12.5 INJECTION INTRAMUSCULAR; INTRAVENOUS; SUBCUTANEOUS ONCE
Status: CANCELLED | OUTPATIENT
Start: 2021-11-02 | End: 2021-11-02

## 2021-10-26 RX ORDER — SODIUM CHLORIDE 9 MG/ML
INJECTION, SOLUTION INTRAVENOUS ONCE
Status: COMPLETED | OUTPATIENT
Start: 2021-10-26 | End: 2021-10-26

## 2021-10-26 RX ORDER — EPINEPHRINE 1 MG/ML
0.3 INJECTION, SOLUTION, CONCENTRATE INTRAVENOUS PRN
Status: CANCELLED | OUTPATIENT
Start: 2021-11-02

## 2021-10-26 RX ORDER — METHYLPREDNISOLONE SODIUM SUCCINATE 125 MG/2ML
125 INJECTION, POWDER, LYOPHILIZED, FOR SOLUTION INTRAMUSCULAR; INTRAVENOUS ONCE
Status: CANCELLED | OUTPATIENT
Start: 2021-11-02 | End: 2021-11-02

## 2021-10-26 RX ORDER — SODIUM CHLORIDE 9 MG/ML
25 INJECTION, SOLUTION INTRAVENOUS PRN
Status: CANCELLED | OUTPATIENT
Start: 2021-11-02

## 2021-10-26 RX ORDER — SODIUM CHLORIDE 0.9 % (FLUSH) 0.9 %
5-40 SYRINGE (ML) INJECTION PRN
Status: DISCONTINUED | OUTPATIENT
Start: 2021-10-26 | End: 2021-10-27 | Stop reason: HOSPADM

## 2021-10-26 RX ORDER — DIPHENHYDRAMINE HYDROCHLORIDE 50 MG/ML
50 INJECTION INTRAMUSCULAR; INTRAVENOUS ONCE
Status: CANCELLED | OUTPATIENT
Start: 2021-11-02 | End: 2021-11-02

## 2021-10-26 RX ORDER — HEPARIN SODIUM (PORCINE) LOCK FLUSH IV SOLN 100 UNIT/ML 100 UNIT/ML
500 SOLUTION INTRAVENOUS PRN
Status: CANCELLED | OUTPATIENT
Start: 2021-11-02

## 2021-10-26 RX ADMIN — SODIUM CHLORIDE 1000 MG: 9 INJECTION, SOLUTION INTRAVENOUS at 09:00

## 2021-10-26 RX ADMIN — SODIUM CHLORIDE: 9 INJECTION, SOLUTION INTRAVENOUS at 08:31

## 2021-10-26 RX ADMIN — DEXAMETHASONE SODIUM PHOSPHATE 10 MG: 10 INJECTION INTRAMUSCULAR; INTRAVENOUS at 08:32

## 2021-10-26 NOTE — PROGRESS NOTES
Pt. Here for second infed infusion. Peripheral IV started in left forearm, pt. Tolerated well. Pt. Reported she can tell when iron is low due to she craves rice cakes and the smell of alcohol wipes. Pt. Denies any dizziness or shortness breath. Pre-medication given. Treatment administered without incident.

## 2021-11-18 ENCOUNTER — OFFICE VISIT (OUTPATIENT)
Dept: OBGYN | Age: 41
End: 2021-11-18
Payer: COMMERCIAL

## 2021-11-18 VITALS
SYSTOLIC BLOOD PRESSURE: 108 MMHG | HEIGHT: 67 IN | WEIGHT: 231 LBS | DIASTOLIC BLOOD PRESSURE: 74 MMHG | BODY MASS INDEX: 36.26 KG/M2

## 2021-11-18 DIAGNOSIS — N83.201 RIGHT OVARIAN CYST: Primary | ICD-10-CM

## 2021-11-18 DIAGNOSIS — N92.6 IRREGULAR MENSES: ICD-10-CM

## 2021-11-18 DIAGNOSIS — R93.89 THICKENED ENDOMETRIUM: ICD-10-CM

## 2021-11-18 DIAGNOSIS — N83.202 LEFT OVARIAN CYST: ICD-10-CM

## 2021-11-18 PROCEDURE — 99214 OFFICE O/P EST MOD 30 MIN: CPT | Performed by: OBSTETRICS & GYNECOLOGY

## 2021-11-18 ASSESSMENT — ENCOUNTER SYMPTOMS
RESPIRATORY NEGATIVE: 1
ALLERGIC/IMMUNOLOGIC NEGATIVE: 1
GASTROINTESTINAL NEGATIVE: 1
EYES NEGATIVE: 1

## 2021-11-18 NOTE — PROGRESS NOTES
21    Denton Calvillo  1980    Chief Complaint   Patient presents with    Follow-up     pt here to follow up on Femara, labs and u/s due to irregular menses and trying to conceive. LMP 10/31/21. ns    Menstrual Problem        Denton Calvillo is a 37 y.o. female who presents today for evaluation of AUB. Past Medical History:   Diagnosis Date    Anemia     Anxiety     Arthritis     \"Left Hip\"    Diabetes ([address] Dx     Hiatal hernia     Hyperlipidemia     \"They Put Me On Lipitor Because I'm Diabetic, I Have Never Had High Cholesterol\"    Infertility, female     Sleep apnea     Uses CPAP    Teeth missing     Upper    Wears glasses     Wears partial dentures     Upper       Past Surgical History:   Procedure Laterality Date    BREAST BIOPSY Bilateral     Benign    DENTAL SURGERY      Teeth Extracted In The Past    ENDOSCOPY, COLON, DIAGNOSTIC      HERNIA REPAIR      SLEEVE GASTRECTOMY  2017    Robotic sleeve gastrectomy and hiatal hernia repair    TUBAL LIGATION  2005    TUBAL LIGATION      reversal       Social History     Tobacco Use    Smoking status: Former Smoker     Packs/day: 0.50     Years: 13.00     Pack years: 6.50     Types: Cigarettes     Start date:      Quit date:      Years since quittin.8    Smokeless tobacco: Never Used   Vaping Use    Vaping Use: Never used   Substance Use Topics    Alcohol use: Yes     Comment: \"Occ.  Maybe Once A Year\"    Drug use: No       Family History   Problem Relation Age of Onset    High Blood Pressure Mother     High Cholesterol Mother     Bipolar Disorder Sister     Mental Illness Sister         Bipolar    Diabetes Brother     High Blood Pressure Sister     Stroke Brother     Asthma Daughter        Current Outpatient Medications   Medication Sig Dispense Refill    pantoprazole (PROTONIX) 20 MG tablet Take 1 tablet by mouth every other day 90 tablet 0    vitamin D3 (CHOLECALCIFEROL) 25 MCG (1000 UT) TABS tablet Take 1 tablet by mouth daily 30 tablet 0    letrozole (FEMARA) 2.5 MG tablet Take 2 tablets by mouth daily On cycles days 3-7 10 tablet 5    nitroGLYCERIN (NITROSTAT) 0.4 MG SL tablet Place 1 tablet under the tongue every 5 minutes as needed for Chest pain 25 tablet 3    metoprolol tartrate (LOPRESSOR) 25 MG tablet Take 1 tablet by mouth 2 times daily 60 tablet 2    Ferrous Sulfate (IRON) 142 (45 Fe) MG TBCR Take by mouth      Multiple Vitamins-Minerals (HM MULTIVITAMIN ADULT GUMMY PO) Take 2 Doses by mouth      ferrous sulfate dried (SLOW IRON) 160 (50 Fe) MG TBCR extended release tablet Take 160 mg by mouth 2 times daily 60 tablet 5    Prenatal Vit-Fe Fumarate-FA (PRENATAL VITAMINS) 28-0.8 MG TABS Take 1 tablet by mouth daily (any prenatal vitamin covered) 30 tablet 11     No current facility-administered medications for this visit. Allergies   Allergen Reactions    Penicillins Shortness Of Breath             There is no immunization history on file for this patient. Review of Systems   Constitutional: Negative. Eyes: Negative. Respiratory: Negative. Cardiovascular: Negative. Gastrointestinal: Negative. Endocrine: Negative. Genitourinary: Positive for menstrual problem. Musculoskeletal: Negative. Skin: Negative. Allergic/Immunologic: Negative. Neurological: Negative. Hematological: Negative. Psychiatric/Behavioral: Negative. /74   Ht 5' 7\" (1.702 m)   Wt 231 lb (104.8 kg)   LMP 10/31/2021   BMI 36.18 kg/m²     Physical Exam  Constitutional:       General: She is not in acute distress. Appearance: Normal appearance. She is not ill-appearing. HENT:      Head: Normocephalic. Nose: Nose normal.   Eyes:      General: No scleral icterus. Right eye: No discharge. Left eye: No discharge. Cardiovascular:      Pulses: Normal pulses.    Pulmonary:      Effort: Pulmonary effort is normal.   Abdominal: General: Abdomen is flat. There is no distension. Palpations: Abdomen is soft. There is no mass. Tenderness: There is no abdominal tenderness. Hernia: No hernia is present. Musculoskeletal:         General: Normal range of motion. Cervical back: Normal range of motion and neck supple. No rigidity. Skin:     General: Skin is warm and dry. Neurological:      General: No focal deficit present. Mental Status: She is alert and oriented to person, place, and time. Psychiatric:         Mood and Affect: Mood normal.         Behavior: Behavior normal.         No results found for this visit on 11/18/21.  10/20/2021 1054 10/20/2021 2215 Prolactin [[id number]]    Blood    Component Value Units   Prolactin 8.9  ng/mL           10/20/2021 1054 10/20/2021 2228 TSH WITH REFLEX TO FT4 [[id number]]    Blood    Component Value Units   TSH Reflex FT4 1.53 uIU/mL           10/20/2021 1054 10/22/2021 1749 Testosterone, free, total [[id number]]    Blood    Component Value Units   Testosterone 26 ng/dL   Sex Hormone Binding 57 nmol/L   Testosterone, Free 3.3  pg/mL           10/20/2021 1054 36/37/1918 1654 Follicle Stimulating Hormone [[id number]]    Blood    Component Value Units   FSH 4.3  mIU/mL           10/20/2021 1054 10/20/2021 2206 HCG Qualitative, Serum [[id number]]    Blood    Component Value   hCG Qual Negative               ASSESSMENT AND PLAN   Diagnosis Orders   1. Right ovarian cyst     2. Thickened endometrium  US NON OB TRANSVAGINAL   3. Irregular menses  Progesterone    Progesterone   4. Left ovarian cyst  US NON OB TRANSVAGINAL     discussed age related risk of aneuploidy  Discussed ohss and twins    Continue letrozole 5mg days 3-7 with day 21 and day 23 progesterone    Need to repeat us to ensure cysts are resolving and to ensure no endometrial polys  Return in about 6 weeks (around 12/30/2021).     Anil Rogers MD

## 2022-02-06 ENCOUNTER — HOSPITAL ENCOUNTER (EMERGENCY)
Age: 42
Discharge: HOME | End: 2022-02-06
Payer: COMMERCIAL

## 2022-02-06 ENCOUNTER — APPOINTMENT (OUTPATIENT)
Dept: GENERAL RADIOLOGY | Age: 42
End: 2022-02-06
Payer: COMMERCIAL

## 2022-02-06 VITALS
DIASTOLIC BLOOD PRESSURE: 77 MMHG | RESPIRATION RATE: 17 BRPM | BODY MASS INDEX: 37.67 KG/M2 | OXYGEN SATURATION: 98 % | TEMPERATURE: 98.3 F | HEART RATE: 100 BPM | SYSTOLIC BLOOD PRESSURE: 119 MMHG | HEIGHT: 67 IN | WEIGHT: 240 LBS

## 2022-02-06 DIAGNOSIS — M25.562 ACUTE PAIN OF LEFT KNEE: Primary | ICD-10-CM

## 2022-02-06 PROCEDURE — 73564 X-RAY EXAM KNEE 4 OR MORE: CPT

## 2022-02-06 PROCEDURE — 6370000000 HC RX 637 (ALT 250 FOR IP): Performed by: PHYSICIAN ASSISTANT

## 2022-02-06 PROCEDURE — 99283 EMERGENCY DEPT VISIT LOW MDM: CPT

## 2022-02-06 RX ORDER — ACETAMINOPHEN 500 MG
1000 TABLET ORAL ONCE
Status: COMPLETED | OUTPATIENT
Start: 2022-02-06 | End: 2022-02-06

## 2022-02-06 RX ADMIN — ACETAMINOPHEN 1000 MG: 500 TABLET ORAL at 08:40

## 2022-02-06 ASSESSMENT — PAIN SCALES - GENERAL
PAINLEVEL_OUTOF10: 8
PAINLEVEL_OUTOF10: 8

## 2022-02-06 ASSESSMENT — PAIN DESCRIPTION - ORIENTATION: ORIENTATION: LEFT

## 2022-02-06 ASSESSMENT — PAIN DESCRIPTION - LOCATION: LOCATION: KNEE

## 2022-02-06 ASSESSMENT — PAIN DESCRIPTION - FREQUENCY: FREQUENCY: CONTINUOUS

## 2022-02-06 ASSESSMENT — PAIN DESCRIPTION - DESCRIPTORS: DESCRIPTORS: ACHING;SHARP

## 2022-02-06 NOTE — ED NOTES
The patient presents to the emergency department alert and oriented with a complaint of left knee pain since last night after slipping on water at the store. The patient denies any other injuries. The patient placed on the monitor with vital signs taken. Assessment as follows; Skin is pink, warm and dry. Ice applied to the knee.       Jacqueline Davis RN  02/06/22 5941

## 2022-02-06 NOTE — ED PROVIDER NOTES
EMERGENCY DEPARTMENT ENCOUNTER      PCP: Ashutosh Soliz MD    CHIEF COMPLAINT    Chief Complaint   Patient presents with    Fall     fall last night , injury to left knee     This patient was not evaluated by the attending physician. I have independently evaluated this patient. HPI    Mayo Bustillo is a 40 y.o. female who presents with Left knee pain. Onset was last night. The context is patient states she slipped at the store and fell injuring left knee. Pain is localized to medial. The pain severity is moderate. The patient has no associated other injuries. The pain is aggravated by ambulation. REVIEW OF SYSTEMS    General: Denies fever or chills  Cardiac: Denies chest pain  Pulmonary: Denies shortness of breath  GI: Denies abdominal pain, vomiting, or diarrhea  : No dysuria or hematuria    Denies any other muscles skeletal injuries, including chest wall and back.     All other review of systems are negative  See HPI and nursing notes for additional information     PAST MEDICAL & SURGICAL HISTORY    Past Medical History:   Diagnosis Date    Anemia     Anxiety     Arthritis     \"Left Hip\"    Diabetes ([facility] [address] Dx 2008    Hiatal hernia     Hyperlipidemia     \"They Put Me On Lipitor Because I'm Diabetic, I Have Never Had High Cholesterol\"    Infertility, female     Sleep apnea     Uses CPAP    Teeth missing     Upper    Wears glasses     Wears partial dentures     Upper     Past Surgical History:   Procedure Laterality Date    BREAST BIOPSY Bilateral 2006    Benign    DENTAL SURGERY      Teeth Extracted In The Past    ENDOSCOPY, COLON, DIAGNOSTIC  2017    HERNIA REPAIR      SLEEVE GASTRECTOMY  09/05/2017    Robotic sleeve gastrectomy and hiatal hernia repair    TUBAL LIGATION  2005    TUBAL LIGATION      reversal       CURRENT MEDICATIONS    Current Outpatient Rx   Medication Sig Dispense Refill    pantoprazole (PROTONIX) 20 MG tablet Take 1 tablet by mouth every other day 90 tablet 0    ferrous sulfate dried (SLOW IRON) 160 (50 Fe) MG TBCR extended release tablet Take 160 mg by mouth 2 times daily 60 tablet 5    vitamin D3 (CHOLECALCIFEROL) 25 MCG (1000 UT) TABS tablet Take 1 tablet by mouth daily 30 tablet 0    Prenatal Vit-Fe Fumarate-FA (PRENATAL VITAMINS) 28-0.8 MG TABS Take 1 tablet by mouth daily (any prenatal vitamin covered) 30 tablet 11    letrozole (FEMARA) 2.5 MG tablet Take 2 tablets by mouth daily On cycles days 3-7 10 tablet 5    nitroGLYCERIN (NITROSTAT) 0.4 MG SL tablet Place 1 tablet under the tongue every 5 minutes as needed for Chest pain 25 tablet 3    metoprolol tartrate (LOPRESSOR) 25 MG tablet Take 1 tablet by mouth 2 times daily 60 tablet 2    Ferrous Sulfate (IRON) 142 (45 Fe) MG TBCR Take by mouth      Multiple Vitamins-Minerals (HM MULTIVITAMIN ADULT GUMMY PO) Take 2 Doses by mouth         ALLERGIES    Allergies   Allergen Reactions    Penicillins Shortness Of Breath       SOCIAL & FAMILY HISTORY    Social History     Socioeconomic History    Marital status: Single     Spouse name: None    Number of children: None    Years of education: None    Highest education level: None   Occupational History    None   Tobacco Use    Smoking status: Former Smoker     Packs/day: 0.50     Years: 13.00     Pack years: 6.50     Types: Cigarettes     Start date:      Quit date:      Years since quittin.1    Smokeless tobacco: Never Used   Vaping Use    Vaping Use: Never used   Substance and Sexual Activity    Alcohol use: Not Currently     Comment: \"Occ.  Maybe Once A Year\"    Drug use: No    Sexual activity: Not Currently   Other Topics Concern    None   Social History Narrative    None     Social Determinants of Health     Financial Resource Strain:     Difficulty of Paying Living Expenses: Not on file   Food Insecurity:     Worried About Running Out of Food in the Last Year: Not on file    Taj of Food in the Last Year: Not on file Transportation Needs:     Lack of Transportation (Medical): Not on file    Lack of Transportation (Non-Medical): Not on file   Physical Activity:     Days of Exercise per Week: Not on file    Minutes of Exercise per Session: Not on file   Stress:     Feeling of Stress : Not on file   Social Connections:     Frequency of Communication with Friends and Family: Not on file    Frequency of Social Gatherings with Friends and Family: Not on file    Attends [employer] Services: Not on file    Active Member of [address] [place] [address] [id number] or Organizations: Not on file    Attends Club or Organization Meetings: Not on file    Marital Status: Not on file   Intimate Partner Violence:     Fear of Current or Ex-Partner: Not on file    Emotionally Abused: Not on file    Physically Abused: Not on file    Sexually Abused: Not on file   Housing Stability:     Unable to Pay for Housing in the Last Year: Not on file    Number of [place] in the Last Year: Not on file    Unstable Housing in the Last Year: Not on file     Family History   Problem Relation Age of Onset    High Blood Pressure Mother     High Cholesterol Mother     Bipolar Disorder Sister     Mental Illness Sister         Bipolar    Diabetes Brother     High Blood Pressure Sister     Stroke Brother     Asthma Daughter            PHYSICAL EXAM    VITAL SIGNS: /77   Pulse 100   Temp 98.3 °F (36.8 °C) (Oral)   Resp 17   Ht 5' 7\" (1.702 m)   Wt 240 lb (108.9 kg)   LMP 01/21/2021   SpO2 98%   BMI 37.59 kg/m²   Constitutional:  Well developed, Appears comfortable    Musculoskeletal:    Left knee exam:      -Inspection:  No obvious defects or deformities, skin intact   - Palpation: No redness, or warmth     No effusion     Mild medial and anterior knee tenderness.    -ROM:  Extension - Has full extension (Extensor mechanism intact)    Flexion - Mildly limited due pain   -Provocative tests: Negative posterior and anterior drawer test. No varus / valgus laxity. No tenderness to palpation of lower leg, or range of motion deficit of the ipsilateral hip and ankle  Vascular: Distal pulses intact on affected side. Integument:  Well hydrated, no rash   Neurologic:  Awake and alert, normal flow of speech. Distal sensation, motor intact. Psychiatric: Cooperative, pleasant affect        RADIOLOGY/PROCEDURES    XR KNEE LEFT (MIN 4 VIEWS)   Preliminary Result   No acute bony abnormality identified in the left knee. Small volume knee   joint fluid. ED COURSE & MEDICAL DECISION MAKING      Patient presents as above. Patient provided Tylenol for pain. Left knee x-ray shows no acute osseous abnormality. I discussed imaging results with patient today. I discussed possibility of internal derangement. Patient provided Ace wrap. Patient declines crutches. Recommend rest, ice, compression elevation. Recommend follow-up with orthopedist in 1 week if no improvement in pain. Clinical  IMPRESSION    1. Acute pain of left knee        Diagnosis and plan discussed in detail with patient who understands and agrees. Patient agrees to return emergency department if symptoms worsen or any new symptoms develop. Comment: Please note this report has been produced using speech recognition software and may contain errors related to that system including errors in grammar, punctuation, and spelling, as well as words and phrases that may be inappropriate. If there are any questions or concerns please feel free to contact the dictating provider for clarification.         Tanesha Galeano PA-C  02/06/22 8617

## 2022-02-06 NOTE — ED NOTES
Discharge instructions reviewed with patient. The patient voiced understanding of the discharge paperwork and received no prescriptions. The patient left alert and oriented with no questions or concerns.      Jose Vargas RN  02/06/22 6991

## 2022-02-06 NOTE — ED NOTES
Ace wrap applied. Circulation is reassessed in affected extremity & the capillary refill is < 3 seconds distal to injury. The client denies any increased pain, new pain, or numbness after the splint/wrap was applied.      Karina Yang RN  02/06/22 1754

## 2022-02-06 NOTE — LETTER
[facility] Emergency Department  [address] [id number]  Phone: [phone]  Fax: [phone]               February 6, 2022    Patient: Arjun Nolan   YOB: 1980   Date of Visit: 2/6/2022       To Whom It May Concern:    Alexandro Rebolledo was seen and treated in our emergency department on 2/6/2022. She may return to work on 2/7/22.       Sincerely,       Seb Brooks PA-C         Signature:__________________________________

## 2022-03-07 ENCOUNTER — CLINICAL DOCUMENTATION (OUTPATIENT)
Dept: ONCOLOGY | Age: 42
End: 2022-03-07

## 2022-03-08 ENCOUNTER — HOSPITAL ENCOUNTER (OUTPATIENT)
Dept: INFUSION THERAPY | Age: 42
Discharge: HOME | End: 2022-03-08
Payer: COMMERCIAL

## 2022-03-08 DIAGNOSIS — D50.9 IRON DEFICIENCY ANEMIA, UNSPECIFIED IRON DEFICIENCY ANEMIA TYPE: Primary | ICD-10-CM

## 2022-03-08 DIAGNOSIS — D50.9 IRON DEFICIENCY ANEMIA, UNSPECIFIED IRON DEFICIENCY ANEMIA TYPE: ICD-10-CM

## 2022-03-08 DIAGNOSIS — K90.9 MALABSORPTION OF IRON: ICD-10-CM

## 2022-03-08 LAB
ALBUMIN SERPL-MCNC: 3.9 GM/DL (ref 3.4–5)
ALP BLD-CCNC: 61 IU/L (ref 40–129)
ALT SERPL-CCNC: 11 U/L (ref 10–40)
ANION GAP SERPL CALCULATED.3IONS-SCNC: 9 MMOL/L (ref 4–16)
AST SERPL-CCNC: 13 IU/L (ref 15–37)
BASOPHILS ABSOLUTE: 0 K/CU MM
BASOPHILS RELATIVE PERCENT: 0.5 % (ref 0–1)
BILIRUB SERPL-MCNC: 0.3 MG/DL (ref 0–1)
BUN BLDV-MCNC: 9 MG/DL (ref 6–23)
CALCIUM SERPL-MCNC: 8.6 MG/DL (ref 8.3–10.6)
CHLORIDE BLD-SCNC: 105 MMOL/L (ref 99–110)
CO2: 26 MMOL/L (ref 21–32)
CREAT SERPL-MCNC: 0.6 MG/DL (ref 0.6–1.1)
DIFFERENTIAL TYPE: ABNORMAL
EOSINOPHILS ABSOLUTE: 0.2 K/CU MM
EOSINOPHILS RELATIVE PERCENT: 3.2 % (ref 0–3)
FERRITIN: 8 NG/ML (ref 15–150)
GFR AFRICAN AMERICAN: >60 ML/MIN/1.73M2
GFR NON-AFRICAN AMERICAN: >60 ML/MIN/1.73M2
GLUCOSE BLD-MCNC: 186 MG/DL (ref 70–99)
HCT VFR BLD CALC: 34.5 % (ref 37–47)
HEMOGLOBIN: 10.9 GM/DL (ref 12.5–16)
IRON: 18 UG/DL (ref 37–145)
LYMPHOCYTES ABSOLUTE: 2.7 K/CU MM
LYMPHOCYTES RELATIVE PERCENT: 41.3 % (ref 24–44)
MCH RBC QN AUTO: 27 PG (ref 27–31)
MCHC RBC AUTO-ENTMCNC: 31.6 % (ref 32–36)
MCV RBC AUTO: 85.4 FL (ref 78–100)
MONOCYTES ABSOLUTE: 0.6 K/CU MM
MONOCYTES RELATIVE PERCENT: 9 % (ref 0–4)
PCT TRANSFERRIN: 5 % (ref 10–44)
PDW BLD-RTO: 13 % (ref 11.7–14.9)
PLATELET # BLD: 291 K/CU MM (ref 140–440)
PMV BLD AUTO: 9.6 FL (ref 7.5–11.1)
POTASSIUM SERPL-SCNC: 4.1 MMOL/L (ref 3.5–5.1)
RBC # BLD: 4.04 M/CU MM (ref 4.2–5.4)
SEGMENTED NEUTROPHILS ABSOLUTE COUNT: 3 K/CU MM
SEGMENTED NEUTROPHILS RELATIVE PERCENT: 46 % (ref 36–66)
SODIUM BLD-SCNC: 140 MMOL/L (ref 135–145)
TOTAL IRON BINDING CAPACITY: 365 UG/DL (ref 250–450)
TOTAL PROTEIN: 6.4 GM/DL (ref 6.4–8.2)
UNSATURATED IRON BINDING CAPACITY: 347 UG/DL (ref 110–370)
WBC # BLD: 6.6 K/CU MM (ref 4–10.5)

## 2022-03-08 PROCEDURE — 85025 COMPLETE CBC W/AUTO DIFF WBC: CPT

## 2022-03-08 PROCEDURE — 83550 IRON BINDING TEST: CPT

## 2022-03-08 PROCEDURE — 80053 COMPREHEN METABOLIC PANEL: CPT

## 2022-03-08 PROCEDURE — 36415 COLL VENOUS BLD VENIPUNCTURE: CPT

## 2022-03-08 PROCEDURE — 82728 ASSAY OF FERRITIN: CPT

## 2022-03-08 PROCEDURE — 83540 ASSAY OF IRON: CPT

## 2022-03-14 ENCOUNTER — OFFICE VISIT (OUTPATIENT)
Dept: ONCOLOGY | Age: 42
End: 2022-03-14
Payer: COMMERCIAL

## 2022-03-14 ENCOUNTER — HOSPITAL ENCOUNTER (OUTPATIENT)
Dept: INFUSION THERAPY | Age: 42
Discharge: HOME | End: 2022-03-14

## 2022-03-14 VITALS
WEIGHT: 240 LBS | BODY MASS INDEX: 37.67 KG/M2 | HEIGHT: 67 IN | HEART RATE: 92 BPM | TEMPERATURE: 98.3 F | OXYGEN SATURATION: 99 % | RESPIRATION RATE: 18 BRPM | DIASTOLIC BLOOD PRESSURE: 72 MMHG | SYSTOLIC BLOOD PRESSURE: 119 MMHG

## 2022-03-14 DIAGNOSIS — D50.9 IRON DEFICIENCY ANEMIA, UNSPECIFIED IRON DEFICIENCY ANEMIA TYPE: Primary | ICD-10-CM

## 2022-03-14 DIAGNOSIS — K90.9 MALABSORPTION OF IRON: ICD-10-CM

## 2022-03-14 PROCEDURE — 1036F TOBACCO NON-USER: CPT | Performed by: INTERNAL MEDICINE

## 2022-03-14 PROCEDURE — G8484 FLU IMMUNIZE NO ADMIN: HCPCS | Performed by: INTERNAL MEDICINE

## 2022-03-14 PROCEDURE — G8417 CALC BMI ABV UP PARAM F/U: HCPCS | Performed by: INTERNAL MEDICINE

## 2022-03-14 PROCEDURE — 99214 OFFICE O/P EST MOD 30 MIN: CPT | Performed by: INTERNAL MEDICINE

## 2022-03-14 PROCEDURE — G8427 DOCREV CUR MEDS BY ELIG CLIN: HCPCS | Performed by: INTERNAL MEDICINE

## 2022-03-14 RX ORDER — EPINEPHRINE 1 MG/ML
0.3 INJECTION, SOLUTION, CONCENTRATE INTRAVENOUS PRN
Status: CANCELLED | OUTPATIENT
Start: 2022-03-21

## 2022-03-14 RX ORDER — SODIUM CHLORIDE 9 MG/ML
INJECTION, SOLUTION INTRAVENOUS CONTINUOUS
Status: CANCELLED | OUTPATIENT
Start: 2022-03-21

## 2022-03-14 RX ORDER — HEPARIN SODIUM (PORCINE) LOCK FLUSH IV SOLN 100 UNIT/ML 100 UNIT/ML
500 SOLUTION INTRAVENOUS PRN
Status: CANCELLED | OUTPATIENT
Start: 2022-03-21

## 2022-03-14 RX ORDER — ACETAMINOPHEN 325 MG/1
650 TABLET ORAL
Status: CANCELLED | OUTPATIENT
Start: 2022-03-21

## 2022-03-14 RX ORDER — SODIUM CHLORIDE 0.9 % (FLUSH) 0.9 %
5-40 SYRINGE (ML) INJECTION PRN
Status: CANCELLED | OUTPATIENT
Start: 2022-03-21

## 2022-03-14 RX ORDER — SODIUM CHLORIDE 9 MG/ML
25 INJECTION, SOLUTION INTRAVENOUS PRN
Status: CANCELLED | OUTPATIENT
Start: 2022-03-21

## 2022-03-14 RX ORDER — ONDANSETRON 2 MG/ML
8 INJECTION INTRAMUSCULAR; INTRAVENOUS
Status: CANCELLED | OUTPATIENT
Start: 2022-03-21

## 2022-03-14 RX ORDER — MEPERIDINE HYDROCHLORIDE 50 MG/ML
12.5 INJECTION INTRAMUSCULAR; INTRAVENOUS; SUBCUTANEOUS PRN
Status: CANCELLED | OUTPATIENT
Start: 2022-03-21

## 2022-03-14 RX ORDER — DIPHENHYDRAMINE HYDROCHLORIDE 50 MG/ML
50 INJECTION INTRAMUSCULAR; INTRAVENOUS
Status: CANCELLED | OUTPATIENT
Start: 2022-03-21

## 2022-03-14 RX ORDER — ALBUTEROL SULFATE 90 UG/1
4 AEROSOL, METERED RESPIRATORY (INHALATION) PRN
Status: CANCELLED | OUTPATIENT
Start: 2022-03-21

## 2022-03-14 ASSESSMENT — PATIENT HEALTH QUESTIONNAIRE - PHQ9
SUM OF ALL RESPONSES TO PHQ QUESTIONS 1-9: 0
2. FEELING DOWN, DEPRESSED OR HOPELESS: 0
SUM OF ALL RESPONSES TO PHQ QUESTIONS 1-9: 0
1. LITTLE INTEREST OR PLEASURE IN DOING THINGS: 0
SUM OF ALL RESPONSES TO PHQ QUESTIONS 1-9: 0
SUM OF ALL RESPONSES TO PHQ9 QUESTIONS 1 & 2: 0
SUM OF ALL RESPONSES TO PHQ QUESTIONS 1-9: 0

## 2022-03-14 NOTE — PROGRESS NOTES
MA Rooming Questions  Patient: Bird Lee  MRN: [id number]    Date: 3/14/2022        1. Do you have any new issues?   no         2. Do you need any refills on medications?    no    3. Have you had any imaging done since your last visit?   no    4. Have you been hospitalized or seen in the emergency room since your last visit here?   no    5. Did the patient have a depression screening completed today?  Yes    PHQ-9 Total Score: 0 (3/14/2022  1:41 PM)       PHQ-9 Given to (if applicable):               PHQ-9 Score (if applicable):                     [] Positive     [x]  Negative              Does question #9 need addressed (if applicable)                     [] Yes    []  No               Allie Campos MA

## 2022-03-14 NOTE — PROGRESS NOTES
Patient Name: Carrie Mike  Patient : 1980  Patient MRN: [id number]     Primary Oncologist: Bnadar Graham MD  PCP: Brenda Yang     Date of Service: 3/14/2022      Chief Complaint:   Chief Complaint   Patient presents with    Follow-up        Active Problem list  1. Iron deficiency anemia, unspecified iron deficiency anemia type    2. Malabsorption of iron           HPI:        3/11/19: Arrived alone to the clinic. Reported that she had the gastric sleeve in 2017, lost 45 lbs. Reported that her menstrual periods are heavy at times, associated with a lot of clots, would use 8-10 pads per day for the first three days and then lighter for the next 3-4 days. No other overt bleeding. Reported fatigue. PICA sx. reported that she has a craving to smell alcohol pads. Intermittent chest pain. Has been evaluated and was told that she has reflux Sx. Denied any sob. No palpitattions, dizziness. Intermittent lower abdominal pain, but denied any nausea, emesis, constipation or any diarrhea. Denied any  sx. No fevr, night sweats, weight loss, lad. No vision changes, focal weakness. Reported intermittent ha, associated with smelling of alcohol pads. No dysphagia, odynophagia. STNA at acute rehab.  3/4/19: CBC with wbc 5.5, normal diff, Hb 10.1, Hct 41, mcv 75, platelets 254  CMP wnl  Ferritin 5 and iron sats 5%    3/17/2019 CT scan of the abdomen pelvis without contrast revealed nonobstructing 2 mm left renal calculus. Liver spleen pancreas and bilateral adrenal glands are normal. Gallbladder is near completely collapsed. Postsurgical changes of prior gastric sleeve. No suspicious osteolytic or osteoblastic lesions.      Received Ferriheme  CBC with wbc 3.7,anc 1800, Hb 8.8, Hct 29, mcv 69, paltelets 294, ferritin 6, iron sats 4%, b12 451, folate 16, Vitamin D 30    Received Infed 2021     US RP and abd ordered for tomorrow 21 Ultrasound:  Normal uterus with an endometrial thickness of 2.68 cm, and normal    ovaries.   On October 6, 2021 the endometrial thickness was 6.2 mm and the    left ovarian cyst seen on this ultrasound in October are now resolved.     The full ultrasound report is located in the media tab for further    details. October 2021 Mammogram:  No mammographic evidence of malignancy.       BIRADS:   BIRADS - CATEGORY 1       Negative, no evidence of malignancy.  Normal interval follow-up is   recommended in 12 months.       OVERALL ASSESSMENT - NEGATIVE       A letter of notification will be sent to the patient regarding the results. 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 Having heavy menstrual bleeding. No other overt bleeding. Feeling more tired and starting to have craving for ice chips. No fever. No chest pain, increased sob, palpitations or any dizziness. No abdominal pain. No LE edema. Is on SlowFe and also prenatal care.        Review of Systems   Per interval history; otherwise 10 point ROS is negative              Vital Signs:  /72 (Site: Right Upper Arm, Position: Sitting, Cuff Size: Large Adult)   Pulse 92   Temp 98.3 °F (36.8 °C) (Infrared)   Resp 18   Ht 5' 7\" (1.702 m)   Wt 240 lb (108.9 kg)   SpO2 99%   BMI 37.59 kg/m²     Physical Exam:  CONSTITUTIONAL: awake, alert, cooperative, obese   EYES: No palor , no icterus   ENT: ATNC   NECK: no JVD   HEMATOLOGIC/LYMPHATIC: no cervical, supraclavicular or axillary lymphadenopathy   LUNGS:ctab   CARDIOVASCULAR: s1s2 rrr no murmurs  ABDOMEN: soft ntnd bs pos  NEUROLOGIC: GI  SKIN: No rash  EXTREMITIES: no LE edema bilaterally       Labs:    Hematology:  Lab Results   Component Value Date    WBC 6.6 03/08/2022    RBC 4.04 (L) 03/08/2022    HGB 10.9 (L) 03/08/2022    HCT 34.5 (L) 03/08/2022    MCV 85.4 03/08/2022    MCH 27.0 03/08/2022    MCHC 31.6 (L) 03/08/2022    RDW 13.0 03/08/2022     03/08/2022    MPV 9.6 03/08/2022    SEGSPCT 46.0 03/08/2022    EOSRELPCT 3.2 (H) 03/08/2022    BASOPCT 0.5 03/08/2022    LYMPHOPCT 41.3 03/08/2022    MONOPCT 9.0 (H) 03/08/2022    SEGSABS 3.0 03/08/2022    EOSABS 0.2 03/08/2022    BASOSABS 0.0 03/08/2022    LYMPHSABS 2.7 03/08/2022    MONOSABS 0.6 03/08/2022    DIFFTYPE AUTOMATED DIFFERENTIAL 03/08/2022     Lab Results   Component Value Date    ESR 11 03/11/2019       Chemistry:  Lab Results   Component Value Date     03/08/2022    K 4.1 03/08/2022     03/08/2022    CO2 26 03/08/2022    BUN 9 03/08/2022    CREATININE 0.6 03/08/2022    GLUCOSE 186 (H) 03/08/2022    CALCIUM 8.6 03/08/2022    PROT 6.4 03/08/2022    LABALBU 3.9 03/08/2022    BILITOT 0.3 03/08/2022    ALKPHOS 61 03/08/2022    AST 13 (L) 03/08/2022    ALT 11 03/08/2022    LABGLOM >60 03/08/2022    GFRAA >60 03/08/2022    MG 1.9 02/09/2018    POCGLU 71 09/06/2017     Lab Results   Component Value Date     03/11/2019     No components found for: LD  Lab Results   Component Value Date    TSHHS 1.530 03/11/2019       Immunology:  Lab Results   Component Value Date    PROT 6.4 03/08/2022    SPEP INTERPRETATION - Within normal limits. RS 03/11/2019    SPEP INTERPRETATION - Within normal limits. RS 03/11/2019    ALBUMINELP 3.9 03/11/2019    LABALPH 0.3 03/11/2019    LABALPH 0.6 03/11/2019    LABBETA 1.2 03/11/2019    GAMGLOB 1.5 03/11/2019     No results found for: Golden Del, KLFLCR  No results found for: B2M    Coagulation Panel:  Lab Results   Component Value Date    DDIMER <200 05/21/2016       Anemia Panel:  Lab Results   Component Value Date    RGULYVNI14 443.0 09/29/2021    FOLATE >20.0 (H) 09/29/2021       Tumor Markers:  No results found for: , CEA, , LABCA2, PSA      Imaging: Reviewed     Pathology:Reviewed     Observations:  Performance Status: ECOG 0  Depression Status: PHQ-9 Total Score: 0 (3/14/2022  1:41 PM)          Assessment & Plan: Vergia Favors female s/p gastric sleeve referred for anemia    Anemia: iron indices, microcytosis and ferritin suggestive of thi in the past. Most probably secondary to malabsorption from gastric sleeve and also note menorrhagia. No evidence of other blood loss, other nutritional def, hemolysis, monoclonal gammopathy, thyroid dys, def of microelements. Has received parenteral iron in 2021,  is on SlowFe but not compliant. Ferritin 8, recommend parenteral iron again and recommend that she is compliant with oral iron. Menorrhagia: Is being followed by Gyn. Holding off on any ablation as she plans to get pregnant. Was on letrozole but has been discontinued    Recommend vitamin D supplements. Continue other medical care    Discussed the above findings and plan with her and she verbalized understanding    Discussed healthy lifestyle, regular exercise and also weight loss. Discussed the importance of being uptodate with age appropriate screening tools.    Mammogram October 2021 normal    RTC  June 2022  or earlier if new Sx    PENELOPE

## 2022-03-29 ENCOUNTER — TELEPHONE (OUTPATIENT)
Dept: ONCOLOGY | Age: 42
End: 2022-03-29

## 2022-03-29 NOTE — TELEPHONE ENCOUNTER
Called patient to schedule iron infusion, patient is scheduled for Fri. 04/08, 04/15, and 04/22 @ 1045, advised patient to notify us asap if unable to keep their appt time and if appt is No Showed or No Call, rescheduled appt may take up to 3 months to schedule, also no visitors at time of treatment, patient states understanding

## 2022-04-08 ENCOUNTER — HOSPITAL ENCOUNTER (OUTPATIENT)
Dept: INFUSION THERAPY | Age: 42
Discharge: HOME | End: 2022-04-08
Payer: COMMERCIAL

## 2022-04-08 VITALS
HEIGHT: 67 IN | HEART RATE: 91 BPM | RESPIRATION RATE: 16 BRPM | OXYGEN SATURATION: 97 % | TEMPERATURE: 96.3 F | SYSTOLIC BLOOD PRESSURE: 112 MMHG | DIASTOLIC BLOOD PRESSURE: 73 MMHG | BODY MASS INDEX: 37.67 KG/M2 | WEIGHT: 240 LBS

## 2022-04-08 DIAGNOSIS — D50.9 IRON DEFICIENCY ANEMIA, UNSPECIFIED IRON DEFICIENCY ANEMIA TYPE: ICD-10-CM

## 2022-04-08 DIAGNOSIS — K90.9 MALABSORPTION OF IRON: ICD-10-CM

## 2022-04-08 DIAGNOSIS — D50.0 IRON DEFICIENCY ANEMIA SECONDARY TO BLOOD LOSS (CHRONIC): Primary | ICD-10-CM

## 2022-04-08 PROCEDURE — 96366 THER/PROPH/DIAG IV INF ADDON: CPT

## 2022-04-08 PROCEDURE — 96367 TX/PROPH/DG ADDL SEQ IV INF: CPT

## 2022-04-08 PROCEDURE — 96375 TX/PRO/DX INJ NEW DRUG ADDON: CPT

## 2022-04-08 PROCEDURE — 96365 THER/PROPH/DIAG IV INF INIT: CPT

## 2022-04-08 PROCEDURE — 6360000002 HC RX W HCPCS: Performed by: INTERNAL MEDICINE

## 2022-04-08 PROCEDURE — 6360000002 HC RX W HCPCS

## 2022-04-08 PROCEDURE — 2580000003 HC RX 258: Performed by: INTERNAL MEDICINE

## 2022-04-08 RX ORDER — HEPARIN SODIUM (PORCINE) LOCK FLUSH IV SOLN 100 UNIT/ML 100 UNIT/ML
500 SOLUTION INTRAVENOUS PRN
Status: CANCELLED | OUTPATIENT
Start: 2022-04-15

## 2022-04-08 RX ORDER — EPINEPHRINE 1 MG/ML
0.3 INJECTION, SOLUTION, CONCENTRATE INTRAVENOUS PRN
Status: CANCELLED | OUTPATIENT
Start: 2022-04-15

## 2022-04-08 RX ORDER — DIPHENHYDRAMINE HYDROCHLORIDE 50 MG/ML
50 INJECTION INTRAMUSCULAR; INTRAVENOUS
Status: CANCELLED | OUTPATIENT
Start: 2022-04-15

## 2022-04-08 RX ORDER — SODIUM CHLORIDE 9 MG/ML
INJECTION, SOLUTION INTRAVENOUS CONTINUOUS
Status: CANCELLED | OUTPATIENT
Start: 2022-04-15

## 2022-04-08 RX ORDER — MEPERIDINE HYDROCHLORIDE 25 MG/ML
12.5 INJECTION INTRAMUSCULAR; INTRAVENOUS; SUBCUTANEOUS PRN
Status: CANCELLED | OUTPATIENT
Start: 2022-04-15

## 2022-04-08 RX ORDER — DIPHENHYDRAMINE HYDROCHLORIDE 50 MG/ML
INJECTION INTRAMUSCULAR; INTRAVENOUS
Status: COMPLETED
Start: 2022-04-08 | End: 2022-04-08

## 2022-04-08 RX ORDER — DEXAMETHASONE SODIUM PHOSPHATE 4 MG/ML
4 INJECTION, SOLUTION INTRA-ARTICULAR; INTRALESIONAL; INTRAMUSCULAR; INTRAVENOUS; SOFT TISSUE ONCE
Status: COMPLETED | OUTPATIENT
Start: 2022-04-08 | End: 2022-04-08

## 2022-04-08 RX ORDER — ONDANSETRON 2 MG/ML
8 INJECTION INTRAMUSCULAR; INTRAVENOUS
Status: CANCELLED | OUTPATIENT
Start: 2022-04-15

## 2022-04-08 RX ORDER — DIPHENHYDRAMINE HYDROCHLORIDE 50 MG/ML
25 INJECTION INTRAMUSCULAR; INTRAVENOUS ONCE
Status: COMPLETED | OUTPATIENT
Start: 2022-04-08 | End: 2022-04-08

## 2022-04-08 RX ORDER — ALBUTEROL SULFATE 90 UG/1
4 AEROSOL, METERED RESPIRATORY (INHALATION) PRN
Status: CANCELLED | OUTPATIENT
Start: 2022-04-15

## 2022-04-08 RX ORDER — DEXAMETHASONE SODIUM PHOSPHATE 4 MG/ML
INJECTION, SOLUTION INTRA-ARTICULAR; INTRALESIONAL; INTRAMUSCULAR; INTRAVENOUS; SOFT TISSUE
Status: COMPLETED
Start: 2022-04-08 | End: 2022-04-08

## 2022-04-08 RX ORDER — SODIUM CHLORIDE 0.9 % (FLUSH) 0.9 %
5-40 SYRINGE (ML) INJECTION PRN
Status: CANCELLED | OUTPATIENT
Start: 2022-04-15

## 2022-04-08 RX ORDER — SODIUM CHLORIDE 9 MG/ML
INJECTION, SOLUTION INTRAVENOUS ONCE
Status: COMPLETED | OUTPATIENT
Start: 2022-04-08 | End: 2022-04-08

## 2022-04-08 RX ORDER — ACETAMINOPHEN 325 MG/1
650 TABLET ORAL
Status: CANCELLED | OUTPATIENT
Start: 2022-04-15

## 2022-04-08 RX ORDER — SODIUM CHLORIDE 9 MG/ML
25 INJECTION, SOLUTION INTRAVENOUS PRN
Status: CANCELLED | OUTPATIENT
Start: 2022-04-15

## 2022-04-08 RX ADMIN — DEXAMETHASONE SODIUM PHOSPHATE: 4 INJECTION, SOLUTION INTRA-ARTICULAR; INTRALESIONAL; INTRAMUSCULAR; INTRAVENOUS; SOFT TISSUE at 14:01

## 2022-04-08 RX ADMIN — DIPHENHYDRAMINE HYDROCHLORIDE 25 MG: 50 INJECTION INTRAMUSCULAR; INTRAVENOUS at 14:02

## 2022-04-08 RX ADMIN — DEXAMETHASONE SODIUM PHOSPHATE: 4 INJECTION, SOLUTION INTRAMUSCULAR; INTRAVENOUS at 14:01

## 2022-04-08 RX ADMIN — SODIUM CHLORIDE: 9 INJECTION, SOLUTION INTRAVENOUS at 11:13

## 2022-04-08 RX ADMIN — IRON SUCROSE 300 MG: 20 INJECTION, SOLUTION INTRAVENOUS at 11:14

## 2022-04-08 NOTE — PROGRESS NOTES
Pt. Here for venofer infusion. Peripheral IV started in right arm, good blood return noted. Treatment administered without incident. Pt. Remained for 30 minute observation period. Discharge AVS given. Pt. Called in shortly after leaving stating she thinks she was having a reaction. Pt. Stated she had hives and itching on the back of her neck. Nurse asked pt. To return. Pt. Arrived at 13:40, Peripheral IV started in right ac on second attempt. Dr. Moi Gant at chairside. Pt. Given dexamethasone, benadryl and fluids. Pt. Remained until 15:00. Pt. Denies any other s/s of reaction.

## 2022-06-16 ENCOUNTER — OFFICE VISIT (OUTPATIENT)
Dept: ONCOLOGY | Age: 42
End: 2022-06-16
Payer: COMMERCIAL

## 2022-06-16 ENCOUNTER — HOSPITAL ENCOUNTER (OUTPATIENT)
Dept: INFUSION THERAPY | Age: 42
Discharge: HOME | End: 2022-06-16
Payer: COMMERCIAL

## 2022-06-16 VITALS
RESPIRATION RATE: 16 BRPM | WEIGHT: 245 LBS | SYSTOLIC BLOOD PRESSURE: 152 MMHG | HEART RATE: 95 BPM | HEIGHT: 67 IN | OXYGEN SATURATION: 97 % | DIASTOLIC BLOOD PRESSURE: 85 MMHG | TEMPERATURE: 97 F | BODY MASS INDEX: 38.45 KG/M2

## 2022-06-16 DIAGNOSIS — K90.9 MALABSORPTION OF IRON: ICD-10-CM

## 2022-06-16 DIAGNOSIS — D50.9 IRON DEFICIENCY ANEMIA, UNSPECIFIED IRON DEFICIENCY ANEMIA TYPE: Primary | ICD-10-CM

## 2022-06-16 DIAGNOSIS — D50.9 IRON DEFICIENCY ANEMIA, UNSPECIFIED IRON DEFICIENCY ANEMIA TYPE: ICD-10-CM

## 2022-06-16 LAB
ALBUMIN SERPL-MCNC: 4.1 GM/DL (ref 3.4–5)
ALP BLD-CCNC: 58 IU/L (ref 40–129)
ALT SERPL-CCNC: 8 U/L (ref 10–40)
ANION GAP SERPL CALCULATED.3IONS-SCNC: 10 MMOL/L (ref 4–16)
AST SERPL-CCNC: 12 IU/L (ref 15–37)
BASOPHILS ABSOLUTE: 0 K/CU MM
BASOPHILS RELATIVE PERCENT: 0.5 % (ref 0–1)
BILIRUB SERPL-MCNC: 0.5 MG/DL (ref 0–1)
BUN BLDV-MCNC: 15 MG/DL (ref 6–23)
CALCIUM SERPL-MCNC: 9.1 MG/DL (ref 8.3–10.6)
CHLORIDE BLD-SCNC: 106 MMOL/L (ref 99–110)
CO2: 25 MMOL/L (ref 21–32)
CREAT SERPL-MCNC: 0.5 MG/DL (ref 0.6–1.1)
DIFFERENTIAL TYPE: ABNORMAL
EOSINOPHILS ABSOLUTE: 0.2 K/CU MM
EOSINOPHILS RELATIVE PERCENT: 2.7 % (ref 0–3)
FERRITIN: 5 NG/ML (ref 15–150)
FOLATE: >20 NG/ML (ref 3.1–17.5)
GFR AFRICAN AMERICAN: >60 ML/MIN/1.73M2
GFR NON-AFRICAN AMERICAN: >60 ML/MIN/1.73M2
GLUCOSE BLD-MCNC: 84 MG/DL (ref 70–99)
HCT VFR BLD CALC: 29.9 % (ref 37–47)
HEMOGLOBIN: 9.4 GM/DL (ref 12.5–16)
IRON: 16 UG/DL (ref 37–145)
LYMPHOCYTES ABSOLUTE: 2.1 K/CU MM
LYMPHOCYTES RELATIVE PERCENT: 33.8 % (ref 24–44)
MCH RBC QN AUTO: 24.5 PG (ref 27–31)
MCHC RBC AUTO-ENTMCNC: 31.4 % (ref 32–36)
MCV RBC AUTO: 77.9 FL (ref 78–100)
MONOCYTES ABSOLUTE: 0.3 K/CU MM
MONOCYTES RELATIVE PERCENT: 5.3 % (ref 0–4)
PCT TRANSFERRIN: 4 % (ref 10–44)
PDW BLD-RTO: 16.5 % (ref 11.7–14.9)
PLATELET # BLD: 308 K/CU MM (ref 140–440)
PMV BLD AUTO: 9.1 FL (ref 7.5–11.1)
POTASSIUM SERPL-SCNC: 4 MMOL/L (ref 3.5–5.1)
RBC # BLD: 3.84 M/CU MM (ref 4.2–5.4)
SEGMENTED NEUTROPHILS ABSOLUTE COUNT: 3.6 K/CU MM
SEGMENTED NEUTROPHILS RELATIVE PERCENT: 57.7 % (ref 36–66)
SODIUM BLD-SCNC: 141 MMOL/L (ref 135–145)
TOTAL IRON BINDING CAPACITY: 368 UG/DL (ref 250–450)
TOTAL PROTEIN: 6.9 GM/DL (ref 6.4–8.2)
UNSATURATED IRON BINDING CAPACITY: 352 UG/DL (ref 110–370)
VITAMIN B-12: 475.7 PG/ML (ref 211–911)
WBC # BLD: 6.2 K/CU MM (ref 4–10.5)

## 2022-06-16 PROCEDURE — 82607 VITAMIN B-12: CPT

## 2022-06-16 PROCEDURE — G8427 DOCREV CUR MEDS BY ELIG CLIN: HCPCS | Performed by: INTERNAL MEDICINE

## 2022-06-16 PROCEDURE — 80053 COMPREHEN METABOLIC PANEL: CPT

## 2022-06-16 PROCEDURE — G8417 CALC BMI ABV UP PARAM F/U: HCPCS | Performed by: INTERNAL MEDICINE

## 2022-06-16 PROCEDURE — 85025 COMPLETE CBC W/AUTO DIFF WBC: CPT

## 2022-06-16 PROCEDURE — 83550 IRON BINDING TEST: CPT

## 2022-06-16 PROCEDURE — 82746 ASSAY OF FOLIC ACID SERUM: CPT

## 2022-06-16 PROCEDURE — 82728 ASSAY OF FERRITIN: CPT

## 2022-06-16 PROCEDURE — 1036F TOBACCO NON-USER: CPT | Performed by: INTERNAL MEDICINE

## 2022-06-16 PROCEDURE — 36415 COLL VENOUS BLD VENIPUNCTURE: CPT

## 2022-06-16 PROCEDURE — 99214 OFFICE O/P EST MOD 30 MIN: CPT | Performed by: INTERNAL MEDICINE

## 2022-06-16 PROCEDURE — 83540 ASSAY OF IRON: CPT

## 2022-06-16 ASSESSMENT — PATIENT HEALTH QUESTIONNAIRE - PHQ9
SUM OF ALL RESPONSES TO PHQ9 QUESTIONS 1 & 2: 0
SUM OF ALL RESPONSES TO PHQ QUESTIONS 1-9: 0
SUM OF ALL RESPONSES TO PHQ QUESTIONS 1-9: 0
1. LITTLE INTEREST OR PLEASURE IN DOING THINGS: 0
2. FEELING DOWN, DEPRESSED OR HOPELESS: 0
SUM OF ALL RESPONSES TO PHQ QUESTIONS 1-9: 0
SUM OF ALL RESPONSES TO PHQ QUESTIONS 1-9: 0

## 2022-06-16 NOTE — PROGRESS NOTES
Patient Name: Venice Blanco  Patient : 1980  Patient MRN: [id number]     Primary Oncologist: Peter England MD  PCP: [facility]     Date of Service: 2022      Chief Complaint:   Chief Complaint   Patient presents with    Discuss Labs        Active Problem list  1. Iron deficiency anemia, unspecified iron deficiency anemia type    2. Malabsorption of iron           HPI:        3/11/19: Arrived alone to the clinic. Reported that she had the gastric sleeve in 2017, lost 45 lbs. Reported that her menstrual periods are heavy at times, associated with a lot of clots, would use 8-10 pads per day for the first three days and then lighter for the next 3-4 days. No other overt bleeding. Reported fatigue. PICKIMBERLY sx. reported that she has a craving to smell alcohol pads. Intermittent chest pain. Has been evaluated and was told that she has reflux Sx. Denied any sob. No palpitattions, dizziness. Intermittent lower abdominal pain, but denied any nausea, emesis, constipation or any diarrhea. Denied any  sx. No fevr, night sweats, weight loss, lad. No vision changes, focal weakness. Reported intermittent ha, associated with smelling of alcohol pads. No dysphagia, odynophagia. STNA at acute rehab.  3/4/19: CBC with wbc 5.5, normal diff, Hb 10.1, Hct 41, mcv 75, platelets 362  CMP wnl  Ferritin 5 and iron sats 5%    3/17/2019 CT scan of the abdomen pelvis without contrast revealed nonobstructing 2 mm left renal calculus. Liver spleen pancreas and bilateral adrenal glands are normal. Gallbladder is near completely collapsed. Postsurgical changes of prior gastric sleeve. No suspicious osteolytic or osteoblastic lesions.      Received Ferriheme  CBC with wbc 3.7,anc 1800, Hb 8.8, Hct 29, mcv 69, paltelets 294, ferritin 6, iron sats 4%, b12 451, folate 16, Vitamin D 30    Received Infed 2021     US RP and abd ordered for tomorrow 21 Ultrasound:  Normal uterus with an endometrial thickness of 2.68 cm, and normal    ovaries.   On October 6, 2021 the endometrial thickness was 6.2 mm and the    left ovarian cyst seen on this ultrasound in October are now resolved.     The full ultrasound report is located in the media tab for further    details. October 2021 Mammogram:  No mammographic evidence of malignancy.       BIRADS:   BIRADS - CATEGORY 1       Negative, no evidence of malignancy.  Normal interval follow-up is   recommended in 12 months.       OVERALL ASSESSMENT - NEGATIVE       A letter of notification will be sent to the patient regarding the results. 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 Having heavy menstrual bleeding. No other overt bleeding. Feeling more tired and starting to have craving for ice chips. No fever. No chest pain, increased sob, palpitations or any dizziness. No abdominal pain. No LE edema. Is on SlowFe bid and also prenatal care.        Review of Systems   Per interval history; otherwise 10 point ROS is negative              Vital Signs:  BP (!) 152/85 (Site: Right Lower Arm, Position: Sitting, Cuff Size: Medium Adult)   Pulse 95   Temp 97 °F (36.1 °C) (Infrared)   Resp 16   Ht 5' 7\" (1.702 m)   Wt 245 lb (111.1 kg)   SpO2 97%   BMI 38.37 kg/m²     Physical Exam:  CONSTITUTIONAL: awake, alert, cooperative, obese   EYES: No palor , no icterus   ENT: ATNC   NECK: no JVD   HEMATOLOGIC/LYMPHATIC: no cervical, supraclavicular or axillary lymphadenopathy   LUNGS:ctab   CARDIOVASCULAR: s1s2 rrr no murmurs  ABDOMEN: soft ntnd bs pos  NEUROLOGIC: GI  SKIN: No rash  EXTREMITIES: no LE edema bilaterally       Labs:    Hematology:  Lab Results   Component Value Date    WBC 6.6 03/08/2022    RBC 4.04 (L) 03/08/2022    HGB 10.9 (L) 03/08/2022    HCT 34.5 (L) 03/08/2022    MCV 85.4 03/08/2022    MCH 27.0 03/08/2022    MCHC 31.6 (L) 03/08/2022    RDW 13.0 03/08/2022     03/08/2022    MPV 9.6 03/08/2022    SEGSPCT 46.0 03/08/2022    EOSRELPCT 3.2 (H) 03/08/2022    BASOPCT 0.5 03/08/2022    LYMPHOPCT 41.3 03/08/2022    MONOPCT 9.0 (H) 03/08/2022    SEGSABS 3.0 03/08/2022    EOSABS 0.2 03/08/2022    BASOSABS 0.0 03/08/2022    LYMPHSABS 2.7 03/08/2022    MONOSABS 0.6 03/08/2022    DIFFTYPE AUTOMATED DIFFERENTIAL 03/08/2022     Lab Results   Component Value Date    ESR 11 03/11/2019       Chemistry:  Lab Results   Component Value Date     03/08/2022    K 4.1 03/08/2022     03/08/2022    CO2 26 03/08/2022    BUN 9 03/08/2022    CREATININE 0.6 03/08/2022    GLUCOSE 186 (H) 03/08/2022    CALCIUM 8.6 03/08/2022    PROT 6.4 03/08/2022    LABALBU 3.9 03/08/2022    BILITOT 0.3 03/08/2022    ALKPHOS 61 03/08/2022    AST 13 (L) 03/08/2022    ALT 11 03/08/2022    LABGLOM >60 03/08/2022    GFRAA >60 03/08/2022    MG 1.9 02/09/2018    POCGLU 71 09/06/2017     Lab Results   Component Value Date     03/11/2019     No components found for: LD  Lab Results   Component Value Date    TSHHS 1.530 03/11/2019       Immunology:  Lab Results   Component Value Date PROT 6.4 03/08/2022    SPEP INTERPRETATION - Within normal limits. RS 03/11/2019    SPEP INTERPRETATION - Within normal limits. RS 03/11/2019    ALBUMINELP 3.9 03/11/2019    LABALPH 0.3 03/11/2019    LABALPH 0.6 03/11/2019    LABBETA 1.2 03/11/2019    GAMGLOB 1.5 03/11/2019     No results found for: Kayla Hearing, KLFLCR  No results found for: B2M    Coagulation Panel:  Lab Results   Component Value Date    DDIMER <200 05/21/2016       Anemia Panel:  Lab Results   Component Value Date    IYRGYRVB68 443.0 09/29/2021    FOLATE >20.0 (H) 09/29/2021       Tumor Markers:  No results found for: , CEA, , LABCA2, PSA      Imaging: Reviewed     Pathology:Reviewed     Observations:  Performance Status: ECOG 0  Depression Status: PHQ-9 Total Score: 0 (6/16/2022  2:49 PM)          Assessment & Plan: Viral Orozco female s/p gastric sleeve referred for anemia    Anemia: iron indices, microcytosis and ferritin were  suggestive of thi in the past. Most probably secondary to malabsorption from gastric sleeve and also note menorrhagia. No evidence of other blood loss, other nutritional def, hemolysis, monoclonal gammopathy, thyroid dys, def of microelements. Has received parenteral iron in 2021,  is on SlowFe bid     Menorrhagia: Is being followed by Gyn. Holding off on any ablation as she plans to get pregnant. Was on letrozole but has been discontinued    HTN: Recommend that she maintains a log and discusses with PCP if remains elevated, recommend low salt diet, weight loss if possible and exercise. Recommend vitamin D supplements. Continue other medical care    Discussed the above findings and plan with her and she verbalized understanding    Discussed healthy lifestyle, regular exercise and also weight loss. Discussed the importance of being uptodate with age appropriate screening tools.    Mammogram October 2021 normal    RTC  Sep 2022  or earlier if new Sx    PENELOPE

## 2022-06-16 NOTE — PROGRESS NOTES
MA Rooming Questions  Patient: Derik Mccoy  MRN: [id number]    Date: 6/16/2022        1. Do you have any new issues?   no         2. Do you need any refills on medications?    no    3. Have you had any imaging done since your last visit?   no    4. Have you been hospitalized or seen in the emergency room since your last visit here?   no    5. Did the patient have a depression screening completed today?  Yes    PHQ-9 Total Score: 0 (6/16/2022  2:49 PM)       PHQ-9 Given to (if applicable):               PHQ-9 Score (if applicable):                     [] Positive     []  Negative              Does question #9 need addressed (if applicable)                     [] Yes    []  No               Willene Brunner, CMA

## 2022-06-23 DIAGNOSIS — D50.9 IRON DEFICIENCY ANEMIA, UNSPECIFIED IRON DEFICIENCY ANEMIA TYPE: ICD-10-CM

## 2022-06-23 DIAGNOSIS — K90.9 MALABSORPTION OF IRON: Primary | ICD-10-CM

## 2022-06-23 RX ORDER — ACETAMINOPHEN 325 MG/1
650 TABLET ORAL
Status: CANCELLED | OUTPATIENT
Start: 2022-07-07

## 2022-06-23 RX ORDER — ALBUTEROL SULFATE 90 UG/1
4 AEROSOL, METERED RESPIRATORY (INHALATION) PRN
Status: CANCELLED | OUTPATIENT
Start: 2022-07-07

## 2022-06-23 RX ORDER — SODIUM CHLORIDE 9 MG/ML
INJECTION, SOLUTION INTRAVENOUS CONTINUOUS
Status: CANCELLED | OUTPATIENT
Start: 2022-07-07

## 2022-06-23 RX ORDER — ONDANSETRON 2 MG/ML
8 INJECTION INTRAMUSCULAR; INTRAVENOUS
Status: CANCELLED | OUTPATIENT
Start: 2022-07-07

## 2022-06-23 RX ORDER — SODIUM CHLORIDE 0.9 % (FLUSH) 0.9 %
5-40 SYRINGE (ML) INJECTION PRN
Status: CANCELLED | OUTPATIENT
Start: 2022-07-07

## 2022-06-23 RX ORDER — SODIUM CHLORIDE 9 MG/ML
5-250 INJECTION, SOLUTION INTRAVENOUS PRN
Status: CANCELLED | OUTPATIENT
Start: 2022-07-07

## 2022-06-23 RX ORDER — FAMOTIDINE 10 MG/ML
20 INJECTION, SOLUTION INTRAVENOUS
Status: CANCELLED | OUTPATIENT
Start: 2022-07-07

## 2022-06-23 RX ORDER — DIPHENHYDRAMINE HYDROCHLORIDE 50 MG/ML
50 INJECTION INTRAMUSCULAR; INTRAVENOUS
Status: CANCELLED | OUTPATIENT
Start: 2022-07-07

## 2022-06-23 RX ORDER — HEPARIN SODIUM (PORCINE) LOCK FLUSH IV SOLN 100 UNIT/ML 100 UNIT/ML
500 SOLUTION INTRAVENOUS PRN
Status: CANCELLED | OUTPATIENT
Start: 2022-07-07

## 2022-06-23 RX ORDER — MEPERIDINE HYDROCHLORIDE 50 MG/ML
12.5 INJECTION INTRAMUSCULAR; INTRAVENOUS; SUBCUTANEOUS PRN
Status: CANCELLED | OUTPATIENT
Start: 2022-07-07

## 2022-06-23 RX ORDER — EPINEPHRINE 1 MG/ML
0.3 INJECTION, SOLUTION, CONCENTRATE INTRAVENOUS PRN
Status: CANCELLED | OUTPATIENT
Start: 2022-07-07

## 2022-06-24 ENCOUNTER — CLINICAL DOCUMENTATION (OUTPATIENT)
Dept: ONCOLOGY | Age: 42
End: 2022-06-24

## 2022-06-24 NOTE — PROGRESS NOTES
Patient needs iron infusion for iron deficiency anemia d/t chronic blood loss. Order for venofer 300 mg weekly x3 and NN referral placed per physician. Therapy plan added. Called patient at [phone] to notify, but no answer. VM left. Explained that once medication approved by insurance, patient will be scheduled for OP infusion and notified of appointment time. This RN direct number provided should any questions present.

## 2022-06-30 ENCOUNTER — OFFICE VISIT (OUTPATIENT)
Dept: OBGYN | Age: 42
End: 2022-06-30
Payer: COMMERCIAL

## 2022-06-30 VITALS — SYSTOLIC BLOOD PRESSURE: 128 MMHG | DIASTOLIC BLOOD PRESSURE: 76 MMHG | BODY MASS INDEX: 39 KG/M2 | WEIGHT: 249 LBS

## 2022-06-30 DIAGNOSIS — R10.2 PELVIC PAIN: Primary | ICD-10-CM

## 2022-06-30 DIAGNOSIS — E66.9 OBESITY (BMI 30-39.9): ICD-10-CM

## 2022-06-30 DIAGNOSIS — M54.50 LOW BACK PAIN, UNSPECIFIED BACK PAIN LATERALITY, UNSPECIFIED CHRONICITY, UNSPECIFIED WHETHER SCIATICA PRESENT: ICD-10-CM

## 2022-06-30 LAB
BASOPHILS ABSOLUTE: 0 K/UL (ref 0–0.2)
BASOPHILS RELATIVE PERCENT: 0.7 %
EOSINOPHILS ABSOLUTE: 0.1 K/UL (ref 0–0.6)
EOSINOPHILS RELATIVE PERCENT: 2.5 %
HCG QUALITATIVE: NEGATIVE
HCT VFR BLD CALC: 32 % (ref 36–48)
HEMOGLOBIN: 10.1 G/DL (ref 12–16)
LYMPHOCYTES ABSOLUTE: 1.8 K/UL (ref 1–5.1)
LYMPHOCYTES RELATIVE PERCENT: 31 %
MCH RBC QN AUTO: 23.3 PG (ref 26–34)
MCHC RBC AUTO-ENTMCNC: 31.6 G/DL (ref 31–36)
MCV RBC AUTO: 73.7 FL (ref 80–100)
MONOCYTES ABSOLUTE: 0.4 K/UL (ref 0–1.3)
MONOCYTES RELATIVE PERCENT: 6.4 %
NEUTROPHILS ABSOLUTE: 3.5 K/UL (ref 1.7–7.7)
NEUTROPHILS RELATIVE PERCENT: 59.4 %
PDW BLD-RTO: 16.9 % (ref 12.4–15.4)
PLATELET # BLD: 290 K/UL (ref 135–450)
PMV BLD AUTO: 8.8 FL (ref 5–10.5)
RBC # BLD: 4.34 M/UL (ref 4–5.2)
WBC # BLD: 5.9 K/UL (ref 4–11)

## 2022-06-30 PROCEDURE — 99213 OFFICE O/P EST LOW 20 MIN: CPT

## 2022-06-30 PROCEDURE — 1036F TOBACCO NON-USER: CPT

## 2022-06-30 PROCEDURE — G8427 DOCREV CUR MEDS BY ELIG CLIN: HCPCS

## 2022-06-30 PROCEDURE — G8417 CALC BMI ABV UP PARAM F/U: HCPCS

## 2022-06-30 PROCEDURE — 36415 COLL VENOUS BLD VENIPUNCTURE: CPT

## 2022-06-30 ASSESSMENT — ENCOUNTER SYMPTOMS
GASTROINTESTINAL NEGATIVE: 1
RESPIRATORY NEGATIVE: 1
ABDOMINAL PAIN: 0

## 2022-06-30 NOTE — PROGRESS NOTES
22    Maurilio Galan  1980    Chief Complaint   Patient presents with    Pelvic Pain     c/o pelvic pain that began 3 wks ago. Pt states the pain radiates into her lower back. c/o left shoulder pain. Pt concerned that they are connected. Maurilio Galan is a 40 y.o. female who presents today for evaluation of pelvic pain for about three weeks. Pt reports sharp, stabbing pain moreso on her R side, also radiates to her back. Pt denies abnormal bleeding, discharge, urinary symptoms. She states she had tubal ligation reversal and is currently trying to conceive. Denies fever, headache, changes in bowel movements. Past Medical History:   Diagnosis Date    Anemia     Anxiety     Arthritis     \"Left Hip\"    Back pain     Diabetes ([address] Dx     Hiatal hernia     Hyperlipidemia     \"They Put Me On Lipitor Because I'm Diabetic, I Have Never Had High Cholesterol\"    Infertility, female     Pelvic pain     Shoulder pain, left     Sleep apnea     Uses CPAP    Teeth missing     Upper    Wears glasses     Wears partial dentures     Upper       Past Surgical History:   Procedure Laterality Date    BREAST BIOPSY Bilateral 2006    Benign    DENTAL SURGERY      Teeth Extracted In The Past    ENDOSCOPY, COLON, DIAGNOSTIC  2017    HERNIA REPAIR      SLEEVE GASTRECTOMY  2017    Robotic sleeve gastrectomy and hiatal hernia repair    TUBAL LIGATION  2005    TUBAL LIGATION      reversal       Social History     Tobacco Use    Smoking status: Former Smoker     Packs/day: 0.50     Years: 13.00     Pack years: 6.50     Types: Cigarettes     Start date:      Quit date:      Years since quittin.5    Smokeless tobacco: Never Used   Vaping Use    Vaping Use: Never used   Substance Use Topics    Alcohol use: Not Currently     Comment: \"Occ.  Maybe Once A Year\"    Drug use: No       Family History   Problem Relation Age of Onset    High Blood Pressure Mother     High Cholesterol Mother     Bipolar Disorder Sister     Mental Illness Sister         Bipolar    Diabetes Brother     High Blood Pressure Sister     Stroke Brother     Asthma Daughter        Current Outpatient Medications   Medication Sig Dispense Refill    ferrous sulfate dried (SLOW IRON) 160 (50 Fe) MG TBCR extended release tablet Take 160 mg by mouth 2 times daily 60 tablet 5    vitamin D3 (CHOLECALCIFEROL) 25 MCG (1000 UT) TABS tablet Take 1 tablet by mouth daily 30 tablet 0    Prenatal Vit-Fe Fumarate-FA (PRENATAL VITAMINS) 28-0.8 MG TABS Take 1 tablet by mouth daily (any prenatal vitamin covered) 30 tablet 11    nitroGLYCERIN (NITROSTAT) 0.4 MG SL tablet Place 1 tablet under the tongue every 5 minutes as needed for Chest pain 25 tablet 3    Multiple Vitamins-Minerals (HM MULTIVITAMIN ADULT GUMMY PO) Take 2 Doses by mouth       No current facility-administered medications for this visit. Allergies   Allergen Reactions    Penicillins Shortness Of Breath    Venofer [Iron Sucrose] Hives and Shortness Of Breath       [id number]      There is no immunization history on file for this patient. Review of Systems   Constitutional: Negative. Negative for fatigue and fever. Respiratory: Negative. Gastrointestinal: Negative. Negative for abdominal pain. Endocrine: Negative. Genitourinary: Positive for pelvic pain. Negative for dysuria, frequency, menstrual problem, vaginal bleeding, vaginal discharge and vaginal pain. Musculoskeletal: Negative. Skin: Negative. Neurological: Negative. Negative for dizziness and headaches. Psychiatric/Behavioral: Negative. /76   Wt 249 lb (112.9 kg)   BMI 39.00 kg/m²     Physical Exam  Vitals and nursing note reviewed. Constitutional:       General: She is not in acute distress. Appearance: Normal appearance. She is obese. HENT:      Head: Normocephalic and atraumatic.    Pulmonary:      Effort: Pulmonary effort is normal. No respiratory distress. Abdominal:      Palpations: Abdomen is soft. Tenderness: There is no abdominal tenderness. Genitourinary:     General: Normal vulva. Exam position: Lithotomy position. Vagina: Vaginal discharge (thin, white) present. Cervix: Normal.      Uterus: Normal.       Adnexa: Right adnexa normal and left adnexa normal.   Musculoskeletal:         General: Normal range of motion. Cervical back: Normal range of motion. Skin:     General: Skin is warm and dry. Neurological:      General: No focal deficit present. Mental Status: She is alert and oriented to person, place, and time. Psychiatric:         Mood and Affect: Mood normal.         Speech: Speech normal.         Behavior: Behavior normal.         Thought Content: Thought content normal.         No results found for this visit on 06/30/22. ASSESSMENT AND PLAN   Diagnosis Orders   1. Pelvic pain  Vaginal Pathogens Probes *A    C.trachomatis N.gonorrhoeae DNA    HCG Qualitative, Serum    CBC with Auto Differential    US NON OB TRANSVAGINAL   2. Low back pain, unspecified back pain laterality, unspecified chronicity, unspecified whether sciatica present     3. Obesity (BMI 30-39. 9)       Swabs, hcg&cbc, u/s next week or two (pt going on vacation next week)    ER if severe pain, sudden bleeding pt verbalizes understanding    Return in about 2 weeks (around 7/14/2022) for ultrasound.     Romy Dave PA-C

## 2022-07-01 DIAGNOSIS — D64.9 LOW HEMOGLOBIN: ICD-10-CM

## 2022-07-01 DIAGNOSIS — B37.9 YEAST INFECTION: Primary | ICD-10-CM

## 2022-07-01 LAB
C TRACH DNA GENITAL QL NAA+PROBE: NEGATIVE
CANDIDA SPECIES, DNA PROBE: ABNORMAL
GARDNERELLA VAGINALIS, DNA PROBE: ABNORMAL
N. GONORRHOEAE DNA: NEGATIVE
TRICHOMONAS VAGINALIS DNA: ABNORMAL

## 2022-07-01 RX ORDER — FLUCONAZOLE 150 MG/1
150 TABLET ORAL ONCE
Qty: 1 TABLET | Refills: 0 | Status: SHIPPED | OUTPATIENT
Start: 2022-07-01 | End: 2022-07-01

## 2022-07-13 ENCOUNTER — TELEPHONE (OUTPATIENT)
Dept: INFUSION THERAPY | Age: 42
End: 2022-07-13

## 2022-07-13 RX ORDER — SODIUM CHLORIDE 0.9 % (FLUSH) 0.9 %
5-40 SYRINGE (ML) INJECTION PRN
Status: CANCELLED | OUTPATIENT
Start: 2022-07-20

## 2022-07-13 RX ORDER — DIPHENHYDRAMINE HYDROCHLORIDE 50 MG/ML
50 INJECTION INTRAMUSCULAR; INTRAVENOUS
Status: CANCELLED | OUTPATIENT
Start: 2022-07-20

## 2022-07-13 RX ORDER — ALBUTEROL SULFATE 90 UG/1
4 AEROSOL, METERED RESPIRATORY (INHALATION) PRN
Status: CANCELLED | OUTPATIENT
Start: 2022-07-20

## 2022-07-13 RX ORDER — EPINEPHRINE 1 MG/ML
0.3 INJECTION, SOLUTION, CONCENTRATE INTRAVENOUS PRN
Status: CANCELLED | OUTPATIENT
Start: 2022-07-20

## 2022-07-13 RX ORDER — ACETAMINOPHEN 325 MG/1
650 TABLET ORAL
Status: CANCELLED | OUTPATIENT
Start: 2022-07-20

## 2022-07-13 RX ORDER — ONDANSETRON 2 MG/ML
8 INJECTION INTRAMUSCULAR; INTRAVENOUS
Status: CANCELLED | OUTPATIENT
Start: 2022-07-20

## 2022-07-13 RX ORDER — HEPARIN SODIUM (PORCINE) LOCK FLUSH IV SOLN 100 UNIT/ML 100 UNIT/ML
500 SOLUTION INTRAVENOUS PRN
Status: CANCELLED | OUTPATIENT
Start: 2022-07-20

## 2022-07-13 RX ORDER — SODIUM CHLORIDE 9 MG/ML
5-250 INJECTION, SOLUTION INTRAVENOUS PRN
Status: CANCELLED | OUTPATIENT
Start: 2022-07-20

## 2022-07-13 RX ORDER — SODIUM CHLORIDE 9 MG/ML
INJECTION, SOLUTION INTRAVENOUS CONTINUOUS
Status: CANCELLED | OUTPATIENT
Start: 2022-07-20

## 2022-07-13 RX ORDER — FAMOTIDINE 10 MG/ML
20 INJECTION, SOLUTION INTRAVENOUS
Status: CANCELLED | OUTPATIENT
Start: 2022-07-20

## 2022-07-13 RX ORDER — MEPERIDINE HYDROCHLORIDE 50 MG/ML
12.5 INJECTION INTRAMUSCULAR; INTRAVENOUS; SUBCUTANEOUS PRN
Status: CANCELLED | OUTPATIENT
Start: 2022-07-20

## 2022-07-13 RX ORDER — DIPHENHYDRAMINE HYDROCHLORIDE 50 MG/ML
25 INJECTION INTRAMUSCULAR; INTRAVENOUS ONCE
Status: CANCELLED | OUTPATIENT
Start: 2022-07-20 | End: 2022-07-20

## 2022-07-13 NOTE — TELEPHONE ENCOUNTER
PT RETURNED OUR PHONE CALL TO SCHEDULE VENOFER, PT STATED SHE IS ALLERGIC TO VENOFER. VENFOER IS LISTED AS AN ALLERGY. [facility] NOTIFIED THAT PLAN WAS D/C.

## 2022-07-26 ENCOUNTER — TELEPHONE (OUTPATIENT)
Dept: ONCOLOGY | Age: 42
End: 2022-07-26

## 2022-07-26 NOTE — TELEPHONE ENCOUNTER
Called patient to schedule iron infusion, patient is scheduled for Thurs.  07/28 @ 0815, advised patient to notify us asap if unable to keep their appt time and if appt is No Showed or No Call, rescheduled appt may take up to 3 months to schedule, also no visitors at time of treatment, patient states understanding

## 2022-07-28 ENCOUNTER — HOSPITAL ENCOUNTER (OUTPATIENT)
Dept: INFUSION THERAPY | Age: 42
Discharge: HOME | End: 2022-07-28
Payer: COMMERCIAL

## 2022-07-28 VITALS
OXYGEN SATURATION: 98 % | TEMPERATURE: 96.9 F | SYSTOLIC BLOOD PRESSURE: 122 MMHG | HEIGHT: 67 IN | HEART RATE: 89 BPM | WEIGHT: 250 LBS | DIASTOLIC BLOOD PRESSURE: 58 MMHG | BODY MASS INDEX: 39.24 KG/M2

## 2022-07-28 DIAGNOSIS — D50.9 IRON DEFICIENCY ANEMIA, UNSPECIFIED IRON DEFICIENCY ANEMIA TYPE: ICD-10-CM

## 2022-07-28 DIAGNOSIS — K90.9 MALABSORPTION OF IRON: ICD-10-CM

## 2022-07-28 DIAGNOSIS — D50.0 IRON DEFICIENCY ANEMIA SECONDARY TO BLOOD LOSS (CHRONIC): Primary | ICD-10-CM

## 2022-07-28 PROCEDURE — 96365 THER/PROPH/DIAG IV INF INIT: CPT

## 2022-07-28 PROCEDURE — 6370000000 HC RX 637 (ALT 250 FOR IP): Performed by: INTERNAL MEDICINE

## 2022-07-28 PROCEDURE — 96366 THER/PROPH/DIAG IV INF ADDON: CPT

## 2022-07-28 PROCEDURE — 2580000003 HC RX 258: Performed by: INTERNAL MEDICINE

## 2022-07-28 PROCEDURE — 96375 TX/PRO/DX INJ NEW DRUG ADDON: CPT

## 2022-07-28 PROCEDURE — 6360000002 HC RX W HCPCS: Performed by: INTERNAL MEDICINE

## 2022-07-28 RX ORDER — DIPHENHYDRAMINE HYDROCHLORIDE 50 MG/ML
50 INJECTION INTRAMUSCULAR; INTRAVENOUS
Status: CANCELLED | OUTPATIENT
Start: 2022-08-04

## 2022-07-28 RX ORDER — SODIUM CHLORIDE 9 MG/ML
INJECTION, SOLUTION INTRAVENOUS CONTINUOUS
Status: CANCELLED | OUTPATIENT
Start: 2022-08-04

## 2022-07-28 RX ORDER — HEPARIN SODIUM (PORCINE) LOCK FLUSH IV SOLN 100 UNIT/ML 100 UNIT/ML
500 SOLUTION INTRAVENOUS PRN
Status: CANCELLED | OUTPATIENT
Start: 2022-08-04

## 2022-07-28 RX ORDER — EPINEPHRINE 1 MG/ML
0.3 INJECTION, SOLUTION, CONCENTRATE INTRAVENOUS PRN
Status: CANCELLED | OUTPATIENT
Start: 2022-08-04

## 2022-07-28 RX ORDER — ACETAMINOPHEN 325 MG/1
650 TABLET ORAL
Status: CANCELLED | OUTPATIENT
Start: 2022-08-04

## 2022-07-28 RX ORDER — DIPHENHYDRAMINE HYDROCHLORIDE 50 MG/ML
25 INJECTION INTRAMUSCULAR; INTRAVENOUS ONCE
Status: CANCELLED | OUTPATIENT
Start: 2022-08-04 | End: 2022-08-04

## 2022-07-28 RX ORDER — SODIUM CHLORIDE 9 MG/ML
5-250 INJECTION, SOLUTION INTRAVENOUS PRN
Status: CANCELLED | OUTPATIENT
Start: 2022-08-04

## 2022-07-28 RX ORDER — FAMOTIDINE 10 MG/ML
20 INJECTION, SOLUTION INTRAVENOUS
Status: CANCELLED | OUTPATIENT
Start: 2022-08-04

## 2022-07-28 RX ORDER — SODIUM CHLORIDE 9 MG/ML
INJECTION, SOLUTION INTRAVENOUS ONCE
Status: COMPLETED | OUTPATIENT
Start: 2022-07-28 | End: 2022-07-28

## 2022-07-28 RX ORDER — ACETAMINOPHEN 325 MG/1
650 TABLET ORAL ONCE
Status: COMPLETED | OUTPATIENT
Start: 2022-07-28 | End: 2022-07-28

## 2022-07-28 RX ORDER — SODIUM CHLORIDE 0.9 % (FLUSH) 0.9 %
5-40 SYRINGE (ML) INJECTION PRN
Status: CANCELLED | OUTPATIENT
Start: 2022-08-04

## 2022-07-28 RX ORDER — DIPHENHYDRAMINE HYDROCHLORIDE 50 MG/ML
25 INJECTION INTRAMUSCULAR; INTRAVENOUS ONCE
Status: DISCONTINUED | OUTPATIENT
Start: 2022-07-28 | End: 2022-07-28

## 2022-07-28 RX ORDER — ALBUTEROL SULFATE 90 UG/1
4 AEROSOL, METERED RESPIRATORY (INHALATION) PRN
Status: CANCELLED | OUTPATIENT
Start: 2022-08-04

## 2022-07-28 RX ORDER — ONDANSETRON 2 MG/ML
8 INJECTION INTRAMUSCULAR; INTRAVENOUS
Status: CANCELLED | OUTPATIENT
Start: 2022-08-04

## 2022-07-28 RX ORDER — MEPERIDINE HYDROCHLORIDE 25 MG/ML
12.5 INJECTION INTRAMUSCULAR; INTRAVENOUS; SUBCUTANEOUS PRN
Status: CANCELLED | OUTPATIENT
Start: 2022-08-04

## 2022-07-28 RX ADMIN — ACETAMINOPHEN 650 MG: 325 TABLET ORAL at 09:08

## 2022-07-28 RX ADMIN — SODIUM CHLORIDE: 9 INJECTION, SOLUTION INTRAVENOUS at 09:12

## 2022-07-28 RX ADMIN — SODIUM CHLORIDE 1000 MG: 9 INJECTION, SOLUTION INTRAVENOUS at 09:46

## 2022-07-28 RX ADMIN — DEXAMETHASONE SODIUM PHOSPHATE 10 MG: 10 INJECTION INTRAMUSCULAR; INTRAVENOUS at 09:13

## 2022-07-28 ASSESSMENT — PAIN SCALES - GENERAL: PAINLEVEL_OUTOF10: 0

## 2022-07-28 NOTE — PROGRESS NOTES
Pt.  Ambulated into treatment area for Infed infusion. Peripheral IV started in right arm, good blood return noted. Treatment administered without incident. Pt. Remained for 30 minute observation period. Discharge AVS given.

## 2022-09-19 ENCOUNTER — HOSPITAL ENCOUNTER (OUTPATIENT)
Dept: INFUSION THERAPY | Age: 42
Discharge: HOME | End: 2022-09-19
Payer: COMMERCIAL

## 2022-09-19 DIAGNOSIS — D50.9 IRON DEFICIENCY ANEMIA, UNSPECIFIED IRON DEFICIENCY ANEMIA TYPE: ICD-10-CM

## 2022-09-19 DIAGNOSIS — K90.9 MALABSORPTION OF IRON: ICD-10-CM

## 2022-09-19 LAB
ALBUMIN SERPL-MCNC: 4.4 GM/DL (ref 3.4–5)
ALP BLD-CCNC: 52 IU/L (ref 40–129)
ALT SERPL-CCNC: 12 U/L (ref 10–40)
ANION GAP SERPL CALCULATED.3IONS-SCNC: 9 MMOL/L (ref 4–16)
AST SERPL-CCNC: 16 IU/L (ref 15–37)
BASOPHILS ABSOLUTE: 0 K/CU MM
BASOPHILS RELATIVE PERCENT: 0.5 % (ref 0–1)
BILIRUB SERPL-MCNC: 0.4 MG/DL (ref 0–1)
BUN BLDV-MCNC: 11 MG/DL (ref 6–23)
CALCIUM SERPL-MCNC: 9.3 MG/DL (ref 8.3–10.6)
CHLORIDE BLD-SCNC: 105 MMOL/L (ref 99–110)
CO2: 27 MMOL/L (ref 21–32)
CREAT SERPL-MCNC: 0.6 MG/DL (ref 0.6–1.1)
DIFFERENTIAL TYPE: ABNORMAL
EOSINOPHILS ABSOLUTE: 0.1 K/CU MM
EOSINOPHILS RELATIVE PERCENT: 1.7 % (ref 0–3)
FERRITIN: 29 NG/ML (ref 15–150)
FOLATE: >20 NG/ML (ref 3.1–17.5)
GFR AFRICAN AMERICAN: >60 ML/MIN/1.73M2
GFR NON-AFRICAN AMERICAN: >60 ML/MIN/1.73M2
GLUCOSE BLD-MCNC: 146 MG/DL (ref 70–99)
HCT VFR BLD CALC: 35 % (ref 37–47)
HEMOGLOBIN: 10.9 GM/DL (ref 12.5–16)
IMMATURE NEUTROPHIL %: 0.2 % (ref 0–0.43)
IRON: 42 UG/DL (ref 37–145)
LYMPHOCYTES ABSOLUTE: 1.8 K/CU MM
LYMPHOCYTES RELATIVE PERCENT: 28.1 % (ref 24–44)
MCH RBC QN AUTO: 26 PG (ref 27–31)
MCHC RBC AUTO-ENTMCNC: 31.1 % (ref 32–36)
MCV RBC AUTO: 83.5 FL (ref 78–100)
MONOCYTES ABSOLUTE: 0.5 K/CU MM
MONOCYTES RELATIVE PERCENT: 7.5 % (ref 0–4)
NUCLEATED RBC %: 0 %
PCT TRANSFERRIN: 16 % (ref 10–44)
PDW BLD-RTO: 22.5 % (ref 11.7–14.9)
PLATELET # BLD: 283 K/CU MM (ref 140–440)
PMV BLD AUTO: 10.6 FL (ref 7.5–11.1)
POTASSIUM SERPL-SCNC: 3.8 MMOL/L (ref 3.5–5.1)
RBC # BLD: 4.19 M/CU MM (ref 4.2–5.4)
SEGMENTED NEUTROPHILS ABSOLUTE COUNT: 3.9 K/CU MM
SEGMENTED NEUTROPHILS RELATIVE PERCENT: 62 % (ref 36–66)
SODIUM BLD-SCNC: 141 MMOL/L (ref 135–145)
TOTAL IMMATURE NEUTOROPHIL: 0.01 K/CU MM
TOTAL IRON BINDING CAPACITY: 255 UG/DL (ref 250–450)
TOTAL NUCLEATED RBC: 0 K/CU MM
TOTAL PROTEIN: 6.6 GM/DL (ref 6.4–8.2)
UNSATURATED IRON BINDING CAPACITY: 213 UG/DL (ref 110–370)
VITAMIN B-12: 843.2 PG/ML (ref 211–911)
WBC # BLD: 6.3 K/CU MM (ref 4–10.5)

## 2022-09-19 PROCEDURE — 36415 COLL VENOUS BLD VENIPUNCTURE: CPT

## 2022-09-19 PROCEDURE — 80053 COMPREHEN METABOLIC PANEL: CPT

## 2022-09-19 PROCEDURE — 83550 IRON BINDING TEST: CPT

## 2022-09-19 PROCEDURE — 85025 COMPLETE CBC W/AUTO DIFF WBC: CPT

## 2022-09-19 PROCEDURE — 82607 VITAMIN B-12: CPT

## 2022-09-19 PROCEDURE — 83540 ASSAY OF IRON: CPT

## 2022-09-19 PROCEDURE — 82746 ASSAY OF FOLIC ACID SERUM: CPT

## 2022-09-19 PROCEDURE — 82728 ASSAY OF FERRITIN: CPT

## 2022-09-21 ENCOUNTER — TELEPHONE (OUTPATIENT)
Dept: OBGYN | Age: 42
End: 2022-09-21

## 2022-09-21 NOTE — TELEPHONE ENCOUNTER
Pt needs refill of her prenatal vitamins sent to the pharmacy.   Annual sched 10/4/2022, please sign pend medication

## 2022-09-22 RX ORDER — PNV NO.95/FERROUS FUM/FOLIC AC 28MG-0.8MG
1 TABLET ORAL DAILY
Qty: 90 TABLET | Refills: 2 | Status: SHIPPED | OUTPATIENT
Start: 2022-09-22

## 2022-11-01 ENCOUNTER — HOSPITAL ENCOUNTER (OUTPATIENT)
Dept: INFUSION THERAPY | Age: 42
Discharge: HOME | End: 2022-11-01
Payer: COMMERCIAL

## 2022-11-01 ENCOUNTER — OFFICE VISIT (OUTPATIENT)
Dept: ONCOLOGY | Age: 42
End: 2022-11-01
Payer: COMMERCIAL

## 2022-11-01 VITALS
TEMPERATURE: 97.4 F | BODY MASS INDEX: 37.67 KG/M2 | WEIGHT: 240 LBS | DIASTOLIC BLOOD PRESSURE: 73 MMHG | SYSTOLIC BLOOD PRESSURE: 117 MMHG | HEIGHT: 67 IN | RESPIRATION RATE: 18 BRPM | OXYGEN SATURATION: 98 % | HEART RATE: 80 BPM

## 2022-11-01 DIAGNOSIS — D50.9 IRON DEFICIENCY ANEMIA, UNSPECIFIED IRON DEFICIENCY ANEMIA TYPE: ICD-10-CM

## 2022-11-01 DIAGNOSIS — K90.9 MALABSORPTION OF IRON: Primary | ICD-10-CM

## 2022-11-01 DIAGNOSIS — K90.9 MALABSORPTION OF IRON: ICD-10-CM

## 2022-11-01 LAB
BASOPHILS ABSOLUTE: 0 K/CU MM
BASOPHILS RELATIVE PERCENT: 0.6 % (ref 0–1)
DIFFERENTIAL TYPE: ABNORMAL
EOSINOPHILS ABSOLUTE: 0.2 K/CU MM
EOSINOPHILS RELATIVE PERCENT: 3.2 % (ref 0–3)
HCT VFR BLD CALC: 35 % (ref 37–47)
HEMOGLOBIN: 11.2 GM/DL (ref 12.5–16)
LYMPHOCYTES ABSOLUTE: 2.6 K/CU MM
LYMPHOCYTES RELATIVE PERCENT: 40.8 % (ref 24–44)
MCH RBC QN AUTO: 26.4 PG (ref 27–31)
MCHC RBC AUTO-ENTMCNC: 32 % (ref 32–36)
MCV RBC AUTO: 82.5 FL (ref 78–100)
MONOCYTES ABSOLUTE: 0.5 K/CU MM
MONOCYTES RELATIVE PERCENT: 8 % (ref 0–4)
PDW BLD-RTO: 15.4 % (ref 11.7–14.9)
PLATELET # BLD: 265 K/CU MM (ref 140–440)
PMV BLD AUTO: 10.3 FL (ref 7.5–11.1)
RBC # BLD: 4.24 M/CU MM (ref 4.2–5.4)
SEGMENTED NEUTROPHILS ABSOLUTE COUNT: 3 K/CU MM
SEGMENTED NEUTROPHILS RELATIVE PERCENT: 47.4 % (ref 36–66)
WBC # BLD: 6.3 K/CU MM (ref 4–10.5)

## 2022-11-01 PROCEDURE — 3078F DIAST BP <80 MM HG: CPT | Performed by: INTERNAL MEDICINE

## 2022-11-01 PROCEDURE — 99211 OFF/OP EST MAY X REQ PHY/QHP: CPT

## 2022-11-01 PROCEDURE — 36415 COLL VENOUS BLD VENIPUNCTURE: CPT

## 2022-11-01 PROCEDURE — 85025 COMPLETE CBC W/AUTO DIFF WBC: CPT

## 2022-11-01 PROCEDURE — 3074F SYST BP LT 130 MM HG: CPT | Performed by: INTERNAL MEDICINE

## 2022-11-01 PROCEDURE — G8484 FLU IMMUNIZE NO ADMIN: HCPCS | Performed by: INTERNAL MEDICINE

## 2022-11-01 PROCEDURE — 1036F TOBACCO NON-USER: CPT | Performed by: INTERNAL MEDICINE

## 2022-11-01 PROCEDURE — G8427 DOCREV CUR MEDS BY ELIG CLIN: HCPCS | Performed by: INTERNAL MEDICINE

## 2022-11-01 PROCEDURE — 99214 OFFICE O/P EST MOD 30 MIN: CPT | Performed by: INTERNAL MEDICINE

## 2022-11-01 PROCEDURE — G8417 CALC BMI ABV UP PARAM F/U: HCPCS | Performed by: INTERNAL MEDICINE

## 2022-11-01 RX ORDER — LETROZOLE 2.5 MG/1
TABLET, FILM COATED ORAL
COMMUNITY
Start: 2022-10-05

## 2022-11-01 NOTE — PROGRESS NOTES
Patient Name: Greer Clark  Patient : 1980  Patient MRN: [id number]     Primary Oncologist: Tomasa Ortiz MD  PCP: [facility]     Date of Service: 2022      Chief Complaint:   Chief Complaint   Patient presents with    Follow-up        Active Problem list  1. Malabsorption of iron    2. Iron deficiency anemia, unspecified iron deficiency anemia type           HPI:        3/11/19: Arrived alone to the clinic. Reported that she had the gastric sleeve in 2017, lost 45 lbs. Reported that her menstrual periods are heavy at times, associated with a lot of clots, would use 8-10 pads per day for the first three days and then lighter for the next 3-4 days. No other overt bleeding. Reported fatigue. PICKIMBERLY sx. reported that she has a craving to smell alcohol pads. Intermittent chest pain. Has been evaluated and was told that she has reflux Sx. Denied any sob. No palpitattions, dizziness. Intermittent lower abdominal pain, but denied any nausea, emesis, constipation or any diarrhea. Denied any  sx. No fevr, night sweats, weight loss, lad. No vision changes, focal weakness. Reported intermittent ha, associated with smelling of alcohol pads. No dysphagia, odynophagia. STNA at acute rehab.  3/4/19: CBC with wbc 5.5, normal diff, Hb 10.1, Hct 41, mcv 75, platelets 690  CMP wnl  Ferritin 5 and iron sats 5%    3/17/2019 CT scan of the abdomen pelvis without contrast revealed nonobstructing 2 mm left renal calculus. Liver spleen pancreas and bilateral adrenal glands are normal. Gallbladder is near completely collapsed. Postsurgical changes of prior gastric sleeve. No suspicious osteolytic or osteoblastic lesions.      Received Ferriheme  CBC with wbc 3.7,anc 1800, Hb 8.8, Hct 29, mcv 69, paltelets 294, ferritin 6, iron sats 4%, b12 451, folate 16, Vitamin D 30    Received Infed 2021     US RP and abd ordered for tomorrow 21 Ultrasound:  Normal uterus with an endometrial thickness of 2.68 cm, and normal    ovaries. On October 6, 2021 the endometrial thickness was 6.2 mm and the    left ovarian cyst seen on this ultrasound in October are now resolved. The full ultrasound report is located in the media tab for further    details. October 2021 Mammogram:  No mammographic evidence of malignancy. BIRADS:   BIRADS - CATEGORY 1       Negative, no evidence of malignancy. Normal interval follow-up is   recommended in 12 months. OVERALL ASSESSMENT - NEGATIVE       A letter of notification will be sent to the patient regarding the results. 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 Having heavy menstrual bleeding. Trying to conceive. No other overt bleeding. Worked last night, hence tired today. No PICA sx. No fever. No chest pain, increased sob, palpitations or any dizziness. No abdominal pain. No LE edema.  Is on SlowFe OD     Review of Systems   Per interval history; otherwise 10 point ROS is negative Vital Signs:  /73 (Site: Left Upper Arm, Position: Sitting)   Pulse 80   Temp 97.4 °F (36.3 °C) (Temporal)   Resp 18   Ht 5' 7\" (1.702 m)   Wt 240 lb (108.9 kg) Comment: pt stated weight  SpO2 98%   BMI 37.59 kg/m²     Physical Exam:  CONSTITUTIONAL: awake, alert, cooperative, obese , tired appearing.    EYES: No palor , no icterus   ENT: ATNC   NECK: no JVD   HEMATOLOGIC/LYMPHATIC: no cervical, supraclavicular or axillary lymphadenopathy   LUNGS:ctab   CARDIOVASCULAR: s1s2 rrr no murmurs  ABDOMEN: soft ntnd bs pos  NEUROLOGIC: GI  SKIN: No rash  EXTREMITIES: no LE edema bilaterally       Labs:    Hematology:  Lab Results   Component Value Date    WBC 6.3 09/19/2022    RBC 4.19 (L) 09/19/2022    HGB 10.9 (L) 09/19/2022    HCT 35.0 (L) 09/19/2022    MCV 83.5 09/19/2022    MCH 26.0 (L) 09/19/2022    MCHC 31.1 (L) 09/19/2022    RDW 22.5 (H) 09/19/2022     09/19/2022    MPV 10.6 09/19/2022    SEGSPCT 62.0 09/19/2022    EOSRELPCT 1.7 09/19/2022    BASOPCT 0.5 09/19/2022    LYMPHOPCT 28.1 09/19/2022    MONOPCT 7.5 (H) 09/19/2022    SEGSABS 3.9 09/19/2022    EOSABS 0.1 09/19/2022    BASOSABS 0.0 09/19/2022    LYMPHSABS 1.8 09/19/2022    MONOSABS 0.5 09/19/2022    DIFFTYPE AUTOMATED DIFFERENTIAL 09/19/2022     Lab Results   Component Value Date    ESR 11 03/11/2019       Chemistry:  Lab Results   Component Value Date     09/19/2022    K 3.8 09/19/2022     09/19/2022    CO2 27 09/19/2022    BUN 11 09/19/2022    CREATININE 0.6 09/19/2022    GLUCOSE 146 (H) 09/19/2022    CALCIUM 9.3 09/19/2022    PROT 6.6 09/19/2022    LABALBU 4.4 09/19/2022    BILITOT 0.4 09/19/2022    ALKPHOS 52 09/19/2022    AST 16 09/19/2022    ALT 12 09/19/2022    LABGLOM >60 09/19/2022    GFRAA >60 09/19/2022    MG 1.9 02/09/2018    POCGLU 71 09/06/2017     Lab Results   Component Value Date     03/11/2019     No components found for: LD  Lab Results   Component Value Date    TSHHS 1.530 03/11/2019 Immunology:  Lab Results   Component Value Date    PROT 6.6 09/19/2022    SPEP INTERPRETATION - Within normal limits. RS 03/11/2019    SPEP INTERPRETATION - Within normal limits. RS 03/11/2019    ALBUMINELP 3.9 03/11/2019    LABALPH 0.3 03/11/2019    LABALPH 0.6 03/11/2019    LABBETA 1.2 03/11/2019    GAMGLOB 1.5 03/11/2019     No results found for: Colan Neva, KLFLCR  No results found for: B2M    Coagulation Panel:  Lab Results   Component Value Date    DDIMER <200 05/21/2016       Anemia Panel:  Lab Results   Component Value Date    PTYPGQOZ82 843.2 09/19/2022    FOLATE >20.0 (H) 09/19/2022       Tumor Markers:  No results found for: , CEA, , LABCA2, PSA      Imaging: Reviewed     Pathology:Reviewed     Observations:  Performance Status: ECOG 0  Depression Status: No data recorded          Assessment & Plan: Anival Rondon female s/p gastric sleeve referred for anemia    Anemia: iron indices, microcytosis and ferritin were  suggestive of thi. Most probably secondary to malabsorption from gastric sleeve and also note menorrhagia. No evidence of other blood loss, other nutritional def, hemolysis, monoclonal gammopathy, thyroid dys, def of microelements. Has received parenteral iron in July 2022,  is on SlowFe bid, advised her to take every other day. Continue Vitamin D supplements. Hb 10.9 and ferritin 29. Anemia could also be sec to other mineral def. But discussed further evaluation with BMB/aspiration if anemia persists even after adequate iron replacement. Follows with Gyn. Recommended GI evaluation in the past.    Menorrhagia: Is being followed by Gyn. Holding off on any ablation as she plans to get pregnant. Was on letrozole but has been discontinued    HTN: Recommend that she maintains a log and discusses with PCP if remains elevated, recommend low salt diet, weight loss if possible and exercise. BP better controlled      Recommend vitamin D supplements.      Continue other medical care    Discussed the above findings and plan with her and she verbalized understanding    Discussed healthy lifestyle, regular exercise and also weight loss. Discussed the importance of being uptodate with age appropriate screening tools.    Mammogram October 2021 normal, due 2022     RTC  feb 2023  or earlier if new Sx    PENELOPE

## 2022-11-01 NOTE — PROGRESS NOTES
MA Rooming Questions  Patient: Lori Patel  MRN: [id number]    Date: 11/1/2022        1. Do you have any new issues?   no         2. Do you need any refills on medications?    no    3. Have you had any imaging done since your last visit?   no    4. Have you been hospitalized or seen in the emergency room since your last visit here?   no    5. Did the patient have a depression screening completed today?  No    No data recorded     PHQ-9 Given to (if applicable):               PHQ-9 Score (if applicable):                     [] Positive     []  Negative              Does question #9 need addressed (if applicable)                     [] Yes    []  No               Kiera Tesfaye CMA

## 2023-01-11 ENCOUNTER — HOSPITAL ENCOUNTER (OUTPATIENT)
Dept: WOMENS IMAGING | Age: 43
Discharge: HOME | End: 2023-01-11

## 2023-01-11 DIAGNOSIS — Z12.31 SCREENING MAMMOGRAM, ENCOUNTER FOR: ICD-10-CM

## 2023-02-02 ENCOUNTER — OFFICE VISIT (OUTPATIENT)
Dept: OBGYN | Age: 43
End: 2023-02-02
Payer: COMMERCIAL

## 2023-02-02 VITALS
DIASTOLIC BLOOD PRESSURE: 77 MMHG | SYSTOLIC BLOOD PRESSURE: 117 MMHG | BODY MASS INDEX: 37.67 KG/M2 | HEIGHT: 67 IN | WEIGHT: 240 LBS

## 2023-02-02 DIAGNOSIS — Z01.419 ENCOUNTER FOR ANNUAL ROUTINE GYNECOLOGICAL EXAMINATION: Primary | ICD-10-CM

## 2023-02-02 DIAGNOSIS — Z12.31 SCREENING MAMMOGRAM FOR BREAST CANCER: ICD-10-CM

## 2023-02-02 DIAGNOSIS — N92.6 IRREGULAR MENSES: ICD-10-CM

## 2023-02-02 LAB
FOLLICLE STIMULATING HORMONE: 8 MIU/ML
HCG QUALITATIVE: NEGATIVE
PROLACTIN: 11 NG/ML
TSH REFLEX FT4: 3.33 UIU/ML (ref 0.27–4.2)

## 2023-02-02 PROCEDURE — 99396 PREV VISIT EST AGE 40-64: CPT | Performed by: OBSTETRICS & GYNECOLOGY

## 2023-02-02 PROCEDURE — 3074F SYST BP LT 130 MM HG: CPT | Performed by: OBSTETRICS & GYNECOLOGY

## 2023-02-02 PROCEDURE — 36415 COLL VENOUS BLD VENIPUNCTURE: CPT | Performed by: OBSTETRICS & GYNECOLOGY

## 2023-02-02 PROCEDURE — 3078F DIAST BP <80 MM HG: CPT | Performed by: OBSTETRICS & GYNECOLOGY

## 2023-02-02 PROCEDURE — G8484 FLU IMMUNIZE NO ADMIN: HCPCS | Performed by: OBSTETRICS & GYNECOLOGY

## 2023-02-02 SDOH — ECONOMIC STABILITY: FOOD INSECURITY: WITHIN THE PAST 12 MONTHS, THE FOOD YOU BOUGHT JUST DIDN'T LAST AND YOU DIDN'T HAVE MONEY TO GET MORE.: NEVER TRUE

## 2023-02-02 SDOH — ECONOMIC STABILITY: HOUSING INSECURITY
IN THE LAST 12 MONTHS, WAS THERE A TIME WHEN YOU DID NOT HAVE A STEADY PLACE TO SLEEP OR SLEPT IN A SHELTER (INCLUDING NOW)?: NO

## 2023-02-02 SDOH — ECONOMIC STABILITY: FOOD INSECURITY: WITHIN THE PAST 12 MONTHS, YOU WORRIED THAT YOUR FOOD WOULD RUN OUT BEFORE YOU GOT MONEY TO BUY MORE.: NEVER TRUE

## 2023-02-02 SDOH — ECONOMIC STABILITY: INCOME INSECURITY: HOW HARD IS IT FOR YOU TO PAY FOR THE VERY BASICS LIKE FOOD, HOUSING, MEDICAL CARE, AND HEATING?: NOT HARD AT ALL

## 2023-02-02 ASSESSMENT — ENCOUNTER SYMPTOMS
EYES NEGATIVE: 1
ALLERGIC/IMMUNOLOGIC NEGATIVE: 1
GASTROINTESTINAL NEGATIVE: 1
RESPIRATORY NEGATIVE: 1

## 2023-02-02 ASSESSMENT — PATIENT HEALTH QUESTIONNAIRE - PHQ9
SUM OF ALL RESPONSES TO PHQ QUESTIONS 1-9: 0
1. LITTLE INTEREST OR PLEASURE IN DOING THINGS: 0
SUM OF ALL RESPONSES TO PHQ QUESTIONS 1-9: 0
SUM OF ALL RESPONSES TO PHQ9 QUESTIONS 1 & 2: 0
2. FEELING DOWN, DEPRESSED OR HOPELESS: 0

## 2023-02-02 NOTE — PROGRESS NOTES
2/2/23    [facility]  1980    Chief Complaint   Patient presents with    Gynecologic Exam     Annual exam, Normal Menses, LMP 1/19/2023, is sexually active, Last pap smear was 2021 ascus - hpv. The patient is a 44 y.o. female, [id number] who presents for her annual exam.  She is menstruating normally. She is  sexually active. She is not currently taking birth control. She reports no gynecological symptoms. Pap smear history: Her last PAP smear was in 2021. Her results were normal.    Breast history: her most recent mammogram was in 2021.   The results were: Normal    Past Medical History:   Diagnosis Date    Anemia     Anxiety     Arthritis     \"Left Hip\"    Back pain     Diabetes ([address] Dx 2008    Hiatal hernia     Hyperlipidemia     \"They Put Me On Lipitor Because I'm Diabetic, I Have Never Had High Cholesterol\"    Infertility, female     Pelvic pain     Shoulder pain, left     Sleep apnea     Uses CPAP    Teeth missing     Upper    Type 2 diabetes mellitus without complication ([address]     Wears glasses     Wears partial dentures     Upper       Past Surgical History:   Procedure Laterality Date    BREAST BIOPSY Bilateral 2006    Benign    DENTAL SURGERY      Teeth Extracted In The Past    ENDOSCOPY, COLON, DIAGNOSTIC  2017    HERNIA REPAIR      SLEEVE GASTRECTOMY  09/05/2017    Robotic sleeve gastrectomy and hiatal hernia repair    TUBAL LIGATION  2005    TUBAL LIGATION      reversal       Family History   Problem Relation Age of Onset    High Blood Pressure Mother     High Cholesterol Mother     Bipolar Disorder Sister     Mental Illness Sister         Bipolar    Diabetes Brother     High Blood Pressure Sister     Stroke Brother     Asthma Daughter        Social History     Tobacco Use    Smoking status: Former     Packs/day: 0.50     Years: 13.00     Pack years: 6.50     Types: Cigarettes     Start date: 1995     Quit date: 2008     Years since quitting: 15.0    Smokeless tobacco: Never   Vaping Use    Vaping Use: Never used   Substance Use Topics    Alcohol use: Not Currently     Comment: \"Occ. Maybe Once A Year\"    Drug use: No       Current Outpatient Medications   Medication Sig Dispense Refill    metFORMIN (GLUCOPHAGE) 500 MG tablet Take 500 mg by mouth 2 times daily (with meals)      letrozole (FEMARA) 2.5 MG tablet PLEASE SEE ATTACHED FOR DETAILED DIRECTIONS      Prenatal Vit-Fe Fumarate-FA (PRENATAL VITAMINS) 28-0.8 MG TABS Take 1 tablet by mouth daily 90 tablet 2    ferrous sulfate dried (SLOW FE) 160 (50 Fe) MG TBCR extended release tablet Take 160 mg by mouth 2 times daily 30 tablet 0    vitamin D3 (CHOLECALCIFEROL) 25 MCG (1000 UT) TABS tablet Take 1 tablet by mouth daily 30 tablet 0    Multiple Vitamins-Minerals (HM MULTIVITAMIN ADULT GUMMY PO) Take 2 Doses by mouth      nitroGLYCERIN (NITROSTAT) 0.4 MG SL tablet Place 1 tablet under the tongue every 5 minutes as needed for Chest pain (Patient not taking: Reported on 2023) 25 tablet 3     No current facility-administered medications for this visit.       Allergies   Allergen Reactions    Penicillins Shortness Of Breath    Venofer [Iron Sucrose] Hives and Shortness Of Breath             There is no immunization history on file for this patient.    Review of Systems   Constitutional: Negative.    Eyes: Negative.    Respiratory: Negative.     Cardiovascular: Negative.    Gastrointestinal: Negative.    Endocrine: Negative.    Genitourinary: Negative.    Musculoskeletal: Negative.    Skin: Negative.    Allergic/Immunologic: Negative.    Neurological: Negative.    Hematological: Negative.    Psychiatric/Behavioral: Negative.       /77 (Site: Left Upper Arm, Position: Sitting, Cuff Size: Large Adult)   Ht 5' 7\" (1.702 m)   Wt 240 lb (108.9 kg)   BMI 37.59 kg/m²     Physical Exam  Exam conducted with a chaperone present.   Constitutional:       Appearance: Normal appearance.   HENT:      Head: Normocephalic and atraumatic.  Nose: Nose normal.   Eyes:      Conjunctiva/sclera: Conjunctivae normal.   Cardiovascular:      Rate and Rhythm: Normal rate. Pulses: Normal pulses. Pulmonary:      Effort: Pulmonary effort is normal.   Chest:   Breasts:     Right: Normal.      Left: Normal.   Abdominal:      General: Abdomen is flat. Bowel sounds are normal.      Palpations: Abdomen is soft. Hernia: There is no hernia in the left inguinal area or right inguinal area. Genitourinary:     General: Normal vulva. Exam position: Lithotomy position. Labia:         Right: No rash, tenderness or lesion. Left: No rash, tenderness or lesion. Urethra: No prolapse. Vagina: Normal. No foreign body. No vaginal discharge, erythema, tenderness, bleeding, lesions or prolapsed vaginal walls. Cervix: No cervical motion tenderness, discharge, friability, lesion, erythema or cervical bleeding. Uterus: Normal. Not enlarged, not tender and no uterine prolapse. Adnexa: Right adnexa normal and left adnexa normal.        Right: No mass, tenderness or fullness. Left: No mass, tenderness or fullness. Musculoskeletal:      Cervical back: Normal range of motion and neck supple. Lymphadenopathy:      Upper Body:      Right upper body: No supraclavicular, axillary or pectoral adenopathy. Left upper body: No supraclavicular, axillary or pectoral adenopathy. Skin:     General: Skin is warm. Coloration: Skin is not ashen or cyanotic. Findings: No abrasion, abscess or bruising. Rash is not crusting or macular. Neurological:      Mental Status: She is alert and oriented to person, place, and time. No results found for this visit on 02/02/23. Assessment and Plan   Diagnosis Orders   1. Encounter for annual routine gynecological examination        2. Screening mammogram for breast cancer  PEGGY DIGITAL SCREEN W CAD BILATERAL PER PROTOCOL      3.  Irregular menses  Prolactin    TSH with Reflex to FT4    Testosterone, free, total    Follicle Stimulating Hormone    HCG Qualitative, Serum    Follicle Stimulating Hormone    ANTI MULLERIAN HORMONE          Return in about 1 year (around 2/2/2024).   Had hsg at [address] [place] [address]  Follow up several weeks  Cristhian Luther MD

## 2023-02-05 LAB — ANTI MULLERIAN HORMONE: 1.15 NG/ML

## 2023-02-16 ENCOUNTER — OFFICE VISIT (OUTPATIENT)
Dept: OBGYN | Age: 43
End: 2023-02-16
Payer: COMMERCIAL

## 2023-02-16 VITALS
BODY MASS INDEX: 40.49 KG/M2 | HEART RATE: 62 BPM | HEIGHT: 67 IN | WEIGHT: 258 LBS | SYSTOLIC BLOOD PRESSURE: 128 MMHG | DIASTOLIC BLOOD PRESSURE: 83 MMHG

## 2023-02-16 DIAGNOSIS — N92.6 IRREGULAR MENSES: Primary | ICD-10-CM

## 2023-02-16 PROCEDURE — 99214 OFFICE O/P EST MOD 30 MIN: CPT | Performed by: OBSTETRICS & GYNECOLOGY

## 2023-02-16 PROCEDURE — 1036F TOBACCO NON-USER: CPT | Performed by: OBSTETRICS & GYNECOLOGY

## 2023-02-16 PROCEDURE — 3074F SYST BP LT 130 MM HG: CPT | Performed by: OBSTETRICS & GYNECOLOGY

## 2023-02-16 PROCEDURE — G8427 DOCREV CUR MEDS BY ELIG CLIN: HCPCS | Performed by: OBSTETRICS & GYNECOLOGY

## 2023-02-16 PROCEDURE — G8417 CALC BMI ABV UP PARAM F/U: HCPCS | Performed by: OBSTETRICS & GYNECOLOGY

## 2023-02-16 PROCEDURE — 3079F DIAST BP 80-89 MM HG: CPT | Performed by: OBSTETRICS & GYNECOLOGY

## 2023-02-16 PROCEDURE — G8484 FLU IMMUNIZE NO ADMIN: HCPCS | Performed by: OBSTETRICS & GYNECOLOGY

## 2023-02-16 RX ORDER — CHOLECALCIFEROL (VITAMIN D3) 25 MCG
1 TABLET,CHEWABLE ORAL DAILY
Qty: 90 CAPSULE | Refills: 3 | Status: SHIPPED | OUTPATIENT
Start: 2023-02-16

## 2023-02-16 NOTE — PROGRESS NOTES
2/16/23    Vianey Santiago  1980    Chief Complaint   Patient presents with    Follow-up     Pt here to discuss lab reslts, d/t irregular menses. Vianey Santiago is a 44 y.o. female who presents today for evaluation of irregular menses. Past Medical History:   Diagnosis Date    Anemia     Anxiety     Arthritis     \"Left Hip\"    Back pain     Diabetes ([address] Dx 2008    Hiatal hernia     Hyperlipidemia     \"They Put Me On Lipitor Because I'm Diabetic, I Have Never Had High Cholesterol\"    Infertility, female     Pelvic pain     Shoulder pain, left     Sleep apnea     Uses CPAP    Teeth missing     Upper    Type 2 diabetes mellitus without complication ([address]     Wears glasses     Wears partial dentures     Upper       Past Surgical History:   Procedure Laterality Date    BREAST BIOPSY Bilateral 2006    Benign    DENTAL SURGERY      Teeth Extracted In The Past    ENDOSCOPY, COLON, DIAGNOSTIC  2017    HERNIA REPAIR      SLEEVE GASTRECTOMY  09/05/2017    Robotic sleeve gastrectomy and hiatal hernia repair    TUBAL LIGATION  2005    TUBAL LIGATION      reversal       Social History     Tobacco Use    Smoking status: Former     Packs/day: 0.50     Years: 13.00     Pack years: 6.50     Types: Cigarettes     Start date: 1995     Quit date: 2008     Years since quitting: 15.1    Smokeless tobacco: Never   Vaping Use    Vaping Use: Never used   Substance Use Topics    Alcohol use: Not Currently     Comment: \"Occ.  Maybe Once A Year\"    Drug use: No       Family History   Problem Relation Age of Onset    High Blood Pressure Mother     High Cholesterol Mother     Bipolar Disorder Sister     Mental Illness Sister         Bipolar    Diabetes Brother     High Blood Pressure Sister     Stroke Brother     Asthma Daughter        Current Outpatient Medications   Medication Sig Dispense Refill    clomiPHENE (CLOMID) 50 MG tablet Take 2 tablets by mouth daily Days 3-7 10 tablet 5    Prenatal Vit-Fe Sulfate-FA-DHA 44 y/o female admitted with acute on chronic combined CHF.  Started on IV diuresis with improvement of volume status and symptoms.  Cardiology consulted and agreed with continued diuresis.  Echo with EF of 20% and diastolic dysfunction as on previous echo.  Patient remained afebrile and hemodynamically stable.  She did have episode of asymptomatic NSVT during hospital stay.  Patient has AICD in place with no evidence of shocks.  Patient is currently asymptomatic.  Cardiology has cleared her for discharge.  Her home Lasix dose will be increased and her Aldactone dose adjusted.  Patient will be discharged home to follow up with PCP.   (PRENATAL VITAMIN/MIN +DHA) 27-0.8-200 MG CAPS Take 1 tablet by mouth daily (may substitute any prenatal vitamin covered by insurance, ok for prenatal without DHA if DHA based prenatal is not covered) 90 capsule 3    metFORMIN (GLUCOPHAGE) 500 MG tablet Take 500 mg by mouth 2 times daily (with meals)      letrozole (FEMARA) 2.5 MG tablet PLEASE SEE ATTACHED FOR DETAILED DIRECTIONS      Prenatal Vit-Fe Fumarate-FA (PRENATAL VITAMINS) 28-0.8 MG TABS Take 1 tablet by mouth daily 90 tablet 2    ferrous sulfate dried (SLOW FE) 160 (50 Fe) MG TBCR extended release tablet Take 160 mg by mouth 2 times daily 30 tablet 0    vitamin D3 (CHOLECALCIFEROL) 25 MCG (1000 UT) TABS tablet Take 1 tablet by mouth daily 30 tablet 0    nitroGLYCERIN (NITROSTAT) 0.4 MG SL tablet Place 1 tablet under the tongue every 5 minutes as needed for Chest pain (Patient not taking: Reported on 2/2/2023) 25 tablet 3    Multiple Vitamins-Minerals (HM MULTIVITAMIN ADULT GUMMY PO) Take 2 Doses by mouth       No current facility-administered medications for this visit. Allergies   Allergen Reactions    Penicillins Shortness Of Breath    Venofer [Iron Sucrose] Hives and Shortness Of Breath       [id number]      There is no immunization history on file for this patient.     Review of Systems    /83 (Site: Left Upper Arm, Position: Sitting, Cuff Size: Large Adult)   Pulse 62   Ht 5' 7\" (1.702 m)   Wt 258 lb (117 kg)   LMP 02/14/2023   BMI 40.41 kg/m²     Physical Exam    No results found for this visit on 02/16/23.  02/02/2023 1048 02/05/2023 0959 ANTI MULLERIAN HORMONE [[id number]]   Blood    Component Value Units   Anti Mullerian Hormone 1.148  ng/mL          02/02/2023 1048 02/02/2023 2051 HCG Qualitative, Serum [[id number]]   Blood    Component Value   hCG Qual Negative          02/02/2023 1048 54/65/7084 7634 Follicle Stimulating Hormone [[id number]]   Blood    Component Value Units   FSH 8.0  mIU/mL          02/02/2023 1048 02/06/2023 1615 Testosterone, free, total [[id number]]   (Abnormal)   Blood    Component Value Units   Testosterone 17 Low  ng/dL   Sex Hormone Binding 42 nmol/L   Testosterone, Free 2.6  pg/mL          02/02/2023 1048 02/02/2023 2114 TSH with Reflex to FT4 [[id number]]   Blood    Component Value Units   TSH Reflex FT4 3.33 uIU/mL          02/02/2023 1048 02/02/2023 2105 Prolactin [[id number]]   Blood    Component Value Units   Prolactin 11.0  ng/mL            ASSESSMENT AND PLAN   Diagnosis Orders   1. Irregular menses  clomiPHENE (CLOMID) 50 MG tablet    Progesterone    Progesterone          Return in about 6 months (around 8/16/2023).   [facility] at [facility]. [facility] clinic with normal hsg after  Normal SA at [address] [place] [address]    Age related risk of aneuploidy discussed    Risk of ectopic discussed    Risk of clomid including twins and ohss  Brandan Knapp MD

## 2023-02-27 ENCOUNTER — HOSPITAL ENCOUNTER (OUTPATIENT)
Dept: INFUSION THERAPY | Age: 43
Discharge: HOME | End: 2023-02-27
Payer: COMMERCIAL

## 2023-02-27 DIAGNOSIS — K90.9 MALABSORPTION OF IRON: ICD-10-CM

## 2023-02-27 DIAGNOSIS — D50.9 IRON DEFICIENCY ANEMIA, UNSPECIFIED IRON DEFICIENCY ANEMIA TYPE: ICD-10-CM

## 2023-02-27 LAB
25(OH)D3 SERPL-MCNC: 22.89 NG/ML
ALBUMIN SERPL-MCNC: 4.1 GM/DL (ref 3.4–5)
ALP BLD-CCNC: 56 IU/L (ref 40–129)
ALT SERPL-CCNC: 9 U/L (ref 10–40)
ANION GAP SERPL CALCULATED.3IONS-SCNC: 10 MMOL/L (ref 4–16)
AST SERPL-CCNC: 13 IU/L (ref 15–37)
BASOPHILS ABSOLUTE: 0 K/CU MM
BASOPHILS RELATIVE PERCENT: 0.6 % (ref 0–1)
BILIRUB SERPL-MCNC: 0.5 MG/DL (ref 0–1)
BUN SERPL-MCNC: 8 MG/DL (ref 6–23)
CALCIUM SERPL-MCNC: 8.9 MG/DL (ref 8.3–10.6)
CHLORIDE BLD-SCNC: 100 MMOL/L (ref 99–110)
CO2: 25 MMOL/L (ref 21–32)
CREAT SERPL-MCNC: 0.6 MG/DL (ref 0.6–1.1)
DIFFERENTIAL TYPE: ABNORMAL
EOSINOPHILS ABSOLUTE: 0.1 K/CU MM
EOSINOPHILS RELATIVE PERCENT: 2.2 % (ref 0–3)
FERRITIN: 8 NG/ML (ref 15–150)
FOLATE SERPL-MCNC: >20 NG/ML (ref 3.1–17.5)
GFR SERPL CREATININE-BSD FRML MDRD: >60 ML/MIN/1.73M2
GLUCOSE SERPL-MCNC: 86 MG/DL (ref 70–99)
HCT VFR BLD CALC: 32.5 % (ref 37–47)
HEMOGLOBIN: 10.3 GM/DL (ref 12.5–16)
IRON: 21 UG/DL (ref 37–145)
LYMPHOCYTES ABSOLUTE: 2.3 K/CU MM
LYMPHOCYTES RELATIVE PERCENT: 45 % (ref 24–44)
MCH RBC QN AUTO: 24.2 PG (ref 27–31)
MCHC RBC AUTO-ENTMCNC: 31.7 % (ref 32–36)
MCV RBC AUTO: 76.3 FL (ref 78–100)
MONOCYTES ABSOLUTE: 0.4 K/CU MM
MONOCYTES RELATIVE PERCENT: 7.3 % (ref 0–4)
PCT TRANSFERRIN: 6 % (ref 10–44)
PDW BLD-RTO: 17.7 % (ref 11.7–14.9)
PLATELET # BLD: 296 K/CU MM (ref 140–440)
PMV BLD AUTO: 9.4 FL (ref 7.5–11.1)
POTASSIUM SERPL-SCNC: 3.9 MMOL/L (ref 3.5–5.1)
RBC # BLD: 4.26 M/CU MM (ref 4.2–5.4)
SEGMENTED NEUTROPHILS ABSOLUTE COUNT: 2.3 K/CU MM
SEGMENTED NEUTROPHILS RELATIVE PERCENT: 44.9 % (ref 36–66)
SODIUM BLD-SCNC: 135 MMOL/L (ref 135–145)
TOTAL IRON BINDING CAPACITY: 364 UG/DL (ref 250–450)
TOTAL PROTEIN: 6.8 GM/DL (ref 6.4–8.2)
UNSATURATED IRON BINDING CAPACITY: 343 UG/DL (ref 110–370)
VITAMIN B-12: 997.3 PG/ML (ref 211–911)
WBC # BLD: 5.1 K/CU MM (ref 4–10.5)

## 2023-02-27 PROCEDURE — 82728 ASSAY OF FERRITIN: CPT

## 2023-02-27 PROCEDURE — 85025 COMPLETE CBC W/AUTO DIFF WBC: CPT

## 2023-02-27 PROCEDURE — 82306 VITAMIN D 25 HYDROXY: CPT

## 2023-02-27 PROCEDURE — 82746 ASSAY OF FOLIC ACID SERUM: CPT

## 2023-02-27 PROCEDURE — 83540 ASSAY OF IRON: CPT

## 2023-02-27 PROCEDURE — 80053 COMPREHEN METABOLIC PANEL: CPT

## 2023-02-27 PROCEDURE — 36415 COLL VENOUS BLD VENIPUNCTURE: CPT

## 2023-02-27 PROCEDURE — 82607 VITAMIN B-12: CPT

## 2023-02-27 PROCEDURE — 83550 IRON BINDING TEST: CPT

## 2023-03-03 ENCOUNTER — HOSPITAL ENCOUNTER (OUTPATIENT)
Dept: INFUSION THERAPY | Age: 43
Discharge: HOME | End: 2023-03-03
Payer: COMMERCIAL

## 2023-03-03 ENCOUNTER — OFFICE VISIT (OUTPATIENT)
Dept: ONCOLOGY | Age: 43
End: 2023-03-03
Payer: COMMERCIAL

## 2023-03-03 VITALS
SYSTOLIC BLOOD PRESSURE: 107 MMHG | BODY MASS INDEX: 39.24 KG/M2 | DIASTOLIC BLOOD PRESSURE: 68 MMHG | OXYGEN SATURATION: 95 % | TEMPERATURE: 97.5 F | HEART RATE: 69 BPM | WEIGHT: 250 LBS | HEIGHT: 67 IN

## 2023-03-03 DIAGNOSIS — D50.0 IRON DEFICIENCY ANEMIA SECONDARY TO BLOOD LOSS (CHRONIC): ICD-10-CM

## 2023-03-03 DIAGNOSIS — K90.9 MALABSORPTION OF IRON: Primary | ICD-10-CM

## 2023-03-03 DIAGNOSIS — K90.49 MALABSORPTION DUE TO INTOLERANCE, NOT ELSEWHERE CLASSIFIED: ICD-10-CM

## 2023-03-03 DIAGNOSIS — D50.9 IRON DEFICIENCY ANEMIA, UNSPECIFIED IRON DEFICIENCY ANEMIA TYPE: ICD-10-CM

## 2023-03-03 PROCEDURE — 3078F DIAST BP <80 MM HG: CPT | Performed by: NURSE PRACTITIONER

## 2023-03-03 PROCEDURE — G8484 FLU IMMUNIZE NO ADMIN: HCPCS | Performed by: NURSE PRACTITIONER

## 2023-03-03 PROCEDURE — 3074F SYST BP LT 130 MM HG: CPT | Performed by: NURSE PRACTITIONER

## 2023-03-03 PROCEDURE — 1036F TOBACCO NON-USER: CPT | Performed by: NURSE PRACTITIONER

## 2023-03-03 PROCEDURE — 99211 OFF/OP EST MAY X REQ PHY/QHP: CPT

## 2023-03-03 PROCEDURE — 99214 OFFICE O/P EST MOD 30 MIN: CPT | Performed by: NURSE PRACTITIONER

## 2023-03-03 PROCEDURE — G8427 DOCREV CUR MEDS BY ELIG CLIN: HCPCS | Performed by: NURSE PRACTITIONER

## 2023-03-03 PROCEDURE — G8417 CALC BMI ABV UP PARAM F/U: HCPCS | Performed by: NURSE PRACTITIONER

## 2023-03-03 RX ORDER — ONDANSETRON 2 MG/ML
8 INJECTION INTRAMUSCULAR; INTRAVENOUS
Status: CANCELLED | OUTPATIENT
Start: 2023-03-10

## 2023-03-03 RX ORDER — ALBUTEROL SULFATE 90 UG/1
4 AEROSOL, METERED RESPIRATORY (INHALATION) PRN
Status: CANCELLED | OUTPATIENT
Start: 2023-03-10

## 2023-03-03 RX ORDER — SODIUM CHLORIDE 9 MG/ML
5-250 INJECTION, SOLUTION INTRAVENOUS PRN
Status: CANCELLED | OUTPATIENT
Start: 2023-03-10

## 2023-03-03 RX ORDER — SODIUM CHLORIDE 9 MG/ML
INJECTION, SOLUTION INTRAVENOUS CONTINUOUS
Status: CANCELLED | OUTPATIENT
Start: 2023-03-10

## 2023-03-03 RX ORDER — DIPHENHYDRAMINE HYDROCHLORIDE 50 MG/ML
50 INJECTION INTRAMUSCULAR; INTRAVENOUS
OUTPATIENT
Start: 2023-03-10

## 2023-03-03 RX ORDER — FAMOTIDINE 10 MG/ML
20 INJECTION, SOLUTION INTRAVENOUS
OUTPATIENT
Start: 2023-03-10

## 2023-03-03 RX ORDER — ACETAMINOPHEN 325 MG/1
650 TABLET ORAL
Status: CANCELLED | OUTPATIENT
Start: 2023-03-10

## 2023-03-03 RX ORDER — HEPARIN SODIUM (PORCINE) LOCK FLUSH IV SOLN 100 UNIT/ML 100 UNIT/ML
500 SOLUTION INTRAVENOUS PRN
OUTPATIENT
Start: 2023-03-10

## 2023-03-03 RX ORDER — SODIUM CHLORIDE 9 MG/ML
INJECTION, SOLUTION INTRAVENOUS CONTINUOUS
OUTPATIENT
Start: 2023-03-10

## 2023-03-03 RX ORDER — EPINEPHRINE 1 MG/ML
0.3 INJECTION, SOLUTION, CONCENTRATE INTRAVENOUS PRN
Status: CANCELLED | OUTPATIENT
Start: 2023-03-10

## 2023-03-03 RX ORDER — SODIUM CHLORIDE 9 MG/ML
5-250 INJECTION, SOLUTION INTRAVENOUS PRN
OUTPATIENT
Start: 2023-03-10

## 2023-03-03 RX ORDER — ALBUTEROL SULFATE 90 UG/1
4 AEROSOL, METERED RESPIRATORY (INHALATION) PRN
OUTPATIENT
Start: 2023-03-10

## 2023-03-03 RX ORDER — DIPHENHYDRAMINE HYDROCHLORIDE 50 MG/ML
50 INJECTION INTRAMUSCULAR; INTRAVENOUS
Status: CANCELLED | OUTPATIENT
Start: 2023-03-10

## 2023-03-03 RX ORDER — ACETAMINOPHEN 325 MG/1
650 TABLET ORAL
OUTPATIENT
Start: 2023-03-10

## 2023-03-03 RX ORDER — SODIUM CHLORIDE 0.9 % (FLUSH) 0.9 %
5-40 SYRINGE (ML) INJECTION PRN
Status: CANCELLED | OUTPATIENT
Start: 2023-03-10

## 2023-03-03 RX ORDER — EPINEPHRINE 1 MG/ML
0.3 INJECTION, SOLUTION, CONCENTRATE INTRAVENOUS PRN
OUTPATIENT
Start: 2023-03-10

## 2023-03-03 RX ORDER — FAMOTIDINE 10 MG/ML
20 INJECTION, SOLUTION INTRAVENOUS
Status: CANCELLED | OUTPATIENT
Start: 2023-03-10

## 2023-03-03 RX ORDER — HEPARIN SODIUM (PORCINE) LOCK FLUSH IV SOLN 100 UNIT/ML 100 UNIT/ML
500 SOLUTION INTRAVENOUS PRN
Status: CANCELLED | OUTPATIENT
Start: 2023-03-10

## 2023-03-03 RX ORDER — ONDANSETRON 2 MG/ML
8 INJECTION INTRAMUSCULAR; INTRAVENOUS
OUTPATIENT
Start: 2023-03-10

## 2023-03-03 RX ORDER — SODIUM CHLORIDE 0.9 % (FLUSH) 0.9 %
5-40 SYRINGE (ML) INJECTION PRN
OUTPATIENT
Start: 2023-03-10

## 2023-03-03 NOTE — PROGRESS NOTES
MA Rooming Questions  Patient: Amado Begum  MRN: [id number]    Date: 3/3/2023        1. Do you have any new issues?   no         2. Do you need any refills on medications?    no    3. Have you had any imaging done since your last visit?   no    4. Have you been hospitalized or seen in the emergency room since your last visit here?   no    5. Did the patient have a depression screening completed today?  No    No data recorded     PHQ-9 Given to (if applicable):               PHQ-9 Score (if applicable):                     [] Positive     []  Negative              Does question #9 need addressed (if applicable)                     [] Yes    []  No               Major Gist, CMA

## 2023-03-03 NOTE — PROGRESS NOTES
Patient Name: Urbano Bourne  Patient : 1980  Patient MRN: [id number]     Primary Oncologist: Ginny Louis MD  PCP: Brenda Yang     Date of Service: 3/3/2023      Chief Complaint:   Chief Complaint   Patient presents with    Follow-up       Active Problem list  1. Malabsorption of iron    2. Iron deficiency anemia, unspecified iron deficiency anemia type    3. Malabsorption due to intolerance, not elsewhere classified         HPI:        3/11/19: Arrived alone to the clinic. Reported that she had the gastric sleeve in 2017, lost 45 lbs. Reported that her menstrual periods are heavy at times, associated with a lot of clots, would use 8-10 pads per day for the first three days and then lighter for the next 3-4 days. No other overt bleeding. Reported fatigue. CANDICE sx. reported that she has a craving to smell alcohol pads. Intermittent chest pain. Has been evaluated and was told that she has reflux Sx. Denied any sob. No palpitattions, dizziness. Intermittent lower abdominal pain, but denied any nausea, emesis, constipation or any diarrhea. Denied any  sx. No fevr, night sweats, weight loss, lad. No vision changes, focal weakness. Reported intermittent ha, associated with smelling of alcohol pads. No dysphagia, odynophagia. STNA at acute rehab.  3/4/19: CBC with wbc 5.5, normal diff, Hb 10.1, Hct 41, mcv 75, platelets 199  CMP wnl  Ferritin 5 and iron sats 5%    3/17/2019 CT scan of the abdomen pelvis without contrast revealed nonobstructing 2 mm left renal calculus. Liver spleen pancreas and bilateral adrenal glands are normal. Gallbladder is near completely collapsed. Postsurgical changes of prior gastric sleeve. No suspicious osteolytic or osteoblastic lesions.      Received Ferriheme  CBC with wbc 3.7,anc 1800, Hb 8.8, Hct 29, mcv 69, paltelets 294, ferritin 6, iron sats 4%, b12 451, folate 16, Vitamin D 30    Received Infed 2021     US RP and abd ordered for tomorrow 9/30/21 11/2021 Ultrasound:  Normal uterus with an endometrial thickness of 2.68 cm, and normal    ovaries. On October 6, 2021 the endometrial thickness was 6.2 mm and the    left ovarian cyst seen on this ultrasound in October are now resolved. The full ultrasound report is located in the media tab for further    details. October 2021 Mammogram:  No mammographic evidence of malignancy. BIRADS:   BIRADS - CATEGORY 1       Negative, no evidence of malignancy. Normal interval follow-up is   recommended in 12 months. OVERALL ASSESSMENT - NEGATIVE       A letter of notification will be sent to the patient regarding the results. Received  parenteral iron July 2022 2/27/23 labs WBC: 5.1,Hemoglobin 10.3, Hematocrit: 32.5 MCV: 76.3   Platelet Count: 349 Ferritin: 8 Iron: 21 TIBC: 364 Transferrin %: 6   FOLATE, FOLAT: >20.0  Vitamin B-12: 997.3     Past Medical History  Prediabetes  Anxiety disorder in the past    Surgical History  Procedure: Laparoscopic adjustable gastric banding (procedure)   Procedure: Ligation of fallopian tube (procedure)   Procedure: Non-surgical breast biopsy (procedure)    Allergies  No known medication allergies    Medications  Ferrous Sulfate Oral 325 mg (65 mg iron) tablet   Vitamin C (Ascorbic Acid Oral)    Social History  Lives with family  Works at [employer] as [employer] En[employer]  Has 6 living children  Quit smoking in 2007, prior to which she smoked 1 pp per two days for on and off for 15 years  No excess etoh or any other illicit drug abuse    Family History  Mother: Hypertension   Brother 1: Cerebrovascular accident, Diabetes   Grandmother - Maternal (2nd Degree): Cerebrovascular accident, Diabetes    No fho of leukemia, lymphoma, naemia, any other blood dyscreasias  No h/o malignancy in the family    Interval History  3/3/2023: Presented alone to clinic today. She reports ongoing heavy menstrual bleeding every 28 days.  However, report of irregular menses per GYN two days ago, started on clomid. Trying to conceive, advised to continue folic acid. Reports compliance with slow FE. Denies Weakness, Fatigue, ice chewing, pica, restless legs, shortness of breath, dizziness, chest pain, palpitations, hematochezia. , hematuria, hemoptysis, melena       Review of Systems   Per interval history; otherwise 10 point ROS is negative              Vital Signs:  /68 (Site: Right Upper Arm, Position: Sitting, Cuff Size: Large Adult)   Pulse 69   Temp 97.5 °F (36.4 °C) (Infrared)   Ht 5' 7\" (1.702 m)   Wt 250 lb (113.4 kg)   LMP 02/14/2023   SpO2 95%   BMI 39.16 kg/m²     Physical Exam:    CONSTITUTIONAL: awake, alert, cooperative, NAD  EYES: No icterus or pallor  ENT: Normocephalic, without obvious abnormality, atraumatic. NECK: supple, symmetrical   HEMATOLOGIC/LYMPHATIC: no cervical, supraclavicular or axillary lymphadenopathy   LUNGS: no increased work of breathing and clear to auscultation b/l  CARDIOVASCULAR: regular rate and rhythm, normal S1 and S2, no murmur noted  ABDOMEN: normal bowel sounds, soft, non-distended, non-tender, no palpable masses, no rebound or guarding. MUSCULOSKELETAL: ambulating independently, normal tone  NEUROLOGIC: CN II - XII grossly intact. SKIN: No jaundice. Dry skin. No ecchymosis. EXTREMITIES: no edema or cyanosis.      Labs:    Hematology:  Lab Results   Component Value Date    WBC 5.1 02/27/2023    RBC 4.26 02/27/2023    HGB 10.3 (L) 02/27/2023    HCT 32.5 (L) 02/27/2023    MCV 76.3 (L) 02/27/2023    MCH 24.2 (L) 02/27/2023    MCHC 31.7 (L) 02/27/2023    RDW 17.7 (H) 02/27/2023     02/27/2023    MPV 9.4 02/27/2023    SEGSPCT 44.9 02/27/2023    EOSRELPCT 2.2 02/27/2023    BASOPCT 0.6 02/27/2023    LYMPHOPCT 45.0 (H) 02/27/2023    MONOPCT 7.3 (H) 02/27/2023    SEGSABS 2.3 02/27/2023    EOSABS 0.1 02/27/2023    BASOSABS 0.0 02/27/2023    LYMPHSABS 2.3 02/27/2023    MONOSABS 0.4 02/27/2023    DIFFTYPE AUTOMATED DIFFERENTIAL 02/27/2023     Lab Results   Component Value Date    ESR 11 03/11/2019       Chemistry:  Lab Results   Component Value Date     02/27/2023    K 3.9 02/27/2023     02/27/2023    CO2 25 02/27/2023    BUN 8 02/27/2023    CREATININE 0.6 02/27/2023    GLUCOSE 86 02/27/2023    CALCIUM 8.9 02/27/2023    PROT 6.8 02/27/2023    LABALBU 4.1 02/27/2023    BILITOT 0.5 02/27/2023    ALKPHOS 56 02/27/2023    AST 13 (L) 02/27/2023    ALT 9 (L) 02/27/2023    LABGLOM >60 02/27/2023    GFRAA >60 09/19/2022    MG 1.9 02/09/2018    POCGLU 71 09/06/2017     Lab Results   Component Value Date     03/11/2019     No components found for: LD  Lab Results   Component Value Date    TSHHS 1.530 03/11/2019       Immunology:  Lab Results   Component Value Date    PROT 6.8 02/27/2023    SPEP INTERPRETATION - Within normal limits. RS 03/11/2019    SPEP INTERPRETATION - Within normal limits. RS 03/11/2019    ALBUMINELP 3.9 03/11/2019    LABALPH 0.3 03/11/2019    LABALPH 0.6 03/11/2019    LABBETA 1.2 03/11/2019    GAMGLOB 1.5 03/11/2019     No results found for: Guinevere Iglesia, KLFLCR  No results found for: B2M    Coagulation Panel:  Lab Results   Component Value Date    DDIMER <200 05/21/2016       Anemia Panel:  Lab Results   Component Value Date    Dante Gerry 997.3 (H) 02/27/2023    FOLATE >20.0 (H) 02/27/2023       Tumor Markers:  No results found for: , CEA, , LABCA2, PSA       Observations:  Performance Status: ECOG 0  Depression Status:      Assessment & Plan: Crystal Ware female s/p gastric sleeve referred for anemia    Anemia: iron indices, microcytosis and ferritin were  suggestive of thi. Most probably secondary to malabsorption from gastric sleeve and also note menorrhagia. No evidence of other blood loss, other nutritional def, hemolysis, monoclonal gammopathy, thyroid dys, def of microelements. Has received parenteral iron in July 2022,  is on SlowFe bid, advised her to take every other day.  Continue Vitamin D supplements.   Hb 10.9 and ferritin 29. Anemia could also be sec to other mineral def. But discussed further evaluation with BMB/aspiration if anemia persists even after adequate iron replacement. Follows with Gyn. Recommended GI evaluation in the past. Referred to GI today. 2/23 iron studies consistent with iron deficiency. Plan for parenteral iron.     Menorrhagia: Is being followed by Gyn. Holding off on any ablation as she plans to get pregnant.  Was on letrozole but has been discontinued. Started on Clomid 3/23    HTN: Recommend that she maintains a log and discusses with PCP if remains elevated, recommend low salt diet, weight loss if possible and exercise.  BP better controlled      Recommend vitamin D supplements.     Continue other medical care    Discussed the above findings and plan with her and she verbalized understanding    Discussed healthy lifestyle, regular exercise and also weight loss. Discussed the importance of being uptodate with age appropriate screening tools.   Mammogram October 2021 normal, due 2022     RTC  May 2023  or earlier if new Sx

## 2023-03-06 ENCOUNTER — TELEPHONE (OUTPATIENT)
Dept: GASTROENTEROLOGY | Age: 43
End: 2023-03-06

## 2023-03-06 NOTE — TELEPHONE ENCOUNTER
Called pt. In regards to a referral for malabsorption and iron deficiency. Lm for pt to call back to make an appt. History/Exam/Medical Decision Making

## 2023-03-07 ENCOUNTER — NURSE ONLY (OUTPATIENT)
Dept: OBGYN | Age: 43
End: 2023-03-07
Payer: COMMERCIAL

## 2023-03-07 DIAGNOSIS — N92.6 IRREGULAR MENSES: ICD-10-CM

## 2023-03-07 LAB — PROGESTERONE LEVEL: 15.52 NG/ML

## 2023-03-07 PROCEDURE — 36415 COLL VENOUS BLD VENIPUNCTURE: CPT | Performed by: OBSTETRICS & GYNECOLOGY

## 2023-03-08 ENCOUNTER — NURSE ONLY (OUTPATIENT)
Dept: OBGYN | Age: 43
End: 2023-03-08
Payer: COMMERCIAL

## 2023-03-08 DIAGNOSIS — N92.6 IRREGULAR MENSES: Primary | ICD-10-CM

## 2023-03-08 LAB — PROGESTERONE LEVEL: 16.83 NG/ML

## 2023-03-08 PROCEDURE — 36415 COLL VENOUS BLD VENIPUNCTURE: CPT | Performed by: OBSTETRICS & GYNECOLOGY

## 2023-03-20 ENCOUNTER — TELEPHONE (OUTPATIENT)
Dept: CARDIOLOGY CLINIC | Age: 43
End: 2023-03-20

## 2023-03-20 ENCOUNTER — OFFICE VISIT (OUTPATIENT)
Dept: FAMILY MEDICINE CLINIC | Age: 43
End: 2023-03-20
Payer: COMMERCIAL

## 2023-03-20 VITALS
HEIGHT: 67 IN | TEMPERATURE: 97.3 F | RESPIRATION RATE: 18 BRPM | DIASTOLIC BLOOD PRESSURE: 68 MMHG | HEART RATE: 72 BPM | WEIGHT: 251 LBS | SYSTOLIC BLOOD PRESSURE: 112 MMHG | BODY MASS INDEX: 39.39 KG/M2 | OXYGEN SATURATION: 96 %

## 2023-03-20 DIAGNOSIS — D50.9 IRON DEFICIENCY ANEMIA, UNSPECIFIED IRON DEFICIENCY ANEMIA TYPE: ICD-10-CM

## 2023-03-20 DIAGNOSIS — R07.9 CHEST PAIN ON EXERTION: ICD-10-CM

## 2023-03-20 DIAGNOSIS — I10 HTN, GOAL BELOW 140/90: ICD-10-CM

## 2023-03-20 DIAGNOSIS — Z76.89 ENCOUNTER TO ESTABLISH CARE: Primary | ICD-10-CM

## 2023-03-20 DIAGNOSIS — E11.9 TYPE II DIABETES MELLITUS WITH HBA1C GOAL TO BE DETERMINED (HCC): ICD-10-CM

## 2023-03-20 LAB — HBA1C MFR BLD: 6.1 %

## 2023-03-20 PROCEDURE — 3078F DIAST BP <80 MM HG: CPT | Performed by: NURSE PRACTITIONER

## 2023-03-20 PROCEDURE — 1036F TOBACCO NON-USER: CPT | Performed by: NURSE PRACTITIONER

## 2023-03-20 PROCEDURE — 2022F DILAT RTA XM EVC RTNOPTHY: CPT | Performed by: NURSE PRACTITIONER

## 2023-03-20 PROCEDURE — 3044F HG A1C LEVEL LT 7.0%: CPT | Performed by: NURSE PRACTITIONER

## 2023-03-20 PROCEDURE — 99203 OFFICE O/P NEW LOW 30 MIN: CPT | Performed by: NURSE PRACTITIONER

## 2023-03-20 PROCEDURE — 3074F SYST BP LT 130 MM HG: CPT | Performed by: NURSE PRACTITIONER

## 2023-03-20 PROCEDURE — 83036 HEMOGLOBIN GLYCOSYLATED A1C: CPT | Performed by: NURSE PRACTITIONER

## 2023-03-20 PROCEDURE — G8427 DOCREV CUR MEDS BY ELIG CLIN: HCPCS | Performed by: NURSE PRACTITIONER

## 2023-03-20 PROCEDURE — G8484 FLU IMMUNIZE NO ADMIN: HCPCS | Performed by: NURSE PRACTITIONER

## 2023-03-20 PROCEDURE — G8417 CALC BMI ABV UP PARAM F/U: HCPCS | Performed by: NURSE PRACTITIONER

## 2023-03-20 SDOH — ECONOMIC STABILITY: INCOME INSECURITY: HOW HARD IS IT FOR YOU TO PAY FOR THE VERY BASICS LIKE FOOD, HOUSING, MEDICAL CARE, AND HEATING?: NOT HARD AT ALL

## 2023-03-20 SDOH — ECONOMIC STABILITY: FOOD INSECURITY: WITHIN THE PAST 12 MONTHS, YOU WORRIED THAT YOUR FOOD WOULD RUN OUT BEFORE YOU GOT MONEY TO BUY MORE.: NEVER TRUE

## 2023-03-20 SDOH — ECONOMIC STABILITY: FOOD INSECURITY: WITHIN THE PAST 12 MONTHS, THE FOOD YOU BOUGHT JUST DIDN'T LAST AND YOU DIDN'T HAVE MONEY TO GET MORE.: NEVER TRUE

## 2023-03-20 ASSESSMENT — ENCOUNTER SYMPTOMS
WHEEZING: 0
CHEST TIGHTNESS: 0
GASTROINTESTINAL NEGATIVE: 1
SHORTNESS OF BREATH: 0
COUGH: 0

## 2023-03-20 ASSESSMENT — PATIENT HEALTH QUESTIONNAIRE - PHQ9
SUM OF ALL RESPONSES TO PHQ QUESTIONS 1-9: 0
SUM OF ALL RESPONSES TO PHQ QUESTIONS 1-9: 0
1. LITTLE INTEREST OR PLEASURE IN DOING THINGS: 0
SUM OF ALL RESPONSES TO PHQ QUESTIONS 1-9: 0
SUM OF ALL RESPONSES TO PHQ QUESTIONS 1-9: 0
2. FEELING DOWN, DEPRESSED OR HOPELESS: 0
SUM OF ALL RESPONSES TO PHQ9 QUESTIONS 1 & 2: 0

## 2023-03-20 NOTE — PROGRESS NOTES
02/27/2023 01:07 PM    EOSABS 0.1 02/27/2023 01:07 PM    BASOSABS 0.0 02/27/2023 01:07 PM     Lab Results   Component Value Date/Time    TSHHS 1.530 03/11/2019 10:38 AM     Lab Results   Component Value Date/Time    LABALBU 4.1 02/27/2023 01:07 PM    BILITOT 0.5 02/27/2023 01:07 PM    BILIDIR 0.2 02/09/2018 08:01 AM    IBILI 0.3 02/09/2018 08:01 AM    AST 13 02/27/2023 01:07 PM    ALT 9 02/27/2023 01:07 PM    ALKPHOS 56 02/27/2023 01:07 PM             Results for orders placed or performed in visit on 03/20/23   POCT glycosylated hemoglobin (Hb A1C)   Result Value Ref Range    Hemoglobin A1C 6.1 %       ASSESSMENT AND PLAN:     1. Encounter to establish care    2. HTN, goal below 140/90  - blood pressure stable    3. Type II diabetes mellitus with HbA1C goal to be determined ([facility]  - at goal, continue current medication  - POCT glycosylated hemoglobin (Hb A1C)    4. Chest pain on exertion  - Giorgio Auguste MD, Cardiology, [place]    5. Iron deficiency anemia, unspecified iron deficiency anemia type  - managed by hematology, keep appointments    Keep appointments with GYN. for fertility issues. Return in about 3 months (around 6/20/2023) for DM, HgA1C. Care discussed with patient. Patient educated on signs and symptoms of exacerbation and when to seek further medical attention. Advised to call for any problems, questions, or concerns. Patient verbalizes understanding and agrees with plan. Medications reviewed and reconciled. Continue current medications. Appropriate prescriptions are ordered. Risks and benefits of meds are discussed. After visit summary provided.

## 2023-03-22 ENCOUNTER — TELEPHONE (OUTPATIENT)
Dept: CARDIOLOGY CLINIC | Age: 43
End: 2023-03-22

## 2023-03-27 ENCOUNTER — OFFICE VISIT (OUTPATIENT)
Dept: CARDIOLOGY CLINIC | Age: 43
End: 2023-03-27
Payer: COMMERCIAL

## 2023-03-27 ENCOUNTER — TELEPHONE (OUTPATIENT)
Dept: ONCOLOGY | Age: 43
End: 2023-03-27

## 2023-03-27 VITALS
WEIGHT: 251.6 LBS | HEART RATE: 76 BPM | DIASTOLIC BLOOD PRESSURE: 60 MMHG | BODY MASS INDEX: 39.49 KG/M2 | OXYGEN SATURATION: 97 % | HEIGHT: 67 IN | SYSTOLIC BLOOD PRESSURE: 100 MMHG

## 2023-03-27 DIAGNOSIS — Z98.84 STATUS POST BARIATRIC SURGERY: ICD-10-CM

## 2023-03-27 DIAGNOSIS — M25.512 LEFT SHOULDER PAIN, UNSPECIFIED CHRONICITY: ICD-10-CM

## 2023-03-27 DIAGNOSIS — I10 HTN, GOAL BELOW 140/90: ICD-10-CM

## 2023-03-27 DIAGNOSIS — G47.33 MODERATE OBSTRUCTIVE SLEEP APNEA: ICD-10-CM

## 2023-03-27 DIAGNOSIS — G47.33 OSA (OBSTRUCTIVE SLEEP APNEA): ICD-10-CM

## 2023-03-27 DIAGNOSIS — K44.9 HIATAL HERNIA: ICD-10-CM

## 2023-03-27 DIAGNOSIS — Z82.49 FAMILY HISTORY OF DVT: Primary | ICD-10-CM

## 2023-03-27 DIAGNOSIS — D50.0 IRON DEFICIENCY ANEMIA SECONDARY TO BLOOD LOSS (CHRONIC): ICD-10-CM

## 2023-03-27 DIAGNOSIS — R07.2 PRECORDIAL PAIN: ICD-10-CM

## 2023-03-27 PROCEDURE — 3074F SYST BP LT 130 MM HG: CPT | Performed by: INTERNAL MEDICINE

## 2023-03-27 PROCEDURE — 3078F DIAST BP <80 MM HG: CPT | Performed by: INTERNAL MEDICINE

## 2023-03-27 PROCEDURE — 93000 ELECTROCARDIOGRAM COMPLETE: CPT | Performed by: INTERNAL MEDICINE

## 2023-03-27 PROCEDURE — G8484 FLU IMMUNIZE NO ADMIN: HCPCS | Performed by: INTERNAL MEDICINE

## 2023-03-27 PROCEDURE — 1036F TOBACCO NON-USER: CPT | Performed by: INTERNAL MEDICINE

## 2023-03-27 PROCEDURE — 99214 OFFICE O/P EST MOD 30 MIN: CPT | Performed by: INTERNAL MEDICINE

## 2023-03-27 PROCEDURE — G8427 DOCREV CUR MEDS BY ELIG CLIN: HCPCS | Performed by: INTERNAL MEDICINE

## 2023-03-27 PROCEDURE — G8417 CALC BMI ABV UP PARAM F/U: HCPCS | Performed by: INTERNAL MEDICINE

## 2023-03-27 RX ORDER — NITROGLYCERIN 0.4 MG/1
0.4 TABLET SUBLINGUAL EVERY 5 MIN PRN
Qty: 25 TABLET | Refills: 3 | Status: SHIPPED | OUTPATIENT
Start: 2023-03-27

## 2023-03-27 NOTE — PROGRESS NOTES
3cups coffee  day        Review of Systems:   Constitutional: No Fever or Weight Loss   Eyes: No Decreased Vision  ENT: No Headaches, Hearing Loss or Vertigo  Cardiovascular: as per note above   Respiratory: No cough or wheezing and as per note above. Gastrointestinal: No abdominal pain, appetite loss, blood in stools, constipation, diarrhea or heartburn  Genitourinary: No dysuria, trouble voiding, or hematuria  Musculoskeletal:  denies any new  joint aches , swelling  or pain   Integumentary: No rash or pruritis  Neurological: No TIA or stroke symptoms  Psychiatric: No anxiety or depression  Endocrine: No malaise, fatigue or temperature intolerance  Hematologic/Lymphatic: No bleeding problems, blood clots or swollen lymph nodes  Allergic/Immunologic: No nasal congestion or hives    Objective:      Physical Exam:  /60 (Site: Left Upper Arm)   Pulse 76   Ht 5' 7\" (1.702 m)   Wt 251 lb 9.6 oz (114.1 kg)   LMP 03/14/2023 (Exact Date)   SpO2 97%   BMI 39.41 kg/m²   Wt Readings from Last 3 Encounters:   03/27/23 251 lb 9.6 oz (114.1 kg)   03/20/23 251 lb (113.9 kg)   03/03/23 250 lb (113.4 kg)     Body mass index is 39.41 kg/m². Vitals:    03/27/23 1324   BP: 100/60   Pulse:    SpO2:         General Appearance:  No distress, conversant  Constitutional:  Well developed, Well nourished, No acute distress, Non-toxic appearance. HENT:  Normocephalic, Atraumatic, Bilateral external ears normal, Oropharynx moist, No oral exudates, Nose normal. Neck- Normal range of motion, No tenderness, Supple, No stridor,no apical-carotid delay  Eyes:  PERRL, EOMI, Conjunctiva normal, No discharge. Respiratory:  Normal breath sounds, No respiratory distress, No wheezing, No chest tenderness. ,no use of accessory muscles, NO crackles  Cardiovascular: (PMI) apex non displaced,no lifts no thrills,S1 and S2 audible, No added heart sounds, No signs of ankle edema, or volume overload, No evidence of JVD, No crackles  GI:  Bowel

## 2023-03-27 NOTE — TELEPHONE ENCOUNTER
Patient left a message on Friday, 03/24 inquiring about her iron infusion, I just spoke to the patient and she just notified me that her insurance is changing as of April 1st, so we will have to start this process all over again with her new insurance. I advised her to bring her information into the office so it could be entered into the system so we can start the new prior authorization.  If this did get approved there is no way we could get both infusions in before the end of this week, patient states understanding

## 2023-04-03 ENCOUNTER — NURSE ONLY (OUTPATIENT)
Dept: OBGYN | Age: 43
End: 2023-04-03
Payer: COMMERCIAL

## 2023-04-03 DIAGNOSIS — N92.6 IRREGULAR MENSES: Primary | ICD-10-CM

## 2023-04-03 LAB
CHOLEST SERPL-MCNC: 160 MG/DL (ref 0–199)
HDLC SERPL-MCNC: 50 MG/DL (ref 40–60)
LDLC SERPL CALC-MCNC: 88 MG/DL
PROGEST SERPL-MCNC: 23.34 NG/ML
TRIGL SERPL-MCNC: 111 MG/DL (ref 0–150)
VLDLC SERPL CALC-MCNC: 22 MG/DL

## 2023-04-03 PROCEDURE — 36415 COLL VENOUS BLD VENIPUNCTURE: CPT | Performed by: OBSTETRICS & GYNECOLOGY

## 2023-04-05 ENCOUNTER — TELEPHONE (OUTPATIENT)
Dept: ONCOLOGY | Age: 43
End: 2023-04-05

## 2023-04-05 ENCOUNTER — NURSE ONLY (OUTPATIENT)
Dept: OBGYN | Age: 43
End: 2023-04-05
Payer: COMMERCIAL

## 2023-04-05 DIAGNOSIS — N92.6 IRREGULAR MENSES: Primary | ICD-10-CM

## 2023-04-05 LAB — PROGEST SERPL-MCNC: 15.3 NG/ML

## 2023-04-05 PROCEDURE — 36415 COLL VENOUS BLD VENIPUNCTURE: CPT | Performed by: OBSTETRICS & GYNECOLOGY

## 2023-04-05 NOTE — TELEPHONE ENCOUNTER
Tried calling patient to see if she could come in this week to have iron studies + CBC drawn d/t the others are out of the 30 day range for PA, no answer, had to leave a VM to call me back at my direct number

## 2023-04-12 ENCOUNTER — HOSPITAL ENCOUNTER (OUTPATIENT)
Dept: INFUSION THERAPY | Age: 43
Discharge: HOME | End: 2023-04-12
Payer: COMMERCIAL

## 2023-04-12 DIAGNOSIS — K90.49 MALABSORPTION DUE TO INTOLERANCE, NOT ELSEWHERE CLASSIFIED: ICD-10-CM

## 2023-04-12 DIAGNOSIS — D50.9 IRON DEFICIENCY ANEMIA, UNSPECIFIED IRON DEFICIENCY ANEMIA TYPE: ICD-10-CM

## 2023-04-12 DIAGNOSIS — K90.9 MALABSORPTION OF IRON: ICD-10-CM

## 2023-04-12 LAB
BASOPHILS ABSOLUTE: 0 K/CU MM
BASOPHILS RELATIVE PERCENT: 0.8 % (ref 0–1)
DIFFERENTIAL TYPE: ABNORMAL
EOSINOPHILS ABSOLUTE: 0.1 K/CU MM
EOSINOPHILS RELATIVE PERCENT: 2.8 % (ref 0–3)
FERRITIN: 5 NG/ML (ref 15–150)
FOLATE SERPL-MCNC: 16.9 NG/ML (ref 3.1–17.5)
HCT VFR BLD CALC: 31.6 % (ref 37–47)
HEMOGLOBIN: 9.5 GM/DL (ref 12.5–16)
IRON: 21 UG/DL (ref 37–145)
LYMPHOCYTES ABSOLUTE: 1.8 K/CU MM
LYMPHOCYTES RELATIVE PERCENT: 35.1 % (ref 24–44)
MCH RBC QN AUTO: 22.9 PG (ref 27–31)
MCHC RBC AUTO-ENTMCNC: 30.1 % (ref 32–36)
MCV RBC AUTO: 76.1 FL (ref 78–100)
MONOCYTES ABSOLUTE: 0.4 K/CU MM
MONOCYTES RELATIVE PERCENT: 8.7 % (ref 0–4)
PCT TRANSFERRIN: 5 % (ref 10–44)
PDW BLD-RTO: 16.9 % (ref 11.7–14.9)
PLATELET # BLD: ADEQUATE K/CU MM (ref 140–440)
PLT MORPHOLOGY: ABNORMAL
PMV BLD AUTO: ABNORMAL FL (ref 7.5–11.1)
RBC # BLD: 4.15 M/CU MM (ref 4.2–5.4)
SEGMENTED NEUTROPHILS ABSOLUTE COUNT: 2.6 K/CU MM
SEGMENTED NEUTROPHILS RELATIVE PERCENT: 52.6 % (ref 36–66)
TOTAL IRON BINDING CAPACITY: 410 UG/DL (ref 250–450)
UNSATURATED IRON BINDING CAPACITY: 389 UG/DL (ref 110–370)
VITAMIN B-12: 749.2 PG/ML (ref 211–911)
WBC # BLD: 5 K/CU MM (ref 4–10.5)

## 2023-04-12 PROCEDURE — 85025 COMPLETE CBC W/AUTO DIFF WBC: CPT

## 2023-04-12 PROCEDURE — 36415 COLL VENOUS BLD VENIPUNCTURE: CPT

## 2023-04-12 PROCEDURE — 82607 VITAMIN B-12: CPT

## 2023-04-12 PROCEDURE — 83550 IRON BINDING TEST: CPT

## 2023-04-12 PROCEDURE — 82746 ASSAY OF FOLIC ACID SERUM: CPT

## 2023-04-12 PROCEDURE — 83540 ASSAY OF IRON: CPT

## 2023-04-12 PROCEDURE — 82728 ASSAY OF FERRITIN: CPT

## 2023-04-13 ENCOUNTER — TELEPHONE (OUTPATIENT)
Dept: CARDIOLOGY CLINIC | Age: 43
End: 2023-04-13

## 2023-04-18 ENCOUNTER — TELEPHONE (OUTPATIENT)
Dept: ONCOLOGY | Age: 43
End: 2023-04-18

## 2023-04-18 NOTE — TELEPHONE ENCOUNTER
Called patient to schedule iron infusion, patient is scheduled for Mon. 04/24 and 05/01 @ 0815, advised patient to notify us asap if unable to keep their appt time and if appt is No Showed or No Call, rescheduled appt may take up to 3 months to schedule, also advised that 1 visitor allowed at time of infusion, patient states understanding

## 2023-04-20 RX ORDER — SODIUM CHLORIDE 9 MG/ML
5-250 INJECTION, SOLUTION INTRAVENOUS PRN
OUTPATIENT
Start: 2023-04-20

## 2023-04-20 RX ORDER — SODIUM CHLORIDE 9 MG/ML
INJECTION, SOLUTION INTRAVENOUS CONTINUOUS
OUTPATIENT
Start: 2023-04-20

## 2023-04-20 RX ORDER — FAMOTIDINE 10 MG/ML
20 INJECTION, SOLUTION INTRAVENOUS
OUTPATIENT
Start: 2023-04-20

## 2023-04-20 RX ORDER — ONDANSETRON 2 MG/ML
8 INJECTION INTRAMUSCULAR; INTRAVENOUS
OUTPATIENT
Start: 2023-04-20

## 2023-04-20 RX ORDER — ACETAMINOPHEN 325 MG/1
650 TABLET ORAL
OUTPATIENT
Start: 2023-04-20

## 2023-04-20 RX ORDER — DIPHENHYDRAMINE HYDROCHLORIDE 50 MG/ML
50 INJECTION INTRAMUSCULAR; INTRAVENOUS
OUTPATIENT
Start: 2023-04-20

## 2023-04-20 RX ORDER — HEPARIN SODIUM (PORCINE) LOCK FLUSH IV SOLN 100 UNIT/ML 100 UNIT/ML
500 SOLUTION INTRAVENOUS PRN
OUTPATIENT
Start: 2023-04-20

## 2023-04-20 RX ORDER — EPINEPHRINE 1 MG/ML
0.3 INJECTION, SOLUTION, CONCENTRATE INTRAVENOUS PRN
OUTPATIENT
Start: 2023-04-20

## 2023-04-20 RX ORDER — ALBUTEROL SULFATE 90 UG/1
4 AEROSOL, METERED RESPIRATORY (INHALATION) PRN
OUTPATIENT
Start: 2023-04-20

## 2023-04-20 RX ORDER — SODIUM CHLORIDE 0.9 % (FLUSH) 0.9 %
5-40 SYRINGE (ML) INJECTION PRN
OUTPATIENT
Start: 2023-04-20

## 2023-04-24 ENCOUNTER — HOSPITAL ENCOUNTER (OUTPATIENT)
Dept: INFUSION THERAPY | Age: 43
Discharge: HOME | End: 2023-04-24
Payer: COMMERCIAL

## 2023-04-24 VITALS
OXYGEN SATURATION: 97 % | HEIGHT: 67 IN | SYSTOLIC BLOOD PRESSURE: 134 MMHG | TEMPERATURE: 97.6 F | HEART RATE: 82 BPM | BODY MASS INDEX: 39.62 KG/M2 | WEIGHT: 252.4 LBS | DIASTOLIC BLOOD PRESSURE: 66 MMHG

## 2023-04-24 DIAGNOSIS — K90.9 MALABSORPTION OF IRON: ICD-10-CM

## 2023-04-24 DIAGNOSIS — D50.0 IRON DEFICIENCY ANEMIA SECONDARY TO BLOOD LOSS (CHRONIC): Primary | ICD-10-CM

## 2023-04-24 DIAGNOSIS — D50.9 IRON DEFICIENCY ANEMIA, UNSPECIFIED IRON DEFICIENCY ANEMIA TYPE: ICD-10-CM

## 2023-04-24 PROCEDURE — 96365 THER/PROPH/DIAG IV INF INIT: CPT

## 2023-04-24 PROCEDURE — 6360000002 HC RX W HCPCS: Performed by: INTERNAL MEDICINE

## 2023-04-24 PROCEDURE — 2580000003 HC RX 258: Performed by: INTERNAL MEDICINE

## 2023-04-24 RX ORDER — ALBUTEROL SULFATE 90 UG/1
4 AEROSOL, METERED RESPIRATORY (INHALATION) PRN
Status: CANCELLED | OUTPATIENT
Start: 2023-05-01

## 2023-04-24 RX ORDER — EPINEPHRINE 1 MG/ML
0.3 INJECTION, SOLUTION, CONCENTRATE INTRAVENOUS PRN
Status: CANCELLED | OUTPATIENT
Start: 2023-05-01

## 2023-04-24 RX ORDER — SODIUM CHLORIDE 9 MG/ML
5-250 INJECTION, SOLUTION INTRAVENOUS PRN
Status: CANCELLED | OUTPATIENT
Start: 2023-05-01

## 2023-04-24 RX ORDER — FAMOTIDINE 10 MG/ML
20 INJECTION, SOLUTION INTRAVENOUS
Status: CANCELLED | OUTPATIENT
Start: 2023-05-01

## 2023-04-24 RX ORDER — ACETAMINOPHEN 325 MG/1
650 TABLET ORAL
Status: CANCELLED | OUTPATIENT
Start: 2023-05-01

## 2023-04-24 RX ORDER — DIPHENHYDRAMINE HYDROCHLORIDE 50 MG/ML
50 INJECTION INTRAMUSCULAR; INTRAVENOUS
Status: CANCELLED | OUTPATIENT
Start: 2023-05-01

## 2023-04-24 RX ORDER — HEPARIN SODIUM (PORCINE) LOCK FLUSH IV SOLN 100 UNIT/ML 100 UNIT/ML
500 SOLUTION INTRAVENOUS PRN
Status: CANCELLED | OUTPATIENT
Start: 2023-05-01

## 2023-04-24 RX ORDER — SODIUM CHLORIDE 0.9 % (FLUSH) 0.9 %
5-40 SYRINGE (ML) INJECTION PRN
Status: CANCELLED | OUTPATIENT
Start: 2023-05-01

## 2023-04-24 RX ORDER — ONDANSETRON 2 MG/ML
8 INJECTION INTRAMUSCULAR; INTRAVENOUS
Status: CANCELLED | OUTPATIENT
Start: 2023-05-01

## 2023-04-24 RX ORDER — SODIUM CHLORIDE 9 MG/ML
5-250 INJECTION, SOLUTION INTRAVENOUS PRN
Status: DISCONTINUED | OUTPATIENT
Start: 2023-04-24 | End: 2023-04-25 | Stop reason: HOSPADM

## 2023-04-24 RX ORDER — SODIUM CHLORIDE 9 MG/ML
INJECTION, SOLUTION INTRAVENOUS CONTINUOUS
Status: CANCELLED | OUTPATIENT
Start: 2023-05-01

## 2023-04-24 RX ADMIN — SODIUM CHLORIDE 20 ML/HR: 9 INJECTION, SOLUTION INTRAVENOUS at 08:54

## 2023-04-24 RX ADMIN — FERRIC CARBOXYMALTOSE INJECTION 750 MG: 50 INJECTION, SOLUTION INTRAVENOUS at 08:55

## 2023-04-24 NOTE — PROGRESS NOTES
Ambulated to treatment suite for 1st Iron infusion. Patient has no concerns at this time. PIV placed in left arm, blood return noted. Treatment approved and given. Call light within reach. During infusion, patient reported burning to IV site. Stopped medication. Flushed line, positive blood return noted, burning stopped. Increased fluid rate to 40mL/hr and decreased iron to 400 mL/hr. Patient reports no longer burning, and tolerated remainder of infusion without further discomfort. 30 min post-infusion monitoring completed. Tolerated infusion without incident. Discharge instructions provided, left treatment suite ambulatory. RTC 05/01 for next iron.

## 2023-04-25 ENCOUNTER — OFFICE VISIT (OUTPATIENT)
Dept: CARDIOLOGY CLINIC | Age: 43
End: 2023-04-25
Payer: COMMERCIAL

## 2023-04-25 VITALS
WEIGHT: 252 LBS | SYSTOLIC BLOOD PRESSURE: 94 MMHG | HEART RATE: 82 BPM | BODY MASS INDEX: 39.55 KG/M2 | HEIGHT: 67 IN | DIASTOLIC BLOOD PRESSURE: 60 MMHG

## 2023-04-25 DIAGNOSIS — I10 HTN, GOAL BELOW 140/90: ICD-10-CM

## 2023-04-25 DIAGNOSIS — D50.0 IRON DEFICIENCY ANEMIA SECONDARY TO BLOOD LOSS (CHRONIC): ICD-10-CM

## 2023-04-25 DIAGNOSIS — D50.9 IRON DEFICIENCY ANEMIA, UNSPECIFIED IRON DEFICIENCY ANEMIA TYPE: ICD-10-CM

## 2023-04-25 DIAGNOSIS — Z82.49 FAMILY HISTORY OF DVT: ICD-10-CM

## 2023-04-25 DIAGNOSIS — R07.2 PRECORDIAL PAIN: Primary | ICD-10-CM

## 2023-04-25 PROCEDURE — 3074F SYST BP LT 130 MM HG: CPT | Performed by: INTERNAL MEDICINE

## 2023-04-25 PROCEDURE — 3078F DIAST BP <80 MM HG: CPT | Performed by: INTERNAL MEDICINE

## 2023-04-25 PROCEDURE — 99214 OFFICE O/P EST MOD 30 MIN: CPT | Performed by: INTERNAL MEDICINE

## 2023-04-25 NOTE — PROGRESS NOTES
CARDIOLOGY NOTE    Chief Complaint: chest pain     HPI:   Elma Jc is a 44y.o. year old who has Past medical history as noted below. She reports she gets left shoulder pain when she is exerting or walking doing work out like steps started 3 weeks ago, The pain lasts for 10 -15 mins , it is 6/10 in intensity and resolves on its own   She did well during Cardiolite completed 10 mets but had apical thinning and scar? Vs artifact    she has been off all her meds , she used to be  on metoprolol and  protonix , she had stress test in Sept 2021 on  treadmill she completed 7 METS and 6 Mins of exercise she has history of gastric sleeve surgery as well as hiatal hernia / BP is in low 100 to 90 but denies feeling dizzy or lightheaded  She is trying to get pregnant so she is taking fertility drugs   She is pre diabetic started metformin   . She had gastric sleeve surgery in 2016 , she had normal stress test in  2017 . She denies any family history cardiac problems but younger brother had stroke when he was 15 .  She denies any alcoholc or smoking       Current Outpatient Medications   Medication Sig Dispense Refill    metoprolol tartrate (LOPRESSOR) 25 MG tablet Take 1 tablet by mouth 2 times daily Take 1 tab the morning of the test and 2nd tab at the time of the test 3 tablet 0    nitroGLYCERIN (NITROSTAT) 0.4 MG SL tablet Place 1 tablet under the tongue every 5 minutes as needed for Chest pain 25 tablet 3    clomiPHENE (CLOMID) 50 MG tablet Take 2 tablets by mouth daily Days 3-7 10 tablet 5    Prenatal Vit-Fe Sulfate-FA-DHA (PRENATAL VITAMIN/MIN +DHA) 27-0.8-200 MG CAPS Take 1 tablet by mouth daily (may substitute any prenatal vitamin covered by insurance, ok for prenatal without DHA if [facility] based prenatal is not covered) 90 capsule 3    metFORMIN (GLUCOPHAGE) 500 MG tablet Take 1 tablet by mouth 2 times daily (with meals)      letrozole (FEMARA) 2.5 MG tablet PLEASE SEE ATTACHED

## 2023-04-27 ENCOUNTER — TELEPHONE (OUTPATIENT)
Dept: CARDIOLOGY CLINIC | Age: 43
End: 2023-04-27

## 2023-05-01 ENCOUNTER — HOSPITAL ENCOUNTER (OUTPATIENT)
Dept: INFUSION THERAPY | Age: 43
Discharge: HOME | End: 2023-05-01
Payer: COMMERCIAL

## 2023-05-01 VITALS
SYSTOLIC BLOOD PRESSURE: 118 MMHG | HEIGHT: 67 IN | WEIGHT: 251.8 LBS | BODY MASS INDEX: 39.52 KG/M2 | DIASTOLIC BLOOD PRESSURE: 72 MMHG | OXYGEN SATURATION: 100 % | TEMPERATURE: 97.7 F | HEART RATE: 84 BPM

## 2023-05-01 DIAGNOSIS — K90.9 MALABSORPTION OF IRON: ICD-10-CM

## 2023-05-01 DIAGNOSIS — D50.9 IRON DEFICIENCY ANEMIA, UNSPECIFIED IRON DEFICIENCY ANEMIA TYPE: ICD-10-CM

## 2023-05-01 DIAGNOSIS — D50.0 IRON DEFICIENCY ANEMIA SECONDARY TO BLOOD LOSS (CHRONIC): Primary | ICD-10-CM

## 2023-05-01 PROCEDURE — 96365 THER/PROPH/DIAG IV INF INIT: CPT

## 2023-05-01 PROCEDURE — 2580000003 HC RX 258: Performed by: INTERNAL MEDICINE

## 2023-05-01 PROCEDURE — 6360000002 HC RX W HCPCS: Performed by: INTERNAL MEDICINE

## 2023-05-01 RX ORDER — SODIUM CHLORIDE 9 MG/ML
INJECTION, SOLUTION INTRAVENOUS CONTINUOUS
Status: CANCELLED | OUTPATIENT
Start: 2023-05-01

## 2023-05-01 RX ORDER — FAMOTIDINE 10 MG/ML
20 INJECTION, SOLUTION INTRAVENOUS
Status: CANCELLED | OUTPATIENT
Start: 2023-05-01

## 2023-05-01 RX ORDER — ALBUTEROL SULFATE 90 UG/1
4 AEROSOL, METERED RESPIRATORY (INHALATION) PRN
Status: CANCELLED | OUTPATIENT
Start: 2023-05-01

## 2023-05-01 RX ORDER — SODIUM CHLORIDE 0.9 % (FLUSH) 0.9 %
5-40 SYRINGE (ML) INJECTION PRN
Status: CANCELLED | OUTPATIENT
Start: 2023-05-01

## 2023-05-01 RX ORDER — ONDANSETRON 2 MG/ML
8 INJECTION INTRAMUSCULAR; INTRAVENOUS
Status: CANCELLED | OUTPATIENT
Start: 2023-05-01

## 2023-05-01 RX ORDER — EPINEPHRINE 1 MG/ML
0.3 INJECTION, SOLUTION, CONCENTRATE INTRAVENOUS PRN
Status: CANCELLED | OUTPATIENT
Start: 2023-05-01

## 2023-05-01 RX ORDER — SODIUM CHLORIDE 9 MG/ML
5-250 INJECTION, SOLUTION INTRAVENOUS PRN
Status: DISCONTINUED | OUTPATIENT
Start: 2023-05-01 | End: 2023-05-02 | Stop reason: HOSPADM

## 2023-05-01 RX ORDER — SODIUM CHLORIDE 9 MG/ML
5-250 INJECTION, SOLUTION INTRAVENOUS PRN
Status: CANCELLED | OUTPATIENT
Start: 2023-05-01

## 2023-05-01 RX ORDER — ACETAMINOPHEN 325 MG/1
650 TABLET ORAL
Status: CANCELLED | OUTPATIENT
Start: 2023-05-01

## 2023-05-01 RX ORDER — DIPHENHYDRAMINE HYDROCHLORIDE 50 MG/ML
50 INJECTION INTRAMUSCULAR; INTRAVENOUS
Status: CANCELLED | OUTPATIENT
Start: 2023-05-01

## 2023-05-01 RX ORDER — HEPARIN SODIUM (PORCINE) LOCK FLUSH IV SOLN 100 UNIT/ML 100 UNIT/ML
500 SOLUTION INTRAVENOUS PRN
Status: CANCELLED | OUTPATIENT
Start: 2023-05-01

## 2023-05-01 RX ADMIN — SODIUM CHLORIDE 40 ML/HR: 9 INJECTION, SOLUTION INTRAVENOUS at 09:37

## 2023-05-01 RX ADMIN — FERRIC CARBOXYMALTOSE INJECTION 750 MG: 50 INJECTION, SOLUTION INTRAVENOUS at 09:37

## 2023-05-01 NOTE — PROGRESS NOTES
Ambulated to treatment suite for 1st Iron infusion. Patient has no concerns at this time, and tolerated last infusion after leaving last week. PIV placed in right arm, blood return noted. Treatment approved and given. Call light within reach. Patient did not tolerate original rate of iron last week, fluid rate changed to 40mL/hr and decreased iron to 400 mL/hr. 30 min post-infusion monitoring completed. Tolerated infusion without incident. Discharge instructions provided, left treatment suite ambulatory.  RTC 05/12 for OV with Belen Uriarte NP.

## 2023-05-09 ENCOUNTER — HOSPITAL ENCOUNTER (OUTPATIENT)
Dept: WOMENS IMAGING | Age: 43
Discharge: HOME | End: 2023-05-09
Payer: COMMERCIAL

## 2023-05-09 DIAGNOSIS — Z12.31 SCREENING MAMMOGRAM FOR BREAST CANCER: ICD-10-CM

## 2023-05-09 PROCEDURE — 77063 BREAST TOMOSYNTHESIS BI: CPT

## 2023-05-10 DIAGNOSIS — K90.9 MALABSORPTION OF IRON: Primary | ICD-10-CM

## 2023-05-11 ENCOUNTER — HOSPITAL ENCOUNTER (OUTPATIENT)
Dept: INFUSION THERAPY | Age: 43
Discharge: HOME | End: 2023-05-11
Payer: COMMERCIAL

## 2023-05-11 DIAGNOSIS — D50.9 IRON DEFICIENCY ANEMIA, UNSPECIFIED IRON DEFICIENCY ANEMIA TYPE: ICD-10-CM

## 2023-05-11 DIAGNOSIS — K90.9 MALABSORPTION OF IRON: ICD-10-CM

## 2023-05-11 LAB
ALBUMIN SERPL-MCNC: 4 GM/DL (ref 3.4–5)
ALP BLD-CCNC: 63 IU/L (ref 40–129)
ALT SERPL-CCNC: 11 U/L (ref 10–40)
ANION GAP SERPL CALCULATED.3IONS-SCNC: 7 MMOL/L (ref 4–16)
AST SERPL-CCNC: 14 IU/L (ref 15–37)
BASOPHILS ABSOLUTE: 0.1 K/CU MM
BASOPHILS RELATIVE PERCENT: 0.8 % (ref 0–1)
BILIRUB SERPL-MCNC: 0.7 MG/DL (ref 0–1)
BUN SERPL-MCNC: 9 MG/DL (ref 6–23)
CALCIUM SERPL-MCNC: 8.5 MG/DL (ref 8.3–10.6)
CHLORIDE BLD-SCNC: 106 MMOL/L (ref 99–110)
CO2: 27 MMOL/L (ref 21–32)
CREAT SERPL-MCNC: 0.6 MG/DL (ref 0.6–1.1)
DIFFERENTIAL TYPE: ABNORMAL
EOSINOPHILS ABSOLUTE: 0.1 K/CU MM
EOSINOPHILS RELATIVE PERCENT: 2 % (ref 0–3)
FERRITIN: 199 NG/ML (ref 15–150)
FOLATE SERPL-MCNC: 16.3 NG/ML (ref 3.1–17.5)
GFR SERPL CREATININE-BSD FRML MDRD: >60 ML/MIN/1.73M2
GLUCOSE SERPL-MCNC: 76 MG/DL (ref 70–99)
HCT VFR BLD CALC: 33.8 % (ref 37–47)
HEMOGLOBIN: 10.6 GM/DL (ref 12.5–16)
IRON: 87 UG/DL (ref 37–145)
LYMPHOCYTES ABSOLUTE: 2.6 K/CU MM
LYMPHOCYTES RELATIVE PERCENT: 43.4 % (ref 24–44)
MCH RBC QN AUTO: 25.1 PG (ref 27–31)
MCHC RBC AUTO-ENTMCNC: 31.4 % (ref 32–36)
MCV RBC AUTO: 79.9 FL (ref 78–100)
MONOCYTES ABSOLUTE: 0.5 K/CU MM
MONOCYTES RELATIVE PERCENT: 7.9 % (ref 0–4)
PCT TRANSFERRIN: 31 % (ref 10–44)
PDW BLD-RTO: 25.2 % (ref 11.7–14.9)
PLATELET # BLD: 283 K/CU MM (ref 140–440)
PMV BLD AUTO: 10 FL (ref 7.5–11.1)
POTASSIUM SERPL-SCNC: 4.2 MMOL/L (ref 3.5–5.1)
RBC # BLD: 4.23 M/CU MM (ref 4.2–5.4)
SEGMENTED NEUTROPHILS ABSOLUTE COUNT: 2.7 K/CU MM
SEGMENTED NEUTROPHILS RELATIVE PERCENT: 45.9 % (ref 36–66)
SODIUM BLD-SCNC: 140 MMOL/L (ref 135–145)
TOTAL IRON BINDING CAPACITY: 277 UG/DL (ref 250–450)
TOTAL PROTEIN: 6.6 GM/DL (ref 6.4–8.2)
UNSATURATED IRON BINDING CAPACITY: 190 UG/DL (ref 110–370)
VITAMIN B-12: 729.4 PG/ML (ref 211–911)
WBC # BLD: 5.9 K/CU MM (ref 4–10.5)

## 2023-05-11 PROCEDURE — 36415 COLL VENOUS BLD VENIPUNCTURE: CPT

## 2023-05-11 PROCEDURE — 83540 ASSAY OF IRON: CPT

## 2023-05-11 PROCEDURE — 83550 IRON BINDING TEST: CPT

## 2023-05-11 PROCEDURE — 85025 COMPLETE CBC W/AUTO DIFF WBC: CPT

## 2023-05-11 PROCEDURE — 82746 ASSAY OF FOLIC ACID SERUM: CPT

## 2023-05-11 PROCEDURE — 80053 COMPREHEN METABOLIC PANEL: CPT

## 2023-05-11 PROCEDURE — 82607 VITAMIN B-12: CPT

## 2023-05-11 PROCEDURE — 82728 ASSAY OF FERRITIN: CPT

## 2023-05-12 ENCOUNTER — OFFICE VISIT (OUTPATIENT)
Dept: GASTROENTEROLOGY | Age: 43
End: 2023-05-12
Payer: COMMERCIAL

## 2023-05-12 ENCOUNTER — HOSPITAL ENCOUNTER (OUTPATIENT)
Dept: INFUSION THERAPY | Age: 43
Discharge: HOME | End: 2023-05-12
Payer: COMMERCIAL

## 2023-05-12 ENCOUNTER — OFFICE VISIT (OUTPATIENT)
Dept: ONCOLOGY | Age: 43
End: 2023-05-12
Payer: COMMERCIAL

## 2023-05-12 VITALS
TEMPERATURE: 97.8 F | DIASTOLIC BLOOD PRESSURE: 69 MMHG | HEART RATE: 91 BPM | WEIGHT: 247 LBS | SYSTOLIC BLOOD PRESSURE: 119 MMHG | HEIGHT: 67 IN | OXYGEN SATURATION: 99 % | BODY MASS INDEX: 38.77 KG/M2

## 2023-05-12 VITALS
HEIGHT: 67 IN | SYSTOLIC BLOOD PRESSURE: 130 MMHG | WEIGHT: 248.8 LBS | DIASTOLIC BLOOD PRESSURE: 78 MMHG | HEART RATE: 90 BPM | OXYGEN SATURATION: 97 % | BODY MASS INDEX: 39.05 KG/M2 | TEMPERATURE: 97.2 F

## 2023-05-12 DIAGNOSIS — K90.9 MALABSORPTION OF IRON: Primary | ICD-10-CM

## 2023-05-12 DIAGNOSIS — K58.2 IRRITABLE BOWEL SYNDROME WITH MIXED BOWEL HABITS: ICD-10-CM

## 2023-05-12 DIAGNOSIS — Z98.84 STATUS POST LAPAROSCOPIC SLEEVE GASTRECTOMY: ICD-10-CM

## 2023-05-12 DIAGNOSIS — D50.0 IRON DEFICIENCY ANEMIA SECONDARY TO BLOOD LOSS (CHRONIC): Primary | ICD-10-CM

## 2023-05-12 DIAGNOSIS — D50.9 IRON DEFICIENCY ANEMIA, UNSPECIFIED IRON DEFICIENCY ANEMIA TYPE: ICD-10-CM

## 2023-05-12 PROCEDURE — 3078F DIAST BP <80 MM HG: CPT | Performed by: INTERNAL MEDICINE

## 2023-05-12 PROCEDURE — 99213 OFFICE O/P EST LOW 20 MIN: CPT | Performed by: NURSE PRACTITIONER

## 2023-05-12 PROCEDURE — 3074F SYST BP LT 130 MM HG: CPT | Performed by: NURSE PRACTITIONER

## 2023-05-12 PROCEDURE — 99211 OFF/OP EST MAY X REQ PHY/QHP: CPT

## 2023-05-12 PROCEDURE — 3074F SYST BP LT 130 MM HG: CPT | Performed by: INTERNAL MEDICINE

## 2023-05-12 PROCEDURE — 3078F DIAST BP <80 MM HG: CPT | Performed by: NURSE PRACTITIONER

## 2023-05-12 PROCEDURE — 99204 OFFICE O/P NEW MOD 45 MIN: CPT | Performed by: INTERNAL MEDICINE

## 2023-05-12 ASSESSMENT — PATIENT HEALTH QUESTIONNAIRE - PHQ9
1. LITTLE INTEREST OR PLEASURE IN DOING THINGS: 0
SUM OF ALL RESPONSES TO PHQ QUESTIONS 1-9: 0
SUM OF ALL RESPONSES TO PHQ9 QUESTIONS 1 & 2: 0
2. FEELING DOWN, DEPRESSED OR HOPELESS: 0

## 2023-05-12 NOTE — PROGRESS NOTES
[address] [facility]             MD Joon Tejeda MD Brdot Martines, APRN-CNP             [address] [facility] [place], [address]             [phone] fax [phone]        Gastroenterology Clinic Consultation    Joon Katz MD  Encounter Date: 05/12/23     CC: Anemia       Randy Antunez, APRN - CNP  [address] [facility]  [place],  [address]     History obtained from: patient, family, medical records     Subjective:       Mindi Preston is an 44 y. o. AAfemale obesity status post laparoscopic sleeve gastrectomy, weight loss, JERMAINE, anxiety, hypertension who presents for Anemia    Patient has been referred by her hematology oncology office for iron deficiency anemia. She does have a history of sleeve gastrectomy back in 2017 and lost 45 pounds but has gained it all back. Also notable is that she does mention that she has heavy menstrual period's at time. No melena, hematochezia. She does have craving for nonnutritional substances. Denies any chest pain, nausea, emesis. Denies any GERD, Dysphagia, melena, hematochezia. +so alternating diarrrhea and constipation. Diarrhea is new exacerbated by sugar and lettuce. However she does endorse intermittent lower abdominal discomfort which is relieved by passing gas or having a BM. Her hemoglobin has fluctuated between 8-10. Most recently labs in February 2023 revealed hemoglobin of 10.3, MCV of 76.3 iron studies notable for ferritin of 8, iron of 21, TIBC of 364. Transferrin saturation 6%. Folate and B12 normal.  Platelet count normal.  She has been on parenteral iron supplementation however continues to be anemic. Her most recent hemoglobin done on 05/11/2023 noted to be 10.6.   Iron indicis did show improvement      Patient Active Problem List   Diagnosis    Family history of DVT    TIA (stress urinary incontinence, female)    JERMAINE (obstructive sleep apnea)    Anxiety

## 2023-05-12 NOTE — PROGRESS NOTES
MA Rooming Questions  Patient: Dion Varma  MRN: [id number]    Date: 5/12/2023        1. Do you have any new issues?   no         2. Do you need any refills on medications?    no    3. Have you had any imaging done since your last visit? yes - labs 5/11    4. Have you been hospitalized or seen in the emergency room since your last visit here?   no    5. Did the patient have a depression screening completed today?  Yes    PHQ-9 Total Score: 0 (5/12/2023 10:13 AM)       PHQ-9 Given to (if applicable):               PHQ-9 Score (if applicable):                     [] Positive     []  Negative              Does question #9 need addressed (if applicable)                     [] Yes    []  No               Edmund Elliott CMA
PRESENT 04/12/2023     Lab Results   Component Value Date    ESR 11 03/11/2019       Chemistry:  Lab Results   Component Value Date     05/11/2023    K 4.2 05/11/2023     05/11/2023    CO2 27 05/11/2023    BUN 9 05/11/2023    CREATININE 0.6 05/11/2023    GLUCOSE 76 05/11/2023    CALCIUM 8.5 05/11/2023    PROT 6.6 05/11/2023    LABALBU 4.0 05/11/2023    BILITOT 0.7 05/11/2023    ALKPHOS 63 05/11/2023    AST 14 (L) 05/11/2023    ALT 11 05/11/2023    LABGLOM >60 05/11/2023    GFRAA >60 09/19/2022    MG 1.9 02/09/2018    POCGLU 71 09/06/2017     Lab Results   Component Value Date     03/11/2019     No results found for: LD  Lab Results   Component Value Date    TSHHS 1.530 03/11/2019       Immunology:  Lab Results   Component Value Date    PROT 6.6 05/11/2023    SPEP INTERPRETATION - Within normal limits. RS 03/11/2019    SPEP INTERPRETATION - Within normal limits. RS 03/11/2019    ALBUMINELP 3.9 03/11/2019    LABALPH 0.3 03/11/2019    LABALPH 0.6 03/11/2019    LABBETA 1.2 03/11/2019    GAMGLOB 1.5 03/11/2019     No results found for: [place], KLFLCR  No results found for: B2M    Coagulation Panel:  Lab Results   Component Value Date    DDIMER <200 05/21/2016       Anemia Panel:  Lab Results   Component Value Date    DAUAFQWJ22 729.4 05/11/2023    FOLATE 16.3 05/11/2023       Tumor Markers:  No results found for: , CEA, , LABCA2, PSA       Observations:  Performance Status: ECOG 0  Depression Status:      Assessment & Plan: Phill Shaikh female s/p gastric sleeve referred for anemia    Anemia: iron indices, microcytosis and ferritin were  suggestive of thi. Most probably secondary to malabsorption from gastric sleeve and also note menorrhagia. No evidence of other blood loss, other nutritional def, hemolysis, monoclonal gammopathy, thyroid dys, def of microelements. Has received parenteral iron in July 2022,  is on SlowFe bid, advised her to take every other day.  Continue Vitamin D

## 2023-05-15 ENCOUNTER — TELEPHONE (OUTPATIENT)
Dept: GASTROENTEROLOGY | Age: 43
End: 2023-05-15

## 2023-05-15 NOTE — TELEPHONE ENCOUNTER
Per [employer] website; C/E are approved.  [facility]#[id number]

## 2023-05-24 ENCOUNTER — HOSPITAL ENCOUNTER (OUTPATIENT)
Dept: CT IMAGING | Age: 43
Discharge: HOME | End: 2023-05-24
Payer: COMMERCIAL

## 2023-05-24 VITALS — SYSTOLIC BLOOD PRESSURE: 119 MMHG | DIASTOLIC BLOOD PRESSURE: 70 MMHG | HEART RATE: 78 BPM

## 2023-05-24 DIAGNOSIS — R07.2 PRECORDIAL PAIN: ICD-10-CM

## 2023-05-24 PROCEDURE — 6360000004 HC RX CONTRAST MEDICATION: Performed by: INTERNAL MEDICINE

## 2023-05-24 PROCEDURE — 6370000000 HC RX 637 (ALT 250 FOR IP): Performed by: INTERNAL MEDICINE

## 2023-05-24 PROCEDURE — 2580000003 HC RX 258: Performed by: INTERNAL MEDICINE

## 2023-05-24 PROCEDURE — 75574 CT ANGIO HRT W/3D IMAGE: CPT

## 2023-05-24 PROCEDURE — 75574 CT ANGIO HRT W/3D IMAGE: CPT | Performed by: INTERNAL MEDICINE

## 2023-05-24 RX ORDER — METOPROLOL TARTRATE 50 MG/1
100 TABLET, FILM COATED ORAL ONCE
Status: COMPLETED | OUTPATIENT
Start: 2023-05-24 | End: 2023-05-24

## 2023-05-24 RX ORDER — 0.9 % SODIUM CHLORIDE 0.9 %
10 VIAL (ML) INJECTION
Status: COMPLETED | OUTPATIENT
Start: 2023-05-24 | End: 2023-05-24

## 2023-05-24 RX ADMIN — METOPROLOL TARTRATE 100 MG: 50 TABLET ORAL at 07:53

## 2023-05-24 RX ADMIN — IOPAMIDOL 100 ML: 755 INJECTION, SOLUTION INTRAVENOUS at 10:35

## 2023-05-24 RX ADMIN — Medication 10 ML: at 10:35

## 2023-05-25 ENCOUNTER — TELEPHONE (OUTPATIENT)
Dept: CARDIOLOGY CLINIC | Age: 43
End: 2023-05-25

## 2023-05-25 DIAGNOSIS — Z30.09 FAMILY PLANNING COUNSELING: ICD-10-CM

## 2023-05-25 DIAGNOSIS — N97.9 INFERTILITY, FEMALE: Primary | ICD-10-CM

## 2023-05-25 NOTE — PROCEDURES
CARDIAC FINDINGS:  Cardiac CT scanning was performed, CT angiography during contrast administration using a 128 Scanner using a slice thickness of 7.763 mm and Gantry rotation time of 0.28 sec   Scanning was performed using 120 K  using auto adjustment of mA  Adjustment achieved low dose, retrospective gating was used  Contrast Type: isovue 370   Contrast used: 1cc/lb Max 70 cc     Scan length: Excellent    Left Main Coronary artery:  Left main originates normally from the left coronary sinus and gives rise to the left anterior descending artery and left circumflex artery. It is free of any significant atherosclerotic disease. Left anterior descending artery:  Left anterior descending artery is a normal caliber vessel that wraps around the apex and gives off 2 diagonal branches. The diagonal branches are free of any significant atherosclerotic disease. There is / no calcified plaque noted . There are minor luminal irregularities noted. Left circumflex artery:  Left circumflex artery is a moderate size vessel that gives off a marginal branch. The left circumflex artery is free of any significant atherosclerotic disease. Right coronary artery:  Right coronary artery originates from the right coronary sinus. It seems to be the dominant vessel and gives rise to the posterior descending artery and posterior ventricular artery. The RCA is moderate size vessel. [place] Posterior descending artery and posterior lateral branches are free of any significant disease. Pericardium:  Pericardium is normal in appearance without any thickening, calcification or pericardial fluid. Cardiac structures: The ascending aorta is measured at 27.4 mm. Interpretation summary:  There is no significant coronary artery disease identified. Myocardium does not show any evidence of infarct or thickening. NON-CARDIAC FINDINGS - []      Please note that only cardiac images and windows were evaluated by cardiology.  Images

## 2023-05-26 ENCOUNTER — ANESTHESIA EVENT (OUTPATIENT)
Dept: OPERATING ROOM | Age: 43
End: 2023-05-26
Payer: COMMERCIAL

## 2023-05-30 ENCOUNTER — ANESTHESIA (OUTPATIENT)
Dept: OPERATING ROOM | Age: 43
End: 2023-05-30
Payer: COMMERCIAL

## 2023-05-30 ENCOUNTER — HOSPITAL ENCOUNTER (OUTPATIENT)
Age: 43
Setting detail: OUTPATIENT SURGERY
Discharge: HOME | End: 2023-05-30
Attending: INTERNAL MEDICINE | Admitting: INTERNAL MEDICINE
Payer: COMMERCIAL

## 2023-05-30 VITALS
HEART RATE: 71 BPM | RESPIRATION RATE: 16 BRPM | WEIGHT: 247 LBS | SYSTOLIC BLOOD PRESSURE: 106 MMHG | BODY MASS INDEX: 38.77 KG/M2 | TEMPERATURE: 99 F | DIASTOLIC BLOOD PRESSURE: 74 MMHG | HEIGHT: 67 IN | OXYGEN SATURATION: 100 %

## 2023-05-30 DIAGNOSIS — D50.0 IRON DEFICIENCY ANEMIA SECONDARY TO BLOOD LOSS (CHRONIC): ICD-10-CM

## 2023-05-30 DIAGNOSIS — K58.2 IRRITABLE BOWEL SYNDROME WITH CONSTIPATION AND DIARRHEA: ICD-10-CM

## 2023-05-30 LAB
GLUCOSE BLD-MCNC: 77 MG/DL (ref 70–99)
PREGNANCY TEST URINE, POC: NEGATIVE

## 2023-05-30 PROCEDURE — 45380 COLONOSCOPY AND BIOPSY: CPT | Performed by: INTERNAL MEDICINE

## 2023-05-30 PROCEDURE — 3700000000 HC ANESTHESIA ATTENDED CARE: Performed by: INTERNAL MEDICINE

## 2023-05-30 PROCEDURE — 81025 URINE PREGNANCY TEST: CPT

## 2023-05-30 PROCEDURE — 3609012400 HC EGD TRANSORAL BIOPSY SINGLE/MULTIPLE: Performed by: INTERNAL MEDICINE

## 2023-05-30 PROCEDURE — 45385 COLONOSCOPY W/LESION REMOVAL: CPT | Performed by: INTERNAL MEDICINE

## 2023-05-30 PROCEDURE — 43239 EGD BIOPSY SINGLE/MULTIPLE: CPT | Performed by: INTERNAL MEDICINE

## 2023-05-30 PROCEDURE — 88342 IMHCHEM/IMCYTCHM 1ST ANTB: CPT | Performed by: PATHOLOGY

## 2023-05-30 PROCEDURE — 3700000001 HC ADD 15 MINUTES (ANESTHESIA): Performed by: INTERNAL MEDICINE

## 2023-05-30 PROCEDURE — 3609010600 HC COLONOSCOPY POLYPECTOMY SNARE/COLD BIOPSY: Performed by: INTERNAL MEDICINE

## 2023-05-30 PROCEDURE — 88305 TISSUE EXAM BY PATHOLOGIST: CPT | Performed by: PATHOLOGY

## 2023-05-30 PROCEDURE — 2709999900 HC NON-CHARGEABLE SUPPLY: Performed by: INTERNAL MEDICINE

## 2023-05-30 PROCEDURE — 7100000010 HC PHASE II RECOVERY - FIRST 15 MIN: Performed by: INTERNAL MEDICINE

## 2023-05-30 PROCEDURE — 82962 GLUCOSE BLOOD TEST: CPT

## 2023-05-30 PROCEDURE — 7100000011 HC PHASE II RECOVERY - ADDTL 15 MIN: Performed by: INTERNAL MEDICINE

## 2023-05-30 PROCEDURE — 6360000002 HC RX W HCPCS: Performed by: NURSE ANESTHETIST, CERTIFIED REGISTERED

## 2023-05-30 PROCEDURE — 2580000003 HC RX 258: Performed by: ANESTHESIOLOGY

## 2023-05-30 RX ORDER — PROPOFOL 10 MG/ML
INJECTION, EMULSION INTRAVENOUS PRN
Status: DISCONTINUED | OUTPATIENT
Start: 2023-05-30 | End: 2023-05-30 | Stop reason: SDUPTHER

## 2023-05-30 RX ORDER — SODIUM CHLORIDE, SODIUM LACTATE, POTASSIUM CHLORIDE, CALCIUM CHLORIDE 600; 310; 30; 20 MG/100ML; MG/100ML; MG/100ML; MG/100ML
INJECTION, SOLUTION INTRAVENOUS CONTINUOUS
Status: DISCONTINUED | OUTPATIENT
Start: 2023-05-30 | End: 2023-05-30 | Stop reason: HOSPADM

## 2023-05-30 RX ORDER — LIDOCAINE HYDROCHLORIDE 20 MG/ML
INJECTION, SOLUTION INTRAVENOUS PRN
Status: DISCONTINUED | OUTPATIENT
Start: 2023-05-30 | End: 2023-05-30 | Stop reason: SDUPTHER

## 2023-05-30 RX ADMIN — PROPOFOL 900 MG: 10 INJECTION, EMULSION INTRAVENOUS at 11:08

## 2023-05-30 RX ADMIN — SODIUM CHLORIDE, POTASSIUM CHLORIDE, SODIUM LACTATE AND CALCIUM CHLORIDE: 600; 310; 30; 20 INJECTION, SOLUTION INTRAVENOUS at 10:57

## 2023-05-30 RX ADMIN — LIDOCAINE HYDROCHLORIDE 110 MG: 20 INJECTION, SOLUTION INTRAVENOUS at 11:08

## 2023-05-30 ASSESSMENT — PAIN SCALES - GENERAL
PAINLEVEL_OUTOF10: 0
PAINLEVEL_OUTOF10: 0

## 2023-05-30 ASSESSMENT — PAIN - FUNCTIONAL ASSESSMENT: PAIN_FUNCTIONAL_ASSESSMENT: 0-10

## 2023-05-30 NOTE — DISCHARGE INSTRUCTIONS
EGD/COLONOSCOPY    ______CHELA____________________________    OFFICE [facility]___[phone]________________     Call office for polyp biopsy results,  when to repeat and when/where to have EUS done. What to Expect at [place] [address] Recovery:EGD  The only discomfort after your EGD is generally limited to a mild soreness of the throat, which may last a day or two. Call your physician immediately if you have severe chest pain, shortness of breath or a temperature of 100 degrees or higher if taken orally. Your Recovery: COLON   Your doctor will tell you when you can eat and do your other usual activitiesYour doctor will talk to you about when you will need your next colonoscopy. Your doctor can help you decide how often you need to be checked. This will depend on the results of your test and your risk for colorectal cancer. After the test, you may be bloated or have gas pains. You may need to pass gas. If a biopsy was done or a polyp was removed, you may have streaks of blood in your stool (feces) for a few days. This care sheet gives you a general idea about how long it will take for you to recover. But each person recovers at a different pace. Follow the steps below to get better as quickly as possible. How can you care for yourself at home? Activity  Rest as much as you need to after you go home. You should be able to go back to your usual activities the day after the test.  Diet  Follow your doctor's directions for eating after the test.  Drink plenty of fluids (unless your doctor has told you not to). Medications  If you have a sore throat the day after the test, use an over-the-counter spray to numb your throat. Your doctor will tell you if and when you can restart your medicines. He or she will also give you instructions about taking any new medicines. If you take blood thinners, such as warfarin (Coumadin), clopidogrel (Plavix), or aspirin, be sure to talk to your doctor.  He or she will tell

## 2023-05-30 NOTE — ANESTHESIA POSTPROCEDURE EVALUATION
Department of Anesthesiology  Postprocedure Note    Patient: Jesús Foster  MRN: [id number]  YOB: 1980  Date of evaluation: 5/30/2023      Procedure Summary     Date: 05/30/23 Room / Location: [place] [address] [address] [facility]    Anesthesia Start: 1101 Anesthesia Stop: 6882    Procedures:       COLONOSCOPY WITH BIOPSY      EGD BIOPSY Diagnosis:       Iron deficiency anemia secondary to blood loss (chronic)      Irritable bowel syndrome with constipation and diarrhea      (Iron deficiency anemia secondary to blood loss (chronic) [D50.0])      (Irritable bowel syndrome with constipation and diarrhea [K58.2])    Surgeons:  Gina Mccann MD Responsible Provider: GRIS Flannery CRNA    Anesthesia Type: MAC ASA Status: 2          Anesthesia Type: MAC    Scarlet Phase I:      Scarlet Phase II:        Anesthesia Post Evaluation    Patient location during evaluation: bedside  Patient participation: complete - patient participated  Level of consciousness: sleepy but conscious  Pain score: 0  Airway patency: patent  Nausea & Vomiting: no nausea and no vomiting  Complications: no  Cardiovascular status: blood pressure returned to baseline and hemodynamically stable  Respiratory status: acceptable  Hydration status: stable

## 2023-05-30 NOTE — PROGRESS NOTES
1210 Pt received from Romain and report received from [facility]. Pt tisha c/o. Pt abdomen soft and non tender.  at bedside. Call light in reach. 1225 In to check on pt. Pt denies c/o.  at bedside. Call light in reach. 1241 Discharge instructions given to pt and . Both verbalized understanding of instructions. Pt up to restroom. Pt wants to get dressed in restroom. 1250 Pt back to bay.  to get car. Pt denies c/o or needs. Call light in reach. 1255 Pt discharged to home to husbands vehicle to drive pt home.

## 2023-05-30 NOTE — INTERVAL H&P NOTE
Update History & Physical    The patient's History and Physical of May 12, 2023 was reviewed with the patient and I examined the patient. There was no change. The surgical site was confirmed by the patient and me. Plan: The risks, benefits, expected outcome, and alternative to the recommended procedure have been discussed with the patient. Patient understands and wants to proceed with the procedure.      Electronically signed by Kae Coles MD on 5/30/2023 at 10:52 AM

## 2023-06-02 NOTE — RESULT ENCOUNTER NOTE
Duodenal Biopsy peptic injury. Stomach biopsy negative for H. Pylori  Random colon biopsy negative   Sigmoid polyp hyperplastic repeat in 10 years.

## 2023-06-19 ENCOUNTER — OFFICE VISIT (OUTPATIENT)
Dept: CARDIOLOGY CLINIC | Age: 43
End: 2023-06-19
Payer: COMMERCIAL

## 2023-06-19 VITALS
OXYGEN SATURATION: 97 % | DIASTOLIC BLOOD PRESSURE: 70 MMHG | HEART RATE: 87 BPM | BODY MASS INDEX: 40.49 KG/M2 | SYSTOLIC BLOOD PRESSURE: 102 MMHG | HEIGHT: 67 IN | WEIGHT: 258 LBS

## 2023-06-19 DIAGNOSIS — R07.2 PRECORDIAL PAIN: Primary | ICD-10-CM

## 2023-06-19 DIAGNOSIS — D50.0 IRON DEFICIENCY ANEMIA SECONDARY TO BLOOD LOSS (CHRONIC): ICD-10-CM

## 2023-06-19 DIAGNOSIS — I10 HTN, GOAL BELOW 140/90: ICD-10-CM

## 2023-06-19 DIAGNOSIS — G47.33 OSA (OBSTRUCTIVE SLEEP APNEA): ICD-10-CM

## 2023-06-19 PROCEDURE — 99214 OFFICE O/P EST MOD 30 MIN: CPT | Performed by: INTERNAL MEDICINE

## 2023-06-19 PROCEDURE — 3074F SYST BP LT 130 MM HG: CPT | Performed by: INTERNAL MEDICINE

## 2023-06-19 PROCEDURE — 3078F DIAST BP <80 MM HG: CPT | Performed by: INTERNAL MEDICINE

## 2023-06-19 NOTE — PROGRESS NOTES
CARDIOLOGY NOTE    Chief Complaint: chest pain     HPI:   Americo Nunez is a 44y.o. year old who has Past medical history as noted below. She reports she gets left shoulder pain when she is exerting or walking doing work out like steps started 3 weeks ago, The pain lasts for 10 -15 mins , it is 6/10 in intensity and resolves on its own   She did well during Cardiolite completed 10 mets but had apical thinning and scar? Vs artifact   She had cardiac ct in may 2023 showing no obstructive CAD    she has been off all her meds , she used to be  on metoprolol and  protonix , she had stress test in Sept 2021 on  treadmill she completed 7 METS and 6 Mins of exercise she has history of gastric sleeve surgery as well as hiatal hernia / BP is in low 100 to 90 but denies feeling dizzy or lightheaded  She is trying to get pregnant so she is taking fertility drugs   She is pre diabetic started metformin   . She had gastric sleeve surgery in 2016 , she had normal stress test in  2017 . She denies any family history cardiac problems but younger brother had stroke when he was 15 .  She denies any alcoholc or smoking       Current Outpatient Medications   Medication Sig Dispense Refill    nitroGLYCERIN (NITROSTAT) 0.4 MG SL tablet Place 1 tablet under the tongue every 5 minutes as needed for Chest pain 25 tablet 3    clomiPHENE (CLOMID) 50 MG tablet Take 2 tablets by mouth daily Days 3-7 10 tablet 5    Prenatal Vit-Fe Sulfate-FA-DHA (PRENATAL VITAMIN/MIN +DHA) 27-0.8-200 MG CAPS Take 1 tablet by mouth daily (may substitute any prenatal vitamin covered by insurance, ok for prenatal without DHA if DHA based prenatal is not covered) 90 capsule 3    metFORMIN (GLUCOPHAGE) 500 MG tablet Take 1 tablet by mouth 2 times daily (with meals)      letrozole (FEMARA) 2.5 MG tablet PLEASE SEE ATTACHED FOR DETAILED DIRECTIONS      Prenatal Vit-Fe Fumarate-FA (PRENATAL VITAMINS) 28-0.8 MG TABS Take 1 tablet

## 2023-06-19 NOTE — PATIENT INSTRUCTIONS
**It is YOUR responsibilty to bring medication bottles and/or updated medication list to [address] [place] [address]. This will allow us to better serve you and all your healthcare needs**    [facility] Laboratory Locations - No appointment necessary. Sites open Monday to Friday. Call your preferred location for test preparation, business   hours and other information you need. [email or web address] accepts [employer]. [address] [place], [address] [facility]  Phone: [phone]       Please be informed that if you contact our office outside of normal business hours the physician on call cannot help with any scheduling or rescheduling issues, procedure instruction questions or any type of medication issue. We advise you for any urgent/emergency that you go to the nearest emergency room! PLEASE CALL OUR OFFICE DURING NORMAL BUSINESS HOURS    Monday - Friday   8 am to 5 pm    [place]: [phone]    [place]:  [phone]    Thank you for allowing us to care for you today! We want to ensure we can follow your treatment plan and we strive to give you the best outcomes and experience possible. If you ever have a life threatening emergency and call 911 - for an ambulance (EMS)   Our providers can only care for you at:   [facility] or [facility]. Even if you have someone take you or you drive yourself we can only care for you in a [facility]. Our providers are not setup at the other healthcare locations!

## 2023-06-20 ENCOUNTER — OFFICE VISIT (OUTPATIENT)
Dept: FAMILY MEDICINE CLINIC | Age: 43
End: 2023-06-20
Payer: COMMERCIAL

## 2023-06-20 VITALS
HEIGHT: 67 IN | WEIGHT: 256.2 LBS | SYSTOLIC BLOOD PRESSURE: 138 MMHG | HEART RATE: 97 BPM | DIASTOLIC BLOOD PRESSURE: 60 MMHG | OXYGEN SATURATION: 92 % | TEMPERATURE: 97.1 F | BODY MASS INDEX: 40.21 KG/M2

## 2023-06-20 DIAGNOSIS — I10 HTN, GOAL BELOW 140/90: ICD-10-CM

## 2023-06-20 DIAGNOSIS — R07.9 CHEST PAIN ON EXERTION: ICD-10-CM

## 2023-06-20 DIAGNOSIS — E11.9 TYPE II DIABETES MELLITUS WITH HBA1C GOAL TO BE DETERMINED (HCC): Primary | ICD-10-CM

## 2023-06-20 DIAGNOSIS — D50.9 IRON DEFICIENCY ANEMIA, UNSPECIFIED IRON DEFICIENCY ANEMIA TYPE: ICD-10-CM

## 2023-06-20 DIAGNOSIS — M79.604 PAIN OF RIGHT LOWER EXTREMITY: ICD-10-CM

## 2023-06-20 LAB — HBA1C MFR BLD: 5.3 %

## 2023-06-20 PROCEDURE — 83037 HB GLYCOSYLATED A1C HOME DEV: CPT | Performed by: NURSE PRACTITIONER

## 2023-06-20 PROCEDURE — 3044F HG A1C LEVEL LT 7.0%: CPT | Performed by: NURSE PRACTITIONER

## 2023-06-20 PROCEDURE — 99214 OFFICE O/P EST MOD 30 MIN: CPT | Performed by: NURSE PRACTITIONER

## 2023-06-20 PROCEDURE — 3075F SYST BP GE 130 - 139MM HG: CPT | Performed by: NURSE PRACTITIONER

## 2023-06-20 PROCEDURE — 3078F DIAST BP <80 MM HG: CPT | Performed by: NURSE PRACTITIONER

## 2023-06-20 ASSESSMENT — ENCOUNTER SYMPTOMS
COUGH: 0
SHORTNESS OF BREATH: 0
WHEEZING: 0
CHEST TIGHTNESS: 0
GASTROINTESTINAL NEGATIVE: 1

## 2023-06-20 ASSESSMENT — PATIENT HEALTH QUESTIONNAIRE - PHQ9
SUM OF ALL RESPONSES TO PHQ9 QUESTIONS 1 & 2: 0
SUM OF ALL RESPONSES TO PHQ QUESTIONS 1-9: 0
1. LITTLE INTEREST OR PLEASURE IN DOING THINGS: 0
2. FEELING DOWN, DEPRESSED OR HOPELESS: 0
SUM OF ALL RESPONSES TO PHQ QUESTIONS 1-9: 0

## 2023-06-20 NOTE — PROGRESS NOTES
Glover Bianca  1980  44 y.o. SUBJECT CIRA:    Chief Complaint   Patient presents with    Follow-up     3 mon follow up    Diabetes     A1C    Varicose Veins     Pt reports right leg varicose vein has become larger and painful. MARIE Meredith is a 67-year-old female who is in to establish care. She gives past medical issues of pelvic pain, back pain, history of gastric sleeve surgery, JERMAINE, malabsorption of iron, chronic iron deficiency anemia, HTN, hiatal hernia, esophagostomy hemorrhage, headache, anxiety and chronic pain of both knee. She states she was a past patient of [facility]. She is seeing Dr. Melissa Michael for management of fertility. She states she had her first child at the age of 14, has 10 living children ranging in ages from 32 to 16 years. She states she had a tubal reversal and has had dye studies which show the tubes are opened. She states her current spouse has no biologic children and wants a child. Diabetes  She states she has been diabetic for about 6 years. When she was first diagnosed she was on insulin and other medications. She states following her gastric bypass surgery her hemoglobin A1c dropped and she was taken off of medication. She currently is on metformin and is compliant with medicine. Her A1c today is down to 5.3%. HTN  She states she was told she had high blood pressure years ago but never on medication. Her blood pressure today is good at 112/68. Chest pain  She had an appointment with cardiology yesterday and was encouraged to remain compliant with medications and ways to prevent ASCVD through exercise, medications and diet. She had GI procedure 5/30/23 and found to have gastritis. She states there was no evidence of bleeding to explain her chronic anemia. She follows up with hematology for the anemia.       Current Outpatient Medications on File Prior to Visit   Medication Sig Dispense Refill    nitroGLYCERIN (NITROSTAT) 0.4 MG SL tablet

## 2023-08-23 ENCOUNTER — APPOINTMENT (OUTPATIENT)
Dept: ULTRASOUND IMAGING | Age: 43
End: 2023-08-23
Payer: COMMERCIAL

## 2023-08-23 ENCOUNTER — HOSPITAL ENCOUNTER (EMERGENCY)
Age: 43
Discharge: HOME | End: 2023-08-23
Attending: EMERGENCY MEDICINE
Payer: COMMERCIAL

## 2023-08-23 VITALS
WEIGHT: 240 LBS | SYSTOLIC BLOOD PRESSURE: 136 MMHG | TEMPERATURE: 98.6 F | HEART RATE: 93 BPM | RESPIRATION RATE: 18 BRPM | DIASTOLIC BLOOD PRESSURE: 92 MMHG | BODY MASS INDEX: 37.59 KG/M2 | OXYGEN SATURATION: 99 %

## 2023-08-23 DIAGNOSIS — N84.0 ENDOMETRIAL POLYP: Primary | ICD-10-CM

## 2023-08-23 DIAGNOSIS — N85.00 ENDOMETRIAL HYPERPLASIA: ICD-10-CM

## 2023-08-23 LAB
BILIRUBIN URINE: NEGATIVE MG/DL
BLOOD, URINE: NEGATIVE
CLARITY: CLEAR
COLOR: YELLOW
COMMENT UA: ABNORMAL
GLUCOSE, URINE: NEGATIVE MG/DL
INTERPRETATION: NORMAL
KETONES, URINE: ABNORMAL MG/DL
LEUKOCYTE ESTERASE, URINE: NEGATIVE
NITRITE URINE, QUANTITATIVE: NEGATIVE
PH, URINE: 5.5 (ref 5–8)
PREGNANCY, URINE: NEGATIVE
PROTEIN UA: NEGATIVE MG/DL
SPECIFIC GRAVITY UA: >1.03 (ref 1–1.03)
UROBILINOGEN, URINE: 1 MG/DL (ref 0.2–1)

## 2023-08-23 PROCEDURE — 76830 TRANSVAGINAL US NON-OB: CPT

## 2023-08-23 PROCEDURE — 81025 URINE PREGNANCY TEST: CPT

## 2023-08-23 PROCEDURE — 99284 EMERGENCY DEPT VISIT MOD MDM: CPT

## 2023-08-23 PROCEDURE — 81003 URINALYSIS AUTO W/O SCOPE: CPT

## 2023-08-23 PROCEDURE — 93975 VASCULAR STUDY: CPT

## 2023-08-23 NOTE — DISCHARGE INSTRUCTIONS
Return to the ED if you have any vaginal discharge/bleeding, intractable abdominal pain, nausea/vomiting, lightheadedness, or any other symptom of concern.

## 2023-08-23 NOTE — ED PROVIDER NOTES
Patient endorsed to me by Dr. Ye Ibanez. See her note for details. 81-year-old female presenting to the ED with a history of left lower quadrant pain for the past 1 week. Patient has been evaluated by previous physician with unremarkable urinalysis and urine pregnancy test.  Urinalysis is pending. We will follow-up with patient's urinalysis and disposition as appropriate. 8:40 AM    Patient's ultrasound is suggestive of endometrial hyperplasia and endometrial polyp. Patient denies any significant abdominal pain, nausea or vomiting. Patient denies any anorexia or recent weight loss. No history of abnormal vaginal discharge or bleeding. Patient sees Dr. Marquez Blackman and is advised to follow-up with her obstetrician/gynecologist for hysteroscopy and sampling for further evaluation of possible endometrial malignancy. Patient also given return instructions to the ED in event of intractable abdominal pain, nausea/vomiting, lightheadedness, vaginal bleeding or discharge or any other symptom of concern. Patient is also given opportunity to ask questions and all questions were answered in appropriate detail. Patient verbalized understanding and agreement with the plan. EKG (if obtained): (All EKG's are interpreted by myself in the absence of a cardiologist)      Labs (if applicable):  Results for orders placed or performed during the hospital encounter of 08/23/23   Urine Preg (Lab)   Result Value Ref Range    Pregnancy, Urine NEGATIVE NEGATIVE    Interpretation       HCG Method Limitations: Very dilute specimens may have insufficient concentration of HCG to bring about a positive result.    Urinalysis   Result Value Ref Range    Color, UA YELLOW YELLOW    Clarity, UA CLEAR CLEAR    Glucose, Urine NEGATIVE NEGATIVE MG/DL    Bilirubin Urine NEGATIVE NEGATIVE MG/DL    Ketones, Urine TRACE (A) NEGATIVE MG/DL    Specific Gravity, UA >1.030 1.001 - 1.035    Blood, Urine NEGATIVE NEGATIVE    pH, Urine 5.5

## 2023-08-28 ENCOUNTER — OFFICE VISIT (OUTPATIENT)
Dept: OBGYN | Age: 43
End: 2023-08-28
Payer: COMMERCIAL

## 2023-08-28 VITALS
HEIGHT: 67 IN | BODY MASS INDEX: 39.55 KG/M2 | WEIGHT: 252 LBS | DIASTOLIC BLOOD PRESSURE: 84 MMHG | SYSTOLIC BLOOD PRESSURE: 125 MMHG

## 2023-08-28 DIAGNOSIS — N92.0 MENORRHAGIA WITH REGULAR CYCLE: Primary | ICD-10-CM

## 2023-08-28 DIAGNOSIS — N94.89 PELVIC PAIN SYNDROME: ICD-10-CM

## 2023-08-28 DIAGNOSIS — R93.89 THICKENED ENDOMETRIUM: ICD-10-CM

## 2023-08-28 LAB
DEPRECATED RDW RBC AUTO: 14.7 % (ref 12.4–15.4)
HCT VFR BLD AUTO: 31.1 % (ref 36–48)
HGB BLD-MCNC: 10 G/DL (ref 12–16)
MCH RBC QN AUTO: 25.8 PG (ref 26–34)
MCHC RBC AUTO-ENTMCNC: 32.1 G/DL (ref 31–36)
MCV RBC AUTO: 80.5 FL (ref 80–100)
PLATELET # BLD AUTO: 280 K/UL (ref 135–450)
PMV BLD AUTO: 8.9 FL (ref 5–10.5)
RBC # BLD AUTO: 3.86 M/UL (ref 4–5.2)
TSH SERPL DL<=0.005 MIU/L-ACNC: 2.35 UIU/ML (ref 0.27–4.2)
WBC # BLD AUTO: 5.2 K/UL (ref 4–11)

## 2023-08-28 PROCEDURE — 3079F DIAST BP 80-89 MM HG: CPT | Performed by: OBSTETRICS & GYNECOLOGY

## 2023-08-28 PROCEDURE — 3074F SYST BP LT 130 MM HG: CPT | Performed by: OBSTETRICS & GYNECOLOGY

## 2023-08-28 PROCEDURE — 99213 OFFICE O/P EST LOW 20 MIN: CPT | Performed by: OBSTETRICS & GYNECOLOGY

## 2023-08-29 NOTE — ED PROVIDER NOTES
CC: lower abdominal pain  Seen in triage    HPI: This is a 44 y.o. female with past medical history of of HLD and DM who come for for evaluation of abdominal pain. She has had the pain for about 1 week. No other symptoms - denied dysuria, hematuria, fever, vaginal discharge or diarrhea.        Past Medical History:   Diagnosis Date    Anemia     Anxiety     Arthritis     \"Left Hip\"    Back pain     Diabetes ([address]) Dx 2008    Hiatal hernia     Hyperlipidemia     \"They Put Me On Lipitor Because I'm Diabetic, I Have Never Had High Cholesterol\"    Infertility, female     Pelvic pain     Shoulder pain, left     Sleep apnea     Uses CPAP    Teeth missing     Upper    Type 2 diabetes mellitus without complication (HCC)     Wears glasses     Wears partial dentures     Upper     Past Surgical History:   Procedure Laterality Date    BREAST BIOPSY Bilateral 2006    Benign    COLONOSCOPY N/A 5/30/2023    COLONOSCOPY POLYPECTOMY SNARE/COLD BIOPSY performed by Dev Fernandez MD at [facility]      Teeth Extracted In The Past    ENDOSCOPY, COLON, DIAGNOSTIC  2017    [facility]  09/05/2017    Robotic sleeve gastrectomy and hiatal hernia repair    TUBAL LIGATION  2005    TUBAL LIGATION      reversal    UPPER GASTROINTESTINAL ENDOSCOPY N/A 5/30/2023    EGD BIOPSY performed by Dev Fernandez MD at [facility] History     Socioeconomic History    Marital status:      Spouse name: Not on file    Number of children: Not on file    Years of education: Not on file    Highest education level: Not on file   Occupational History    Not on file   Tobacco Use    Smoking status: Former     Packs/day: 0.50     Years: 13.00     Pack years: 6.50     Types: Cigarettes     Start date: 19     Quit date: 2008     Years since quitting: 15.6    Smokeless tobacco: Never   Vaping Use    Vaping Use: Never used   Substance and Sexual Activity    Alcohol use: Not Currently     Comment: Caffeine: 3cups

## 2023-09-05 ENCOUNTER — OFFICE VISIT (OUTPATIENT)
Dept: OBGYN | Age: 43
End: 2023-09-05
Payer: COMMERCIAL

## 2023-09-05 VITALS
WEIGHT: 252 LBS | BODY MASS INDEX: 39.55 KG/M2 | DIASTOLIC BLOOD PRESSURE: 88 MMHG | HEIGHT: 67 IN | SYSTOLIC BLOOD PRESSURE: 128 MMHG

## 2023-09-05 DIAGNOSIS — R10.2 CHRONIC FEMALE PELVIC PAIN: ICD-10-CM

## 2023-09-05 DIAGNOSIS — N92.1 MENOMETRORRHAGIA: Primary | ICD-10-CM

## 2023-09-05 DIAGNOSIS — G89.29 CHRONIC FEMALE PELVIC PAIN: ICD-10-CM

## 2023-09-05 DIAGNOSIS — D50.0 IRON DEFICIENCY ANEMIA DUE TO CHRONIC BLOOD LOSS: ICD-10-CM

## 2023-09-05 PROCEDURE — 3074F SYST BP LT 130 MM HG: CPT | Performed by: OBSTETRICS & GYNECOLOGY

## 2023-09-05 PROCEDURE — 99213 OFFICE O/P EST LOW 20 MIN: CPT | Performed by: OBSTETRICS & GYNECOLOGY

## 2023-09-05 PROCEDURE — 3079F DIAST BP 80-89 MM HG: CPT | Performed by: OBSTETRICS & GYNECOLOGY

## 2023-09-05 ASSESSMENT — ENCOUNTER SYMPTOMS
ALLERGIC/IMMUNOLOGIC NEGATIVE: 1
EYES NEGATIVE: 1
RESPIRATORY NEGATIVE: 1
GASTROINTESTINAL NEGATIVE: 1

## 2023-09-06 NOTE — PROGRESS NOTES
Patient informed and scheduled for pre-op, surgery 9/13/23 10:30am
Per Erik prior auth required for [id number]  Approved  Auth # [id number]  9/6/23-3/4/24  Ready to schedule
Lymphocytes Absolute 2.6 K/CU MM   Monocytes Absolute 0.5 K/CU MM   Eosinophils Absolute 0.1 K/CU MM   Basophils Absolute 0.1 K/CU MM          04/12/2023 1113 04/12/2023 1417 CBC with Auto Differential [[id number]]   (Abnormal)   Blood    Component Value Units   WBC 5.0 K/CU MM   RBC 4.15 Low  M/CU MM   Hemoglobin 9.5 Low  GM/DL   Hematocrit 31.6 Low  %   MCV 76.1 Low  FL   MCH 22.9 Low  PG   MCHC 30.1 Low  %   RDW 16.9 High  %   Platelets ADEQUATE  K/CU MM   MPV INSTRUMENT UNABLE TO MEASURE OR CALCULATE. FL   Differential Type AUTOMATED DIFFERENTIAL    Segs Relative 52.6 %   Lymphocytes % 35.1 %   Monocytes % 8.7 High  %   Eosinophils % 2.8 %   Basophils % 0.8 %   Segs Absolute 2.6 K/CU MM   Lymphocytes Absolute 1.8 C  K/CU MM   Monocytes Absolute 0.4 K/CU MM   Eosinophils Absolute 0.1 K/CU MM   Basophils Absolute 0.0 K/CU MM   PLT Morphology RARE FIBRIN STRANDS PRESENT            04/12/2023 1113 04/12/2023 1317 Ferritin [[id number]]   (Abnormal)   Blood    Component Value Units   Ferritin 5 Low  NG/ML          04/12/2023 1113 04/12/2023 1300 Iron and TIBC [[id number]]   (Abnormal)   Blood    Component Value Units   Iron 21 Low  ug/dL   UIBC 389 High  ug/dL   TIBC 410 ug/dL   Transferrin % 5 Low  %              See ultrasound report  8/23    No results found for this visit on 09/05/23. ASSESSMENT AND PLAN   Diagnosis Orders   1. Menometrorrhagia        2. Iron deficiency anemia due to chronic blood loss        3. Chronic female pelvic pain        Discussed options of hormonal options versus hysteroscopy D&C with laparoscopy. Considering options. Return in about 2 weeks (around 9/19/2023).     Kisahn Silva MD

## 2023-09-07 ENCOUNTER — OFFICE VISIT (OUTPATIENT)
Dept: OBGYN | Age: 43
End: 2023-09-07
Payer: COMMERCIAL

## 2023-09-07 VITALS
DIASTOLIC BLOOD PRESSURE: 84 MMHG | SYSTOLIC BLOOD PRESSURE: 126 MMHG | BODY MASS INDEX: 39.55 KG/M2 | HEIGHT: 67 IN | WEIGHT: 252 LBS

## 2023-09-07 DIAGNOSIS — D50.0 IRON DEFICIENCY ANEMIA DUE TO CHRONIC BLOOD LOSS: ICD-10-CM

## 2023-09-07 DIAGNOSIS — G89.29 CHRONIC FEMALE PELVIC PAIN: ICD-10-CM

## 2023-09-07 DIAGNOSIS — N92.1 MENOMETRORRHAGIA: Primary | ICD-10-CM

## 2023-09-07 DIAGNOSIS — R10.2 CHRONIC FEMALE PELVIC PAIN: ICD-10-CM

## 2023-09-07 PROCEDURE — 3074F SYST BP LT 130 MM HG: CPT | Performed by: OBSTETRICS & GYNECOLOGY

## 2023-09-07 PROCEDURE — 99213 OFFICE O/P EST LOW 20 MIN: CPT | Performed by: OBSTETRICS & GYNECOLOGY

## 2023-09-07 PROCEDURE — 3079F DIAST BP 80-89 MM HG: CPT | Performed by: OBSTETRICS & GYNECOLOGY

## 2023-09-08 NOTE — PROGRESS NOTES
9/8/23 - Unable to leave a message on patient's phone, voice mail not set up. I talked to her daughter who will have the patient call the PAT Nurse.

## 2023-09-12 ENCOUNTER — ANESTHESIA EVENT (OUTPATIENT)
Dept: OPERATING ROOM | Age: 43
End: 2023-09-12
Payer: COMMERCIAL

## 2023-09-12 NOTE — ANESTHESIA PRE PROCEDURE
Department of Anesthesiology  Preprocedure Note       Name:  Rg Godoy   Age:  44 y.o.  :  1980                                          MRN:  [id number]         Date:  2023      Surgeon: Kalee Alvarado):  Shani Walters MD    Procedure: Procedure(s):  DILATATION AND CURETTAGE HYSTEROSCOPY  LAPAROSCOPY EXPLORATORY  LAPAROSCOPY CHROMOPERTUBATION    Medications prior to admission:   Prior to Admission medications    Medication Sig Start Date End Date Taking?  Authorizing Provider   metFORMIN (GLUCOPHAGE) 500 MG tablet Take 1 tablet by mouth 2 times daily (with meals)  Patient not taking: Reported on 2023   Elizabeth Stern, APRN - CNP   metoprolol tartrate (LOPRESSOR) 25 MG tablet Take 1 tablet by mouth 2 times daily Take 1 tab the morning of the test and 2nd tab at the time of the test 23   Pao Eaton MD   nitroGLYCERIN (NITROSTAT) 0.4 MG SL tablet Place 1 tablet under the tongue every 5 minutes as needed for Chest pain 3/27/23   Pao Eaton MD   clomiPHENE (CLOMID) 50 MG tablet Take 2 tablets by mouth daily Days 3-7  Patient not taking: Reported on 2023   Shani Walters MD   Prenatal Vit-Fe Sulfate-FA-DHA (PRENATAL VITAMIN/MIN +DHA) 27-0.8-200 MG CAPS Take 1 tablet by mouth daily (may substitute any prenatal vitamin covered by insurance, ok for prenatal without DHA if  [facility] based prenatal is not covered)  Patient not taking: Reported on 2023   Shani Walters MD   letrozole [facility]) 2.5 MG tablet PLEASE SEE ATTACHED FOR DETAILED DIRECTIONS  Patient not taking: Reported on 2023 10/5/22   Historical Provider, MD   Prenatal Vit-Fe Fumarate-FA (PRENATAL VITAMINS) 28-0.8 MG TABS Take 1 tablet by mouth daily 22   Mariam Schmitt PA-C   ferrous sulfate dried (SLOW FE) 160 (50 Fe) MG TBCR extended release tablet Take 160 mg by mouth 2 times daily  Patient not taking: Reported on 2023   Mariam Schmitt PA-C

## 2023-09-13 ENCOUNTER — ANESTHESIA (OUTPATIENT)
Dept: OPERATING ROOM | Age: 43
End: 2023-09-13
Payer: COMMERCIAL

## 2023-09-13 ENCOUNTER — HOSPITAL ENCOUNTER (OUTPATIENT)
Age: 43
Setting detail: OUTPATIENT SURGERY
Discharge: HOME | End: 2023-09-13
Attending: OBSTETRICS & GYNECOLOGY | Admitting: OBSTETRICS & GYNECOLOGY
Payer: COMMERCIAL

## 2023-09-13 VITALS
BODY MASS INDEX: 39.55 KG/M2 | HEIGHT: 67 IN | OXYGEN SATURATION: 100 % | TEMPERATURE: 97.2 F | WEIGHT: 252 LBS | SYSTOLIC BLOOD PRESSURE: 100 MMHG | DIASTOLIC BLOOD PRESSURE: 65 MMHG | HEART RATE: 88 BPM | RESPIRATION RATE: 18 BRPM

## 2023-09-13 DIAGNOSIS — G89.29 CHRONIC FEMALE PELVIC PAIN: ICD-10-CM

## 2023-09-13 DIAGNOSIS — D50.0 IRON DEFICIENCY ANEMIA SECONDARY TO BLOOD LOSS (CHRONIC): ICD-10-CM

## 2023-09-13 DIAGNOSIS — G89.18 POST-OP PAIN: Primary | ICD-10-CM

## 2023-09-13 DIAGNOSIS — N92.1 MENOMETRORRHAGIA: ICD-10-CM

## 2023-09-13 DIAGNOSIS — R10.2 CHRONIC FEMALE PELVIC PAIN: ICD-10-CM

## 2023-09-13 LAB
ANION GAP SERPL CALCULATED.3IONS-SCNC: 9 MMOL/L (ref 4–16)
BASOPHILS ABSOLUTE: 0.1 K/CU MM
BASOPHILS RELATIVE PERCENT: 0.8 % (ref 0–1)
BUN SERPL-MCNC: 10 MG/DL (ref 6–23)
CALCIUM SERPL-MCNC: 8.4 MG/DL (ref 8.3–10.6)
CHLORIDE BLD-SCNC: 105 MMOL/L (ref 99–110)
CO2: 24 MMOL/L (ref 21–32)
CREAT SERPL-MCNC: 0.6 MG/DL (ref 0.6–1.1)
DIFFERENTIAL TYPE: ABNORMAL
EOSINOPHILS ABSOLUTE: 0.1 K/CU MM
EOSINOPHILS RELATIVE PERCENT: 1.9 % (ref 0–3)
GFR SERPL CREATININE-BSD FRML MDRD: >60 ML/MIN/1.73M2
GLUCOSE SERPL-MCNC: 90 MG/DL (ref 70–99)
HCT VFR BLD CALC: 29.6 % (ref 37–47)
HEMOGLOBIN: 8.9 GM/DL (ref 12.5–16)
IMMATURE NEUTROPHIL %: 0.2 % (ref 0–0.43)
LYMPHOCYTES ABSOLUTE: 2.6 K/CU MM
LYMPHOCYTES RELATIVE PERCENT: 42.3 % (ref 24–44)
MCH RBC QN AUTO: 24.3 PG (ref 27–31)
MCHC RBC AUTO-ENTMCNC: 30.1 % (ref 32–36)
MCV RBC AUTO: 80.7 FL (ref 78–100)
MONOCYTES ABSOLUTE: 0.6 K/CU MM
MONOCYTES RELATIVE PERCENT: 9 % (ref 0–4)
NUCLEATED RBC %: 0 %
PDW BLD-RTO: 13.8 % (ref 11.7–14.9)
PLATELET # BLD: 255 K/CU MM (ref 140–440)
PMV BLD AUTO: 10 FL (ref 7.5–11.1)
POTASSIUM SERPL-SCNC: 3.5 MMOL/L (ref 3.5–5.1)
RBC # BLD: 3.67 M/CU MM (ref 4.2–5.4)
SEGMENTED NEUTROPHILS ABSOLUTE COUNT: 2.9 K/CU MM
SEGMENTED NEUTROPHILS RELATIVE PERCENT: 45.8 % (ref 36–66)
SODIUM BLD-SCNC: 138 MMOL/L (ref 135–145)
TOTAL IMMATURE NEUTOROPHIL: 0.01 K/CU MM
TOTAL NUCLEATED RBC: 0 K/CU MM
WBC # BLD: 6.2 K/CU MM (ref 4–10.5)

## 2023-09-13 PROCEDURE — 88305 TISSUE EXAM BY PATHOLOGIST: CPT | Performed by: PATHOLOGY

## 2023-09-13 PROCEDURE — 6360000002 HC RX W HCPCS: Performed by: ANESTHESIOLOGY

## 2023-09-13 PROCEDURE — 2720000010 HC SURG SUPPLY STERILE: Performed by: OBSTETRICS & GYNECOLOGY

## 2023-09-13 PROCEDURE — 6360000002 HC RX W HCPCS: Performed by: OBSTETRICS & GYNECOLOGY

## 2023-09-13 PROCEDURE — 7100000010 HC PHASE II RECOVERY - FIRST 15 MIN: Performed by: OBSTETRICS & GYNECOLOGY

## 2023-09-13 PROCEDURE — 80048 BASIC METABOLIC PNL TOTAL CA: CPT

## 2023-09-13 PROCEDURE — 58662 LAPAROSCOPY EXCISE LESIONS: CPT | Performed by: OBSTETRICS & GYNECOLOGY

## 2023-09-13 PROCEDURE — 6370000000 HC RX 637 (ALT 250 FOR IP): Performed by: OBSTETRICS & GYNECOLOGY

## 2023-09-13 PROCEDURE — 3600000004 HC SURGERY LEVEL 4 BASE: Performed by: OBSTETRICS & GYNECOLOGY

## 2023-09-13 PROCEDURE — 88342 IMHCHEM/IMCYTCHM 1ST ANTB: CPT | Performed by: PATHOLOGY

## 2023-09-13 PROCEDURE — 58558 HYSTEROSCOPY BIOPSY: CPT | Performed by: OBSTETRICS & GYNECOLOGY

## 2023-09-13 PROCEDURE — 2500000003 HC RX 250 WO HCPCS: Performed by: OBSTETRICS & GYNECOLOGY

## 2023-09-13 PROCEDURE — 2580000003 HC RX 258: Performed by: ANESTHESIOLOGY

## 2023-09-13 PROCEDURE — 2580000003 HC RX 258: Performed by: OBSTETRICS & GYNECOLOGY

## 2023-09-13 PROCEDURE — 2500000003 HC RX 250 WO HCPCS: Performed by: NURSE ANESTHETIST, CERTIFIED REGISTERED

## 2023-09-13 PROCEDURE — 88341 IMHCHEM/IMCYTCHM EA ADD ANTB: CPT | Performed by: PATHOLOGY

## 2023-09-13 PROCEDURE — 7100000011 HC PHASE II RECOVERY - ADDTL 15 MIN: Performed by: OBSTETRICS & GYNECOLOGY

## 2023-09-13 PROCEDURE — 85025 COMPLETE CBC W/AUTO DIFF WBC: CPT

## 2023-09-13 PROCEDURE — 6360000002 HC RX W HCPCS: Performed by: NURSE ANESTHETIST, CERTIFIED REGISTERED

## 2023-09-13 PROCEDURE — 3700000000 HC ANESTHESIA ATTENDED CARE: Performed by: OBSTETRICS & GYNECOLOGY

## 2023-09-13 PROCEDURE — 3700000001 HC ADD 15 MINUTES (ANESTHESIA): Performed by: OBSTETRICS & GYNECOLOGY

## 2023-09-13 PROCEDURE — 7100000001 HC PACU RECOVERY - ADDTL 15 MIN: Performed by: OBSTETRICS & GYNECOLOGY

## 2023-09-13 PROCEDURE — 3600000014 HC SURGERY LEVEL 4 ADDTL 15MIN: Performed by: OBSTETRICS & GYNECOLOGY

## 2023-09-13 PROCEDURE — 2709999900 HC NON-CHARGEABLE SUPPLY: Performed by: OBSTETRICS & GYNECOLOGY

## 2023-09-13 PROCEDURE — 7100000000 HC PACU RECOVERY - FIRST 15 MIN: Performed by: OBSTETRICS & GYNECOLOGY

## 2023-09-13 RX ORDER — ACETAMINOPHEN 500 MG
1000 TABLET ORAL ONCE
Status: COMPLETED | OUTPATIENT
Start: 2023-09-13 | End: 2023-09-13

## 2023-09-13 RX ORDER — PROPOFOL 10 MG/ML
INJECTION, EMULSION INTRAVENOUS PRN
Status: DISCONTINUED | OUTPATIENT
Start: 2023-09-13 | End: 2023-09-13 | Stop reason: SDUPTHER

## 2023-09-13 RX ORDER — LABETALOL HYDROCHLORIDE 5 MG/ML
10 INJECTION, SOLUTION INTRAVENOUS
Status: DISCONTINUED | OUTPATIENT
Start: 2023-09-13 | End: 2023-09-13 | Stop reason: HOSPADM

## 2023-09-13 RX ORDER — DEXAMETHASONE SODIUM PHOSPHATE 4 MG/ML
INJECTION, SOLUTION INTRA-ARTICULAR; INTRALESIONAL; INTRAMUSCULAR; INTRAVENOUS; SOFT TISSUE PRN
Status: DISCONTINUED | OUTPATIENT
Start: 2023-09-13 | End: 2023-09-13 | Stop reason: SDUPTHER

## 2023-09-13 RX ORDER — METHYLENE BLUE 10 MG/ML
INJECTION INTRAVENOUS
Status: COMPLETED | OUTPATIENT
Start: 2023-09-13 | End: 2023-09-13

## 2023-09-13 RX ORDER — SODIUM CHLORIDE 0.9 % (FLUSH) 0.9 %
5-40 SYRINGE (ML) INJECTION PRN
Status: DISCONTINUED | OUTPATIENT
Start: 2023-09-13 | End: 2023-09-13 | Stop reason: HOSPADM

## 2023-09-13 RX ORDER — SODIUM CHLORIDE 0.9 % (FLUSH) 0.9 %
5-40 SYRINGE (ML) INJECTION EVERY 12 HOURS SCHEDULED
Status: DISCONTINUED | OUTPATIENT
Start: 2023-09-13 | End: 2023-09-13 | Stop reason: HOSPADM

## 2023-09-13 RX ORDER — FENTANYL CITRATE 50 UG/ML
50 INJECTION, SOLUTION INTRAMUSCULAR; INTRAVENOUS EVERY 5 MIN PRN
Status: DISCONTINUED | OUTPATIENT
Start: 2023-09-13 | End: 2023-09-13 | Stop reason: HOSPADM

## 2023-09-13 RX ORDER — SODIUM CHLORIDE 9 MG/ML
INJECTION, SOLUTION INTRAVENOUS CONTINUOUS PRN
Status: DISCONTINUED | OUTPATIENT
Start: 2023-09-13 | End: 2023-09-13 | Stop reason: HOSPADM

## 2023-09-13 RX ORDER — HYDRALAZINE HYDROCHLORIDE 20 MG/ML
10 INJECTION INTRAMUSCULAR; INTRAVENOUS
Status: DISCONTINUED | OUTPATIENT
Start: 2023-09-13 | End: 2023-09-13 | Stop reason: HOSPADM

## 2023-09-13 RX ORDER — DROPERIDOL 2.5 MG/ML
0.62 INJECTION, SOLUTION INTRAMUSCULAR; INTRAVENOUS
Status: DISCONTINUED | OUTPATIENT
Start: 2023-09-13 | End: 2023-09-13 | Stop reason: HOSPADM

## 2023-09-13 RX ORDER — SODIUM CHLORIDE, SODIUM LACTATE, POTASSIUM CHLORIDE, CALCIUM CHLORIDE 600; 310; 30; 20 MG/100ML; MG/100ML; MG/100ML; MG/100ML
INJECTION, SOLUTION INTRAVENOUS CONTINUOUS
Status: DISCONTINUED | OUTPATIENT
Start: 2023-09-13 | End: 2023-09-13 | Stop reason: HOSPADM

## 2023-09-13 RX ORDER — CELECOXIB 200 MG/1
200 CAPSULE ORAL ONCE
Status: COMPLETED | OUTPATIENT
Start: 2023-09-13 | End: 2023-09-13

## 2023-09-13 RX ORDER — SODIUM CHLORIDE 9 MG/ML
INJECTION, SOLUTION INTRAVENOUS CONTINUOUS
Status: DISCONTINUED | OUTPATIENT
Start: 2023-09-13 | End: 2023-09-13 | Stop reason: HOSPADM

## 2023-09-13 RX ORDER — ROCURONIUM BROMIDE 10 MG/ML
INJECTION, SOLUTION INTRAVENOUS PRN
Status: DISCONTINUED | OUTPATIENT
Start: 2023-09-13 | End: 2023-09-13 | Stop reason: SDUPTHER

## 2023-09-13 RX ORDER — OXYCODONE HYDROCHLORIDE 5 MG/1
5 TABLET ORAL
Status: DISCONTINUED | OUTPATIENT
Start: 2023-09-13 | End: 2023-09-13 | Stop reason: HOSPADM

## 2023-09-13 RX ORDER — ONDANSETRON 2 MG/ML
4 INJECTION INTRAMUSCULAR; INTRAVENOUS
Status: COMPLETED | OUTPATIENT
Start: 2023-09-13 | End: 2023-09-13

## 2023-09-13 RX ORDER — ONDANSETRON 2 MG/ML
INJECTION INTRAMUSCULAR; INTRAVENOUS PRN
Status: DISCONTINUED | OUTPATIENT
Start: 2023-09-13 | End: 2023-09-13 | Stop reason: SDUPTHER

## 2023-09-13 RX ORDER — BUPIVACAINE HYDROCHLORIDE AND EPINEPHRINE 5; 5 MG/ML; UG/ML
INJECTION, SOLUTION EPIDURAL; INTRACAUDAL; PERINEURAL
Status: COMPLETED | OUTPATIENT
Start: 2023-09-13 | End: 2023-09-13

## 2023-09-13 RX ORDER — IBUPROFEN 800 MG/1
800 TABLET ORAL EVERY 8 HOURS PRN
Qty: 60 TABLET | Refills: 2 | Status: SHIPPED | OUTPATIENT
Start: 2023-09-13

## 2023-09-13 RX ORDER — SODIUM CHLORIDE 9 MG/ML
INJECTION, SOLUTION INTRAVENOUS PRN
Status: DISCONTINUED | OUTPATIENT
Start: 2023-09-13 | End: 2023-09-13 | Stop reason: HOSPADM

## 2023-09-13 RX ORDER — MEPERIDINE HYDROCHLORIDE 25 MG/ML
12.5 INJECTION INTRAMUSCULAR; INTRAVENOUS; SUBCUTANEOUS EVERY 5 MIN PRN
Status: DISCONTINUED | OUTPATIENT
Start: 2023-09-13 | End: 2023-09-13 | Stop reason: HOSPADM

## 2023-09-13 RX ORDER — LIDOCAINE HYDROCHLORIDE 20 MG/ML
INJECTION, SOLUTION EPIDURAL; INFILTRATION; INTRACAUDAL; PERINEURAL PRN
Status: DISCONTINUED | OUTPATIENT
Start: 2023-09-13 | End: 2023-09-13 | Stop reason: SDUPTHER

## 2023-09-13 RX ORDER — HYDROCODONE BITARTRATE AND ACETAMINOPHEN 5; 325 MG/1; MG/1
1 TABLET ORAL EVERY 6 HOURS PRN
Qty: 20 TABLET | Refills: 0 | Status: SHIPPED | OUTPATIENT
Start: 2023-09-13 | End: 2023-09-18

## 2023-09-13 RX ORDER — FENTANYL CITRATE 50 UG/ML
INJECTION, SOLUTION INTRAMUSCULAR; INTRAVENOUS PRN
Status: DISCONTINUED | OUTPATIENT
Start: 2023-09-13 | End: 2023-09-13 | Stop reason: SDUPTHER

## 2023-09-13 RX ADMIN — SUGAMMADEX 200 MG: 100 INJECTION, SOLUTION INTRAVENOUS at 11:33

## 2023-09-13 RX ADMIN — FENTANYL CITRATE 100 MCG: 50 INJECTION, SOLUTION INTRAMUSCULAR; INTRAVENOUS at 10:36

## 2023-09-13 RX ADMIN — ONDANSETRON 4 MG: 2 INJECTION INTRAMUSCULAR; INTRAVENOUS at 12:32

## 2023-09-13 RX ADMIN — CELECOXIB 200 MG: 200 CAPSULE ORAL at 09:00

## 2023-09-13 RX ADMIN — PROPOFOL 150 MG: 10 INJECTION, EMULSION INTRAVENOUS at 10:36

## 2023-09-13 RX ADMIN — HYDROMORPHONE HYDROCHLORIDE 0.5 MG: 1 INJECTION, SOLUTION INTRAMUSCULAR; INTRAVENOUS; SUBCUTANEOUS at 11:33

## 2023-09-13 RX ADMIN — MEPERIDINE HYDROCHLORIDE 12.5 MG: 25 INJECTION, SOLUTION INTRAMUSCULAR; INTRAVENOUS; SUBCUTANEOUS at 11:59

## 2023-09-13 RX ADMIN — ACETAMINOPHEN 1000 MG: 500 TABLET ORAL at 09:00

## 2023-09-13 RX ADMIN — SODIUM CHLORIDE, POTASSIUM CHLORIDE, SODIUM LACTATE AND CALCIUM CHLORIDE: 600; 310; 30; 20 INJECTION, SOLUTION INTRAVENOUS at 09:03

## 2023-09-13 RX ADMIN — DEXAMETHASONE SODIUM PHOSPHATE 4 MG: 4 INJECTION, SOLUTION INTRAMUSCULAR; INTRAVENOUS at 10:47

## 2023-09-13 RX ADMIN — LIDOCAINE HYDROCHLORIDE 100 MG: 20 INJECTION, SOLUTION EPIDURAL; INFILTRATION; INTRACAUDAL; PERINEURAL at 10:36

## 2023-09-13 RX ADMIN — FENTANYL CITRATE 50 MCG: 50 INJECTION, SOLUTION INTRAMUSCULAR; INTRAVENOUS at 11:55

## 2023-09-13 RX ADMIN — ONDANSETRON 4 MG: 2 INJECTION INTRAMUSCULAR; INTRAVENOUS at 11:23

## 2023-09-13 RX ADMIN — HYDROMORPHONE HYDROCHLORIDE 0.5 MG: 1 INJECTION, SOLUTION INTRAMUSCULAR; INTRAVENOUS; SUBCUTANEOUS at 11:32

## 2023-09-13 RX ADMIN — ROCURONIUM BROMIDE 50 MG: 10 INJECTION, SOLUTION INTRAVENOUS at 10:36

## 2023-09-13 ASSESSMENT — PAIN DESCRIPTION - ORIENTATION: ORIENTATION: RIGHT

## 2023-09-13 ASSESSMENT — PAIN SCALES - GENERAL
PAINLEVEL_OUTOF10: 0
PAINLEVEL_OUTOF10: 6

## 2023-09-13 ASSESSMENT — PAIN DESCRIPTION - LOCATION: LOCATION: ABDOMEN

## 2023-09-13 ASSESSMENT — PAIN - FUNCTIONAL ASSESSMENT
PAIN_FUNCTIONAL_ASSESSMENT: NONE - DENIES PAIN
PAIN_FUNCTIONAL_ASSESSMENT: ACTIVITIES ARE NOT PREVENTED

## 2023-09-13 ASSESSMENT — PAIN DESCRIPTION - DESCRIPTORS: DESCRIPTORS: ACHING;DISCOMFORT;CRAMPING

## 2023-09-13 NOTE — ANESTHESIA PRE PROCEDURE
Department of Anesthesiology  Preprocedure Note       Name:  Dany Clark   Age:  44 y.o.  :  1980                                          MRN:  [id number]         Date:  2023      Surgeon: Len Blankenship):  Maury Devlin MD    Procedure: Procedure(s):  DILATATION AND CURETTAGE HYSTEROSCOPY  LAPAROSCOPY EXPLORATORY  LAPAROSCOPY CHROMOPERTUBATION    Medications prior to admission:   Prior to Admission medications    Medication Sig Start Date End Date Taking?  Authorizing Provider   metFORMIN (GLUCOPHAGE) 500 MG tablet Take 1 tablet by mouth 2 times daily (with meals)  Patient not taking: Reported on 2023   GRIS Baumann - CNP   nitroGLYCERIN (NITROSTAT) 0.4 MG SL tablet Place 1 tablet under the tongue every 5 minutes as needed for Chest pain 3/27/23   Krystina Valadez MD   clomiPHENE (CLOMID) 50 MG tablet Take 2 tablets by mouth daily Days 3-7  Patient not taking: Reported on 2023   Maury Devlin MD   Prenatal Vit-Fe Sulfate-FA-DHA (PRENATAL VITAMIN/MIN +DHA) 27-0.8-200 MG CAPS Take 1 tablet by mouth daily (may substitute any prenatal vitamin covered by insurance, ok for prenatal without DHA if  [facility] based prenatal is not covered)  Patient not taking: Reported on 2023   Maury Devlin MD   letrozole [facility]) 2.5 MG tablet PLEASE SEE ATTACHED FOR DETAILED DIRECTIONS  Patient not taking: Reported on 2023 10/5/22   Historical Provider, MD   Prenatal Vit-Fe Fumarate-FA (PRENATAL VITAMINS) 28-0.8 MG TABS Take 1 tablet by mouth daily 22   Halima Weston PA-C   ferrous sulfate dried (SLOW FE) 160 (50 Fe) MG TBCR extended release tablet Take 160 mg by mouth 2 times daily  Patient not taking: Reported on 2023   Halima Weston PA-C   vitamin D3 (CHOLECALCIFEROL) 25 MCG (1000 UT) TABS tablet Take 1 tablet by mouth daily  Patient not taking: Reported on 2023   Emile Roche MD   Multiple

## 2023-09-13 NOTE — H&P
Timur Moran  1980  Primary Care Physician: Ramses Medrano, APRN - [address]: [facility]    HPI: Timur Moran is a 44 y.o. female [id number] with chronic female pelvic pain and AUB. No diagnosis found.    Patient Active Problem List   Diagnosis    Family history of DVT    TIA (stress urinary incontinence, female)    JERMAINE (obstructive sleep apnea)    Anxiety    Chronic pain of both knees    Episodic tension-type headache, not intractable    Morbid obesity with BMI of 40.0-44.9, adult ([address] [facility])    Esophagostomy hemorrhage (HCC)    HTN, goal below 140/90    Moderate obstructive sleep apnea    Hiatal hernia    Status post bariatric surgery    Status post laparoscopic sleeve gastrectomy    Weight loss    Iron deficiency anemia    Malabsorption of iron    Precordial pain    Iron deficiency anemia secondary to blood loss (chronic)    Irregular menses    Pelvic pain    Back pain    Irritable bowel syndrome with constipation and diarrhea       OB History    Para Term  AB Living   7 7 7 0 0 6   SAB IAB Ectopic Molar Multiple Live Births   0 0 0 0 0 7      # Outcome Date GA Lbr Sidney/2nd Weight Sex Delivery Anes PTL Lv   7 Term 05    F Vag-Spont   BRENT   6 Term 04    M Vag-Spont   BRENT   5 Term 04/10/02    F Vag-Spont   BRENT   4 Term 01    F Vag-Spont   BRENT   3 Term 00    M Vag-Spont   DEC   2 Term 97    F Vag-Spont   BRENT   1 Term 96    F Vag-Spont   BRENT     Past Medical History:   Diagnosis Date    Anemia     Anxiety     Arthritis     \"Left Hip\"    Back pain     Hiatal hernia     Hyperlipidemia     \"They Put Me On Lipitor Because I'm Diabetic, I Have Never Had High Cholesterol\"    Infertility, female     Pelvic pain     Shoulder pain, left     Sleep apnea     States does not have as of 23 - since losing weight    Teeth missing     Upper    Type 2 diabetes mellitus without complication ([address] [facility])     States does not have as of 23 - reviewed in detail including infection, hemorrhage, blood transfusion, injury to organs such as bowel, bladder and neurovascular structures with the possibility of a prolonged postoperative course. The patients orders, labs, consents have been completed. The patient is aware that this procedure may not alleviate her symptoms. That there may be a necessity for a second surgery and that there may be an incomplete removal of abnormal tissue.

## 2023-09-13 NOTE — OP NOTE
Department of Gynecology  Operative Report        Pre-operative Diagnosis: Chronic female pelvic pain, menometrorrhagia    Post-operative Diagnosis: Same, exophytic right ovarian cyst, left paratubal cyst    Procedure: Laparoscopy excision of adnexal lesions, hysteroscopy with dilation and curettage, chromopertubation of the fallopian tubes    Surgeon: Dr. Saira Panchal     Assistant(s): None    Anesthesia:  General Endotracheal Anesthesia    Findings: Normal upper abdominal survey. Normal liver edge. Normal appendix. Uterus is enlarged globular consistent with probable adenomyosis. The ovaries were normal with a exophytic right ovarian cyst on a stalk which was removed. The fallopian tubes had normal fimbriated ends. You could visualize the previous tubal ligation reversal site bilaterally. There was a left paratubal cyst.  Chromopertubation showed prompt fill and spill of the right fallopian tube. Delayed spill with pressure placed on the syringe of the left pinky. Probable stenosis of the mid section of the left fallopian tube. Hysteroscopy revealed 2 endometrial polyps were removed. Otherwise there is no evidence of a submucosal myoma. Estimated blood loss: 1 cc    Blood Transfusion?:  No     Drains: None    Specimens: 1 exophytic right ovarian cyst 2. Left paratubal cyst 3. Endocervical curettings 4. Endometrial curettings 5. Endometrial polyp    Complications: None    Condition: Stable    Narrative operative report: After informed consent was obtained, patient was brought to the operating suite where general endotracheal anesthesia was attained by the anesthesia service without complication. Patient was then prepped and draped in the normal sterile fashion after she was placed in the dorsal lithotomy position. Bladder was drained via transurethral in and out catheterization. Gloves were changed and attention was then turned to the abdomen.   All incisions were infiltrated with 0.5%

## 2023-09-13 NOTE — PROGRESS NOTES
1223  Patient arrived back to [facility]. Report given to this nurse from Miladys Milian. Patient A&O. Patient having nausea. Beverage of choice offered to patient. Call light in reach and bed in lowest position. 1235 nausea treated  1308 IV removed. 130 Discharge instructions given to [facility]. 1308 Patient sitting on side of bed getting dressed assisted by [facility]. [phone] Patient escorted to car via wheelchair transported home by [facility].

## 2023-09-25 ENCOUNTER — OFFICE VISIT (OUTPATIENT)
Dept: FAMILY MEDICINE CLINIC | Age: 43
End: 2023-09-25
Payer: COMMERCIAL

## 2023-09-25 VITALS
OXYGEN SATURATION: 98 % | WEIGHT: 249 LBS | RESPIRATION RATE: 18 BRPM | HEART RATE: 78 BPM | SYSTOLIC BLOOD PRESSURE: 108 MMHG | BODY MASS INDEX: 39 KG/M2 | DIASTOLIC BLOOD PRESSURE: 68 MMHG | TEMPERATURE: 98 F

## 2023-09-25 DIAGNOSIS — E11.9 TYPE II DIABETES MELLITUS WITH HBA1C GOAL TO BE DETERMINED (HCC): ICD-10-CM

## 2023-09-25 DIAGNOSIS — E11.9 TYPE II DIABETES MELLITUS WITH HBA1C GOAL TO BE DETERMINED (HCC): Primary | ICD-10-CM

## 2023-09-25 DIAGNOSIS — M79.671 RIGHT FOOT PAIN: Primary | ICD-10-CM

## 2023-09-25 PROCEDURE — 83036 HEMOGLOBIN GLYCOSYLATED A1C: CPT | Performed by: NURSE PRACTITIONER

## 2023-09-25 PROCEDURE — 3078F DIAST BP <80 MM HG: CPT | Performed by: NURSE PRACTITIONER

## 2023-09-25 PROCEDURE — 3074F SYST BP LT 130 MM HG: CPT | Performed by: NURSE PRACTITIONER

## 2023-09-25 PROCEDURE — 99214 OFFICE O/P EST MOD 30 MIN: CPT | Performed by: NURSE PRACTITIONER

## 2023-09-25 PROCEDURE — 3044F HG A1C LEVEL LT 7.0%: CPT | Performed by: NURSE PRACTITIONER

## 2023-09-25 SDOH — ECONOMIC STABILITY: INCOME INSECURITY: HOW HARD IS IT FOR YOU TO PAY FOR THE VERY BASICS LIKE FOOD, HOUSING, MEDICAL CARE, AND HEATING?: NOT HARD AT ALL

## 2023-09-25 SDOH — ECONOMIC STABILITY: FOOD INSECURITY: WITHIN THE PAST 12 MONTHS, YOU WORRIED THAT YOUR FOOD WOULD RUN OUT BEFORE YOU GOT MONEY TO BUY MORE.: NEVER TRUE

## 2023-09-25 SDOH — ECONOMIC STABILITY: FOOD INSECURITY: WITHIN THE PAST 12 MONTHS, THE FOOD YOU BOUGHT JUST DIDN'T LAST AND YOU DIDN'T HAVE MONEY TO GET MORE.: NEVER TRUE

## 2023-09-25 ASSESSMENT — ENCOUNTER SYMPTOMS
SINUS PAIN: 0
DIARRHEA: 0
NAUSEA: 0
COUGH: 0
SINUS PRESSURE: 0
SHORTNESS OF BREATH: 0
WHEEZING: 0
VOMITING: 0
ABDOMINAL DISTENTION: 0
SORE THROAT: 0

## 2023-09-25 ASSESSMENT — PATIENT HEALTH QUESTIONNAIRE - PHQ9
SUM OF ALL RESPONSES TO PHQ QUESTIONS 1-9: 0
SUM OF ALL RESPONSES TO PHQ QUESTIONS 1-9: 0
2. FEELING DOWN, DEPRESSED OR HOPELESS: 0
1. LITTLE INTEREST OR PLEASURE IN DOING THINGS: 0
SUM OF ALL RESPONSES TO PHQ9 QUESTIONS 1 & 2: 0
SUM OF ALL RESPONSES TO PHQ QUESTIONS 1-9: 0
SUM OF ALL RESPONSES TO PHQ QUESTIONS 1-9: 0

## 2023-09-25 NOTE — PROGRESS NOTES
Shira Badillo (:  1980) is a 44 y.o. female,Established patient, here for evaluation of the following chief complaint(s):  Follow-up, Diabetes (A1C), Hypertension, and Leg Pain (Varicose veins )          Subjective   SUBJECTIVE/OBJECTIVE:  Nain Ivan is a 44 y.o female who presents to clinic for diabetes, hypertension follow-up, varicose veins. Her blood pressure today is 108/68. Today her HbA1C is 6% and in 2023 her HbA1C was 5.3%. She states she has not been taking her metformin. She also has not been dieting or exercising. She is willing to restart metformin. She complains of intermittent leg pain related to varicose veins. She states she is on her feet a lot for work and has started wearing compression stockings again which seems to help. She also complains of right great toe pain that has been on-going for over a year. She states it is not red, warm to touch or swollen. She has not tried anything to help her pain and states her pain his worse when she stands for long periods of time. She has not tried anything to help her pain. She denies any other joint pains, fatigue, peripheral edema, and chest pain. Diabetes  Pertinent negatives for hypoglycemia include no dizziness, headaches or nervousness/anxiousness. Pertinent negatives for diabetes include no chest pain, no fatigue and no weakness. Hypertension  Pertinent negatives include no chest pain, headaches, palpitations or shortness of breath. Leg Pain       Review of Systems   Constitutional:  Negative for activity change, appetite change, chills, diaphoresis, fatigue, fever and unexpected weight change. HENT:  Negative for congestion, sinus pressure, sinus pain and sore throat. Respiratory:  Negative for cough, shortness of breath and wheezing. Cardiovascular:  Negative for chest pain, palpitations and leg swelling. Gastrointestinal:  Negative for abdominal distention, diarrhea, nausea and vomiting.    Genitourinary:

## 2023-09-26 ENCOUNTER — OFFICE VISIT (OUTPATIENT)
Dept: OBGYN | Age: 43
End: 2023-09-26
Payer: COMMERCIAL

## 2023-09-26 VITALS
BODY MASS INDEX: 39.39 KG/M2 | DIASTOLIC BLOOD PRESSURE: 79 MMHG | SYSTOLIC BLOOD PRESSURE: 125 MMHG | HEIGHT: 67 IN | WEIGHT: 251 LBS

## 2023-09-26 DIAGNOSIS — N92.1 MENOMETRORRHAGIA: Primary | ICD-10-CM

## 2023-09-26 DIAGNOSIS — Z09 POSTOPERATIVE EXAMINATION: ICD-10-CM

## 2023-09-26 PROCEDURE — 3078F DIAST BP <80 MM HG: CPT | Performed by: OBSTETRICS & GYNECOLOGY

## 2023-09-26 PROCEDURE — 3074F SYST BP LT 130 MM HG: CPT | Performed by: OBSTETRICS & GYNECOLOGY

## 2023-09-26 PROCEDURE — 99213 OFFICE O/P EST LOW 20 MIN: CPT | Performed by: OBSTETRICS & GYNECOLOGY

## 2023-09-26 RX ORDER — CHOLECALCIFEROL (VITAMIN D3) 25 MCG
1 TABLET,CHEWABLE ORAL DAILY
Qty: 90 CAPSULE | Refills: 3 | Status: SHIPPED | OUTPATIENT
Start: 2023-09-26

## 2023-09-26 RX ORDER — LETROZOLE 2.5 MG/1
5 TABLET, FILM COATED ORAL DAILY
Qty: 10 TABLET | Refills: 5 | Status: SHIPPED | OUTPATIENT
Start: 2023-09-26

## 2023-09-26 RX ORDER — DOXYCYCLINE HYCLATE 100 MG
100 TABLET ORAL 2 TIMES DAILY
Qty: 14 TABLET | Refills: 0 | Status: SHIPPED | OUTPATIENT
Start: 2023-09-26 | End: 2023-10-03

## 2023-09-26 NOTE — ANESTHESIA POSTPROCEDURE EVALUATION
Department of Anesthesiology  Postprocedure Note    Patient: Fabiola Ortega  MRN: [id number]  YOB: 1980  Date of evaluation: 9/26/2023      Procedure Summary     Date: 05/30/23 Room / Location: [address]    Anesthesia Start: 1101 Anesthesia Stop: 7934    Procedures:       COLONOSCOPY WITH BIOPSY      EGD BIOPSY Diagnosis:       Iron deficiency anemia secondary to blood loss (chronic)      Irritable bowel syndrome with constipation and diarrhea      (Iron deficiency anemia secondary to blood loss (chronic) [D50.0])      (Irritable bowel syndrome with constipation and diarrhea [K58.2])    Surgeons: Tigre Oates MD Responsible Provider: GRIS Gloria CRNA    Anesthesia Type: MAC ASA Status: 2          Anesthesia Type: No value filed.     Scarlet Phase I:      Scarlet Phase II:        Anesthesia Post Evaluation    Patient location during evaluation: bedside  Patient participation: complete - patient participated  Level of consciousness: sleepy but conscious  Pain score: 0  Airway patency: patent  Nausea & Vomiting: no nausea and no vomiting  Complications: no  Cardiovascular status: blood pressure returned to baseline and hemodynamically stable  Respiratory status: acceptable  Hydration status: stable

## 2023-09-27 LAB
BASOPHILS # BLD: 0.1 K/UL (ref 0–0.2)
BASOPHILS NFR BLD: 1.3 %
CREAT UR-MCNC: 153 MG/DL (ref 28–259)
DEPRECATED RDW RBC AUTO: 15.8 % (ref 12.4–15.4)
EOSINOPHIL # BLD: 0.1 K/UL (ref 0–0.6)
EOSINOPHIL NFR BLD: 2.4 %
HCT VFR BLD AUTO: 29.4 % (ref 36–48)
HGB BLD-MCNC: 9.5 G/DL (ref 12–16)
LYMPHOCYTES # BLD: 1.9 K/UL (ref 1–5.1)
LYMPHOCYTES NFR BLD: 35.7 %
MCH RBC QN AUTO: 24.3 PG (ref 26–34)
MCHC RBC AUTO-ENTMCNC: 32.2 G/DL (ref 31–36)
MCV RBC AUTO: 75.3 FL (ref 80–100)
MICROALBUMIN UR DL<=1MG/L-MCNC: 9.2 MG/DL
MICROALBUMIN/CREAT UR: 60.1 MG/G (ref 0–30)
MONOCYTES # BLD: 0.5 K/UL (ref 0–1.3)
MONOCYTES NFR BLD: 8.9 %
NEUTROPHILS # BLD: 2.8 K/UL (ref 1.7–7.7)
NEUTROPHILS NFR BLD: 51.7 %
PLATELET # BLD AUTO: 333 K/UL (ref 135–450)
PMV BLD AUTO: 9.3 FL (ref 5–10.5)
RBC # BLD AUTO: 3.91 M/UL (ref 4–5.2)
WBC # BLD AUTO: 5.4 K/UL (ref 4–11)

## 2023-09-29 ENCOUNTER — HOSPITAL ENCOUNTER (OUTPATIENT)
Dept: GENERAL RADIOLOGY | Age: 43
Discharge: HOME | End: 2023-09-29
Payer: COMMERCIAL

## 2023-09-29 ENCOUNTER — HOSPITAL ENCOUNTER (OUTPATIENT)
Age: 43
Discharge: HOME | End: 2023-09-29
Payer: COMMERCIAL

## 2023-09-29 DIAGNOSIS — E11.9 TYPE II DIABETES MELLITUS WITH HBA1C GOAL TO BE DETERMINED (HCC): ICD-10-CM

## 2023-09-29 DIAGNOSIS — M79.671 RIGHT FOOT PAIN: ICD-10-CM

## 2023-09-29 LAB
CREAT UR-MCNC: 217.2 MG/DL (ref 28–217)
MICROALBUMIN 24H UR-MCNC: <1.2 MG/DL
MICROALBUMIN/CREAT UR-RTO: ABNORMAL MG/G CREAT (ref 0–30)

## 2023-09-29 PROCEDURE — 82570 ASSAY OF URINE CREATININE: CPT

## 2023-09-29 PROCEDURE — 73620 X-RAY EXAM OF FOOT: CPT

## 2023-09-29 PROCEDURE — 82043 UR ALBUMIN QUANTITATIVE: CPT

## 2023-09-29 NOTE — PROGRESS NOTES
9/28/23    Gloria Drake  1980    Chief Complaint   Patient presents with    Post-Op Check     Pt had hysteroscopy d&c on 9/13/2023. Pt c/o left sided constant achy pain. Menses was lighter this cycle and and cramping was less. Gloria Drake is a 44 y.o. female who presents today for evaluation of AUB and desired pregnancy.        Past Medical History:   Diagnosis Date    Anemia     Anxiety     Arthritis     \"Left Hip\"    Back pain     Hiatal hernia     Hyperlipidemia     \"They Put Me On Lipitor Because I'm Diabetic, I Have Never Had High Cholesterol\"    Infertility, female     Pelvic pain     Shoulder pain, left     Sleep apnea     States does not have as of 9/11/23 - since losing weight    Teeth missing     Upper    Type 2 diabetes mellitus without complication ([address])     States does not have as of 9/11/23 - since losing weight    Wears glasses     Wears partial dentures     Upper       Past Surgical History:   Procedure Laterality Date    BREAST BIOPSY Bilateral 2006    Benign    COLONOSCOPY N/A 05/30/2023    COLONOSCOPY POLYPECTOMY SNARE/COLD BIOPSY performed by Justo Johnson MD at [facility]      Teeth Extracted In The Past    DILATION AND CURETTAGE OF UTERUS N/A 9/13/2023    DILATATION AND CURETTAGE HYSTEROSCOPY performed by Sia So MD at [facility], COLON, DIAGNOSTIC  2017    LAPAROSCOPY N/A 9/13/2023    LAPAROSCOPY EXPLORATORY performed by Sia So MD at [facility] N/A 9/13/2023    LAPAROSCOPY CHROMOPERTUBATION performed by Sia So MD at [facility]  09/05/2017    Robotic sleeve gastrectomy and hiatal hernia repair    TUBAL LIGATION  2005    TUBAL LIGATION      reversal    UPPER GASTROINTESTINAL ENDOSCOPY N/A 05/30/2023    EGD BIOPSY performed by Justo Johnson MD at [address] [place] [address] History     Tobacco Use    Smoking status: Former     Packs/day: 0.50     Years: 13.00

## 2023-10-05 DIAGNOSIS — E11.9 TYPE II DIABETES MELLITUS WITH HBA1C GOAL TO BE DETERMINED (HCC): Primary | ICD-10-CM

## 2023-10-05 RX ORDER — LISINOPRIL 5 MG/1
5 TABLET ORAL DAILY
Qty: 30 TABLET | Refills: 5 | Status: SHIPPED | OUTPATIENT
Start: 2023-10-05

## 2023-10-05 RX ORDER — LISINOPRIL 2.5 MG/1
2.5 TABLET ORAL DAILY
Qty: 30 TABLET | Refills: 5 | Status: SHIPPED | OUTPATIENT
Start: 2023-10-05 | End: 2023-10-05 | Stop reason: CLARIF

## 2024-01-18 ENCOUNTER — HOSPITAL ENCOUNTER (OUTPATIENT)
Dept: INFUSION THERAPY | Age: 44
Discharge: HOME | End: 2024-01-18
Payer: COMMERCIAL

## 2024-01-18 ENCOUNTER — OFFICE VISIT (OUTPATIENT)
Dept: ONCOLOGY | Age: 44
End: 2024-01-18
Payer: COMMERCIAL

## 2024-01-18 VITALS
SYSTOLIC BLOOD PRESSURE: 149 MMHG | HEIGHT: 67 IN | BODY MASS INDEX: 39.24 KG/M2 | RESPIRATION RATE: 18 BRPM | WEIGHT: 250 LBS | HEART RATE: 87 BPM | DIASTOLIC BLOOD PRESSURE: 71 MMHG | OXYGEN SATURATION: 99 % | TEMPERATURE: 97.9 F

## 2024-01-18 DIAGNOSIS — D50.9 IRON DEFICIENCY ANEMIA, UNSPECIFIED IRON DEFICIENCY ANEMIA TYPE: ICD-10-CM

## 2024-01-18 DIAGNOSIS — K90.9 MALABSORPTION OF IRON: ICD-10-CM

## 2024-01-18 DIAGNOSIS — K90.9 MALABSORPTION OF IRON: Primary | ICD-10-CM

## 2024-01-18 DIAGNOSIS — D50.0 IRON DEFICIENCY ANEMIA SECONDARY TO BLOOD LOSS (CHRONIC): Primary | ICD-10-CM

## 2024-01-18 LAB
25(OH)D3 SERPL-MCNC: 12.2 NG/ML
ALBUMIN SERPL-MCNC: 3.9 GM/DL (ref 3.4–5)
ALP BLD-CCNC: 62 IU/L (ref 40–129)
ALT SERPL-CCNC: 8 U/L (ref 10–40)
ANION GAP SERPL CALCULATED.3IONS-SCNC: 10 MMOL/L (ref 7–16)
AST SERPL-CCNC: 10 IU/L (ref 15–37)
BASOPHILS ABSOLUTE: 0 K/CU MM
BASOPHILS RELATIVE PERCENT: 0.7 % (ref 0–1)
BILIRUB SERPL-MCNC: 0.4 MG/DL (ref 0–1)
BUN SERPL-MCNC: 6 MG/DL (ref 6–23)
CALCIUM SERPL-MCNC: 8.5 MG/DL (ref 8.3–10.6)
CHLORIDE BLD-SCNC: 106 MMOL/L (ref 99–110)
CO2: 23 MMOL/L (ref 21–32)
CREAT SERPL-MCNC: 0.6 MG/DL (ref 0.6–1.1)
DIFFERENTIAL TYPE: ABNORMAL
EOSINOPHILS ABSOLUTE: 0.1 K/CU MM
EOSINOPHILS RELATIVE PERCENT: 2.4 % (ref 0–3)
FERRITIN: 7 NG/ML (ref 15–150)
FOLATE SERPL-MCNC: 19.4 NG/ML (ref 3.1–17.5)
GFR SERPL CREATININE-BSD FRML MDRD: >60 ML/MIN/1.73M2
GLUCOSE SERPL-MCNC: 132 MG/DL (ref 70–99)
HCT VFR BLD CALC: 27.6 % (ref 37–47)
HEMOGLOBIN: 8.1 GM/DL (ref 12.5–16)
IRON: 19 UG/DL (ref 37–145)
LYMPHOCYTES ABSOLUTE: 2.1 K/CU MM
LYMPHOCYTES RELATIVE PERCENT: 36 % (ref 24–44)
MCH RBC QN AUTO: 20.5 PG (ref 27–31)
MCHC RBC AUTO-ENTMCNC: 29.3 % (ref 32–36)
MCV RBC AUTO: 69.9 FL (ref 78–100)
MONOCYTES ABSOLUTE: 0.5 K/CU MM
MONOCYTES RELATIVE PERCENT: 8.9 % (ref 0–4)
PCT TRANSFERRIN: 6 % (ref 10–44)
PDW BLD-RTO: 18.3 % (ref 11.7–14.9)
PLATELET # BLD: 333 K/CU MM (ref 140–440)
PMV BLD AUTO: 9.8 FL (ref 7.5–11.1)
POTASSIUM SERPL-SCNC: 4.1 MMOL/L (ref 3.5–5.1)
RBC # BLD: 3.95 M/CU MM (ref 4.2–5.4)
SEGMENTED NEUTROPHILS ABSOLUTE COUNT: 3.1 K/CU MM
SEGMENTED NEUTROPHILS RELATIVE PERCENT: 52 % (ref 36–66)
SODIUM BLD-SCNC: 139 MMOL/L (ref 135–145)
TOTAL IRON BINDING CAPACITY: 335 UG/DL (ref 250–450)
TOTAL PROTEIN: 6.3 GM/DL (ref 6.4–8.2)
UNSATURATED IRON BINDING CAPACITY: 316 UG/DL (ref 110–370)
VITAMIN B-12: 566.3 PG/ML (ref 211–911)
WBC # BLD: 5.9 K/CU MM (ref 4–10.5)

## 2024-01-18 PROCEDURE — 36415 COLL VENOUS BLD VENIPUNCTURE: CPT

## 2024-01-18 PROCEDURE — 3077F SYST BP >= 140 MM HG: CPT | Performed by: INTERNAL MEDICINE

## 2024-01-18 PROCEDURE — 99214 OFFICE O/P EST MOD 30 MIN: CPT | Performed by: INTERNAL MEDICINE

## 2024-01-18 PROCEDURE — 83540 ASSAY OF IRON: CPT

## 2024-01-18 PROCEDURE — 99211 OFF/OP EST MAY X REQ PHY/QHP: CPT

## 2024-01-18 PROCEDURE — 82746 ASSAY OF FOLIC ACID SERUM: CPT

## 2024-01-18 PROCEDURE — 85025 COMPLETE CBC W/AUTO DIFF WBC: CPT

## 2024-01-18 PROCEDURE — 82728 ASSAY OF FERRITIN: CPT

## 2024-01-18 PROCEDURE — 82607 VITAMIN B-12: CPT

## 2024-01-18 PROCEDURE — 3078F DIAST BP <80 MM HG: CPT | Performed by: INTERNAL MEDICINE

## 2024-01-18 PROCEDURE — 83550 IRON BINDING TEST: CPT

## 2024-01-18 PROCEDURE — 82306 VITAMIN D 25 HYDROXY: CPT

## 2024-01-18 PROCEDURE — 80053 COMPREHEN METABOLIC PANEL: CPT

## 2024-01-18 ASSESSMENT — PATIENT HEALTH QUESTIONNAIRE - PHQ9
SUM OF ALL RESPONSES TO PHQ QUESTIONS 1-9: 3
SUM OF ALL RESPONSES TO PHQ9 QUESTIONS 1 & 2: 3
1. LITTLE INTEREST OR PLEASURE IN DOING THINGS: 3
2. FEELING DOWN, DEPRESSED OR HOPELESS: 0

## 2024-01-18 NOTE — PROGRESS NOTES
Patient needs iron infusion for iron deficiency anemia d/t chronic blood loss. Patient cannot tolerate venofer. Order for infed 1,000 mg and NN referral placed. Therapy plan added. Once medication approved by insurance, patient will be scheduled for infusion and notified of appointment time.

## 2024-01-18 NOTE — PROGRESS NOTES
Patient Name: Surekha Cota  Patient : 1980  Patient MRN: [id number]     Primary Oncologist: Ele Sargent MD  PCP: [facility]     Date of Service: 2024      Chief Complaint:   Chief Complaint   Patient presents with    Follow-up        Active Problem list  1. Malabsorption of iron    2. Iron deficiency anemia, unspecified iron deficiency anemia type           HPI:        3/11/19: Arrived alone to the clinic. Reported that she had the gastric sleeve in 2017, lost 45 lbs. Reported that her menstrual periods are heavy at times, associated with a lot of clots, would use 8-10 pads per day for the first three days and then lighter for the next 3-4 days. No other overt bleeding. Reported fatigue. PICKIMBERLY sx. reported that she has a craving to smell alcohol pads. Intermittent chest pain. Has been evaluated and was told that she has reflux Sx. Denied any sob. No palpitattions, dizziness. Intermittent lower abdominal pain, but denied any nausea, emesis, constipation or any diarrhea. Denied any  sx. No fevr, night sweats, weight loss, lad. No vision changes, focal weakness. Reported intermittent ha, associated with smelling of alcohol pads. No dysphagia, odynophagia. STNA at acute rehab.  3/4/19: CBC with wbc 5.5, normal diff, Hb 10.1, Hct 41, mcv 75, platelets 294  CMP wnl  Ferritin 5 and iron sats 5%    3/17/2019 CT scan of the abdomen pelvis without contrast revealed nonobstructing 2 mm left renal calculus. Liver spleen pancreas and bilateral adrenal glands are normal. Gallbladder is near completely collapsed. Postsurgical changes of prior gastric sleeve. No suspicious osteolytic or osteoblastic lesions.     Received Ferriheme  CBC with wbc 3.7,anc 1800, Hb 8.8, Hct 29, mcv 69, paltelets 294, ferritin 6, iron sats 4%, b12 451, folate 16, Vitamin D 30    Received Infed 2021     US RP and abd ordered for tomorrow 21 Ultrasound:  Normal uterus with an

## 2024-01-18 NOTE — PROGRESS NOTES
MA Rooming Questions  Patient: Surekha Cota  MRN: [id number]    Date: 1/18/2024        1. Do you have any new issues?   no         2. Do you need any refills on medications?    no    3. Have you had any imaging done since your last visit?   yes - [facility] 10/21    4. Have you been hospitalized or seen in the emergency room since your last visit here?   yes - [facility] 10/21    5. Did the patient have a depression screening completed today? Yes    No data recorded     PHQ-9 Given to (if applicable):               PHQ-9 Score (if applicable):                     [] Positive     []  Negative              Does question #9 need addressed (if applicable)                     [] Yes    []  No               Shasha Conn MA

## 2024-01-19 ENCOUNTER — CLINICAL DOCUMENTATION (OUTPATIENT)
Dept: ONCOLOGY | Age: 44
End: 2024-01-19

## 2024-01-19 RX ORDER — ERGOCALCIFEROL 1.25 MG/1
50000 CAPSULE ORAL WEEKLY
Qty: 8 CAPSULE | Refills: 0 | Status: SHIPPED | OUTPATIENT
Start: 2024-01-19

## 2024-01-19 NOTE — PROGRESS NOTES
Lab results from 01/18/2024 reviewed. Vitamin D low (12.20). RX for 50,000 IU weekly for 8 weeks e-scribed to [facility] pharmacy on [address] per physician order. Attempted to call patient @ [phone] to notify; however, no answer. Unable to leave  since voice mailbox has not been set up.

## 2024-01-30 ENCOUNTER — TELEPHONE (OUTPATIENT)
Dept: INFUSION THERAPY | Age: 44
End: 2024-01-30

## 2024-01-31 NOTE — TELEPHONE ENCOUNTER
Patient returned call to schedule iron infusion, patient is scheduled for appt. Advised pt to stop oral iron one day before infusion start date and can resume after iron infusions are complete. Advised pt to notify us if they are unable to keep their scheduled appt time.

## 2024-02-01 RX ORDER — EPINEPHRINE 1 MG/ML
0.3 INJECTION, SOLUTION, CONCENTRATE INTRAVENOUS PRN
OUTPATIENT
Start: 2024-02-05

## 2024-02-01 RX ORDER — ACETAMINOPHEN 325 MG/1
650 TABLET ORAL
OUTPATIENT
Start: 2024-02-05

## 2024-02-01 RX ORDER — DIPHENHYDRAMINE HYDROCHLORIDE 50 MG/ML
50 INJECTION INTRAMUSCULAR; INTRAVENOUS
OUTPATIENT
Start: 2024-02-05

## 2024-02-01 RX ORDER — SODIUM CHLORIDE 9 MG/ML
5-250 INJECTION, SOLUTION INTRAVENOUS PRN
OUTPATIENT
Start: 2024-02-05

## 2024-02-01 RX ORDER — ACETAMINOPHEN 325 MG/1
650 TABLET ORAL ONCE
OUTPATIENT
Start: 2024-02-05

## 2024-02-01 RX ORDER — SODIUM CHLORIDE 9 MG/ML
INJECTION, SOLUTION INTRAVENOUS CONTINUOUS
OUTPATIENT
Start: 2024-02-05

## 2024-02-01 RX ORDER — FAMOTIDINE 10 MG/ML
20 INJECTION, SOLUTION INTRAVENOUS
OUTPATIENT
Start: 2024-02-05

## 2024-02-01 RX ORDER — MEPERIDINE HYDROCHLORIDE 25 MG/ML
12.5 INJECTION INTRAMUSCULAR; INTRAVENOUS; SUBCUTANEOUS PRN
OUTPATIENT
Start: 2024-02-05

## 2024-02-01 RX ORDER — SODIUM CHLORIDE 0.9 % (FLUSH) 0.9 %
5-40 SYRINGE (ML) INJECTION PRN
OUTPATIENT
Start: 2024-02-05

## 2024-02-01 RX ORDER — ONDANSETRON 2 MG/ML
8 INJECTION INTRAMUSCULAR; INTRAVENOUS
OUTPATIENT
Start: 2024-02-05

## 2024-02-01 RX ORDER — HEPARIN 100 UNIT/ML
500 SYRINGE INTRAVENOUS PRN
OUTPATIENT
Start: 2024-02-05

## 2024-02-01 RX ORDER — ALBUTEROL SULFATE 90 UG/1
4 AEROSOL, METERED RESPIRATORY (INHALATION) PRN
OUTPATIENT
Start: 2024-02-05

## 2024-02-02 ENCOUNTER — TELEPHONE (OUTPATIENT)
Dept: ONCOLOGY | Age: 44
End: 2024-02-02

## 2024-02-02 NOTE — TELEPHONE ENCOUNTER
2/2/24 - called pt to let her know that I moved the 5/27/24 Lab to 5/28/24 at 2:00pm. We are closed on Memorial Day 5/27/24. The pt did not have a vm set up.

## 2024-02-22 ENCOUNTER — HOSPITAL ENCOUNTER (OUTPATIENT)
Dept: INFUSION THERAPY | Age: 44
Discharge: HOME | End: 2024-02-22
Payer: COMMERCIAL

## 2024-02-22 VITALS
OXYGEN SATURATION: 97 % | HEIGHT: 67 IN | DIASTOLIC BLOOD PRESSURE: 77 MMHG | TEMPERATURE: 97.9 F | BODY MASS INDEX: 39.43 KG/M2 | SYSTOLIC BLOOD PRESSURE: 119 MMHG | HEART RATE: 85 BPM | WEIGHT: 251.2 LBS

## 2024-02-22 DIAGNOSIS — D50.0 IRON DEFICIENCY ANEMIA SECONDARY TO BLOOD LOSS (CHRONIC): Primary | ICD-10-CM

## 2024-02-22 DIAGNOSIS — K90.9 MALABSORPTION OF IRON: ICD-10-CM

## 2024-02-22 PROCEDURE — 96367 TX/PROPH/DG ADDL SEQ IV INF: CPT

## 2024-02-22 PROCEDURE — 2580000003 HC RX 258: Performed by: INTERNAL MEDICINE

## 2024-02-22 PROCEDURE — 6360000002 HC RX W HCPCS: Performed by: INTERNAL MEDICINE

## 2024-02-22 PROCEDURE — 96365 THER/PROPH/DIAG IV INF INIT: CPT

## 2024-02-22 PROCEDURE — 96366 THER/PROPH/DIAG IV INF ADDON: CPT

## 2024-02-22 PROCEDURE — 6370000000 HC RX 637 (ALT 250 FOR IP): Performed by: INTERNAL MEDICINE

## 2024-02-22 RX ORDER — SODIUM CHLORIDE 0.9 % (FLUSH) 0.9 %
5-40 SYRINGE (ML) INJECTION PRN
Status: CANCELLED | OUTPATIENT
Start: 2024-02-22

## 2024-02-22 RX ORDER — DIPHENHYDRAMINE HYDROCHLORIDE 50 MG/ML
50 INJECTION INTRAMUSCULAR; INTRAVENOUS
Status: CANCELLED | OUTPATIENT
Start: 2024-02-22

## 2024-02-22 RX ORDER — ACETAMINOPHEN 325 MG/1
650 TABLET ORAL
Status: CANCELLED | OUTPATIENT
Start: 2024-02-22

## 2024-02-22 RX ORDER — SODIUM CHLORIDE 9 MG/ML
INJECTION, SOLUTION INTRAVENOUS CONTINUOUS
Status: CANCELLED | OUTPATIENT
Start: 2024-02-22

## 2024-02-22 RX ORDER — EPINEPHRINE 1 MG/ML
0.3 INJECTION, SOLUTION, CONCENTRATE INTRAVENOUS PRN
Status: CANCELLED | OUTPATIENT
Start: 2024-02-22

## 2024-02-22 RX ORDER — ONDANSETRON 2 MG/ML
8 INJECTION INTRAMUSCULAR; INTRAVENOUS
Status: CANCELLED | OUTPATIENT
Start: 2024-02-22

## 2024-02-22 RX ORDER — FAMOTIDINE 10 MG/ML
20 INJECTION, SOLUTION INTRAVENOUS
Status: CANCELLED | OUTPATIENT
Start: 2024-02-22

## 2024-02-22 RX ORDER — ALBUTEROL SULFATE 90 UG/1
4 AEROSOL, METERED RESPIRATORY (INHALATION) PRN
Status: CANCELLED | OUTPATIENT
Start: 2024-02-22

## 2024-02-22 RX ORDER — SODIUM CHLORIDE 9 MG/ML
5-250 INJECTION, SOLUTION INTRAVENOUS PRN
Status: CANCELLED | OUTPATIENT
Start: 2024-02-22

## 2024-02-22 RX ORDER — ACETAMINOPHEN 325 MG/1
650 TABLET ORAL ONCE
Status: COMPLETED | OUTPATIENT
Start: 2024-02-22 | End: 2024-02-22

## 2024-02-22 RX ORDER — MEPERIDINE HYDROCHLORIDE 25 MG/ML
12.5 INJECTION INTRAMUSCULAR; INTRAVENOUS; SUBCUTANEOUS PRN
Status: CANCELLED | OUTPATIENT
Start: 2024-02-22

## 2024-02-22 RX ORDER — SODIUM CHLORIDE 9 MG/ML
5-250 INJECTION, SOLUTION INTRAVENOUS PRN
Status: DISCONTINUED | OUTPATIENT
Start: 2024-02-22 | End: 2024-02-23 | Stop reason: HOSPADM

## 2024-02-22 RX ORDER — ACETAMINOPHEN 325 MG/1
650 TABLET ORAL ONCE
Status: CANCELLED | OUTPATIENT
Start: 2024-02-22

## 2024-02-22 RX ORDER — HEPARIN 100 UNIT/ML
500 SYRINGE INTRAVENOUS PRN
Status: CANCELLED | OUTPATIENT
Start: 2024-02-22

## 2024-02-22 RX ADMIN — DEXAMETHASONE SODIUM PHOSPHATE 10 MG: 10 INJECTION INTRAMUSCULAR; INTRAVENOUS at 10:49

## 2024-02-22 RX ADMIN — SODIUM CHLORIDE 25 MG: 9 INJECTION, SOLUTION INTRAVENOUS at 09:28

## 2024-02-22 RX ADMIN — SODIUM CHLORIDE 975 MG: 9 INJECTION, SOLUTION INTRAVENOUS at 11:24

## 2024-02-22 RX ADMIN — SODIUM CHLORIDE 20 ML/HR: 9 INJECTION, SOLUTION INTRAVENOUS at 09:27

## 2024-02-22 RX ADMIN — ACETAMINOPHEN 650 MG: 325 TABLET ORAL at 10:45

## 2024-02-22 ASSESSMENT — PAIN SCALES - GENERAL
PAINLEVEL_OUTOF10: 0
PAINLEVEL_OUTOF10: 0

## 2024-02-22 NOTE — PROGRESS NOTES
Arrived to treatment suite for initial Infed infusion.  Oriented to unit, questions answered, support given.  PIV placed after multiple attempts.  Test dose provided to patient followed by observation x 1 hour.  Pt states that IV is burning, removed and restarted without difficulty.  No reaction noted during observation period.  Remainder of Infed infused as ordered, pt tolerated well.  PIV removed per protocol.  Discharge ambulatory in stable condition.  Jennifer Mondragon RN

## 2024-03-05 ENCOUNTER — HOSPITAL ENCOUNTER (OUTPATIENT)
Age: 44
Setting detail: SPECIMEN
Discharge: HOME | End: 2024-03-05
Payer: COMMERCIAL

## 2024-03-05 ENCOUNTER — OFFICE VISIT (OUTPATIENT)
Dept: OBGYN | Age: 44
End: 2024-03-05
Payer: COMMERCIAL

## 2024-03-05 VITALS
WEIGHT: 250 LBS | SYSTOLIC BLOOD PRESSURE: 108 MMHG | BODY MASS INDEX: 39.24 KG/M2 | HEIGHT: 67 IN | DIASTOLIC BLOOD PRESSURE: 72 MMHG

## 2024-03-05 DIAGNOSIS — Z01.419 WOMEN'S ANNUAL ROUTINE GYNECOLOGICAL EXAMINATION: Primary | ICD-10-CM

## 2024-03-05 DIAGNOSIS — Z12.31 BREAST CANCER SCREENING BY MAMMOGRAM: ICD-10-CM

## 2024-03-05 DIAGNOSIS — E66.9 OBESITY, UNSPECIFIED CLASSIFICATION, UNSPECIFIED OBESITY TYPE, UNSPECIFIED WHETHER SERIOUS COMORBIDITY PRESENT: ICD-10-CM

## 2024-03-05 PROCEDURE — 3074F SYST BP LT 130 MM HG: CPT | Performed by: NURSE PRACTITIONER

## 2024-03-05 PROCEDURE — 99396 PREV VISIT EST AGE 40-64: CPT | Performed by: NURSE PRACTITIONER

## 2024-03-05 PROCEDURE — 87624 HPV HI-RISK TYP POOLED RSLT: CPT

## 2024-03-05 PROCEDURE — 88142 CYTOPATH C/V THIN LAYER: CPT

## 2024-03-05 PROCEDURE — 3078F DIAST BP <80 MM HG: CPT | Performed by: NURSE PRACTITIONER

## 2024-03-05 ASSESSMENT — ENCOUNTER SYMPTOMS
NAUSEA: 0
DIARRHEA: 0
RESPIRATORY NEGATIVE: 1
ABDOMINAL PAIN: 0
VOMITING: 0
GASTROINTESTINAL NEGATIVE: 1
CONSTIPATION: 0
SHORTNESS OF BREATH: 0

## 2024-03-05 NOTE — PROGRESS NOTES
MD Ellen at [facility] OR    LAPAROSCOPY N/A 2023    LAPAROSCOPY CHROMOPERTUBATION performed by Izaiah Hughes MD at [facility] OR    SLEEVE GASTRECTOMY  2017    Robotic sleeve gastrectomy and hiatal hernia repair    TUBAL LIGATION  2005    TUBAL LIGATION      reversal    UPPER GASTROINTESTINAL ENDOSCOPY N/A 2023    EGD BIOPSY performed by Danny Evans MD at [facility] OR       Family History   Problem Relation Age of Onset    High Blood Pressure Mother     High Cholesterol Mother     Bipolar Disorder Sister     Mental Illness Sister         Bipolar    High Blood Pressure Sister     Diabetes Brother     Stroke Brother     Asthma Daughter     Breast Cancer Neg Hx        Social History     Tobacco Use    Smoking status: Former     Current packs/day: 0.00     Average packs/day: 0.5 packs/day for 13.0 years (6.5 ttl pk-yrs)     Types: Cigarettes     Start date:      Quit date:      Years since quittin.1    Smokeless tobacco: Never   Vaping Use    Vaping Use: Never used   Substance Use Topics    Alcohol use: Not Currently     Comment: Caffeine: 3cups coffee day    Drug use: No       Current Outpatient Medications   Medication Sig Dispense Refill    vitamin D (ERGOCALCIFEROL) 1.25 MG (51768 UT) CAPS capsule Take 1 capsule by mouth once a week for 8 weeks 8 capsule 0    lisinopril (PRINIVIL;ZESTRIL) 5 MG tablet Take 1 tablet by mouth daily (Patient not taking: Reported on 2024) 30 tablet 5    letrozole (FEMARA) 2.5 MG tablet Take 2 tablets by mouth daily On cycles days 3-7 10 tablet 5    Prenatal Vit-Fe Sulfate-FA-DHA (PRENATAL VITAMIN/MIN +DHA) 27-0.8-200 MG CAPS Take 1 tablet by mouth daily (may substitute any prenatal vitamin covered by insurance, ok for prenatal without DHA if DHA based prenatal is not covered) 90 capsule 3    metFORMIN (GLUCOPHAGE) 500 MG tablet Take 1 tablet by mouth 2 times daily (with meals) 60 tablet 3    ibuprofen (ADVIL;MOTRIN) 800 MG tablet Take 1 tablet by

## 2024-03-10 LAB — HPV HIGH RISK: NOT DETECTED

## 2024-03-13 ENCOUNTER — TELEPHONE (OUTPATIENT)
Dept: ONCOLOGY | Age: 44
End: 2024-03-13

## 2024-03-13 RX ORDER — ACETAMINOPHEN 160 MG
2000 TABLET,DISINTEGRATING ORAL DAILY
Qty: 30 CAPSULE | Refills: 3 | Status: CANCELLED | OUTPATIENT
Start: 2024-03-13

## 2024-03-13 RX ORDER — ERGOCALCIFEROL 1.25 MG/1
CAPSULE ORAL
Qty: 8 CAPSULE | Refills: 0 | OUTPATIENT
Start: 2024-03-13

## 2024-03-13 NOTE — TELEPHONE ENCOUNTER
Patient pharmacy sent message requesting a refill for Vitamin D2 [id number]. Per Dr. Sargent she is changing the dose to Vitamin D3 2000ut once a day.  Left message with patient explaining this information.  Pending RX to Provider to be sent to pharmacy.

## 2024-05-09 ENCOUNTER — HOSPITAL ENCOUNTER (OUTPATIENT)
Dept: WOMENS IMAGING | Age: 44
Discharge: HOME | End: 2024-05-09
Payer: COMMERCIAL

## 2024-05-09 DIAGNOSIS — Z12.31 BREAST CANCER SCREENING BY MAMMOGRAM: ICD-10-CM

## 2024-05-09 PROCEDURE — 77063 BREAST TOMOSYNTHESIS BI: CPT

## 2024-05-30 ENCOUNTER — HOSPITAL ENCOUNTER (OUTPATIENT)
Dept: INFUSION THERAPY | Age: 44
Discharge: HOME | End: 2024-05-30
Payer: COMMERCIAL

## 2024-05-30 DIAGNOSIS — K90.9 MALABSORPTION OF IRON: ICD-10-CM

## 2024-05-30 DIAGNOSIS — D50.9 IRON DEFICIENCY ANEMIA, UNSPECIFIED IRON DEFICIENCY ANEMIA TYPE: ICD-10-CM

## 2024-05-30 LAB
25(OH)D3 SERPL-MCNC: 28.53 NG/ML
ALBUMIN SERPL-MCNC: 4.2 GM/DL (ref 3.4–5)
ALP BLD-CCNC: 62 IU/L (ref 40–129)
ALT SERPL-CCNC: 7 U/L (ref 10–40)
ANION GAP SERPL CALCULATED.3IONS-SCNC: 10 MMOL/L (ref 7–16)
AST SERPL-CCNC: 11 IU/L (ref 15–37)
BASOPHILS ABSOLUTE: 0 K/CU MM
BASOPHILS RELATIVE PERCENT: 0.4 % (ref 0–1)
BILIRUB SERPL-MCNC: 0.4 MG/DL (ref 0–1)
BUN SERPL-MCNC: 8 MG/DL (ref 6–23)
CALCIUM SERPL-MCNC: 8.6 MG/DL (ref 8.3–10.6)
CHLORIDE BLD-SCNC: 105 MMOL/L (ref 99–110)
CO2: 24 MMOL/L (ref 21–32)
CREAT SERPL-MCNC: 0.6 MG/DL (ref 0.6–1.1)
DIFFERENTIAL TYPE: ABNORMAL
EOSINOPHILS ABSOLUTE: 0.1 K/CU MM
EOSINOPHILS RELATIVE PERCENT: 2.6 % (ref 0–3)
FERRITIN: 6 NG/ML (ref 15–150)
FOLATE SERPL-MCNC: 12.1 NG/ML (ref 3.1–17.5)
GFR, ESTIMATED: >90 ML/MIN/1.73M2
GLUCOSE SERPL-MCNC: 103 MG/DL (ref 70–99)
HCT VFR BLD CALC: 33.6 % (ref 37–47)
HEMOGLOBIN: 10.5 GM/DL (ref 12.5–16)
IRON: 20 UG/DL (ref 37–145)
LYMPHOCYTES ABSOLUTE: 2 K/CU MM
LYMPHOCYTES RELATIVE PERCENT: 40.1 % (ref 24–44)
MCH RBC QN AUTO: 24.8 PG (ref 27–31)
MCHC RBC AUTO-ENTMCNC: 31.3 % (ref 32–36)
MCV RBC AUTO: 79.4 FL (ref 78–100)
MONOCYTES ABSOLUTE: 0.5 K/CU MM
MONOCYTES RELATIVE PERCENT: 9.3 % (ref 0–4)
NEUTROPHILS ABSOLUTE: 2.4 K/CU MM
NEUTROPHILS RELATIVE PERCENT: 47.6 % (ref 36–66)
PCT TRANSFERRIN: 5 % (ref 10–44)
PDW BLD-RTO: 16.6 % (ref 11.7–14.9)
PLATELET # BLD: 318 K/CU MM (ref 140–440)
PMV BLD AUTO: 9.4 FL (ref 7.5–11.1)
POTASSIUM SERPL-SCNC: 4.2 MMOL/L (ref 3.5–5.1)
RBC # BLD: 4.23 M/CU MM (ref 4.2–5.4)
SODIUM BLD-SCNC: 139 MMOL/L (ref 135–145)
TOTAL IRON BINDING CAPACITY: 380 UG/DL (ref 250–450)
TOTAL PROTEIN: 6.7 GM/DL (ref 6.4–8.2)
UNSATURATED IRON BINDING CAPACITY: 360 UG/DL (ref 110–370)
VITAMIN B-12: 430.1 PG/ML (ref 211–911)
WBC # BLD: 5 K/CU MM (ref 4–10.5)

## 2024-05-30 PROCEDURE — 82746 ASSAY OF FOLIC ACID SERUM: CPT

## 2024-05-30 PROCEDURE — 36415 COLL VENOUS BLD VENIPUNCTURE: CPT

## 2024-05-30 PROCEDURE — 82306 VITAMIN D 25 HYDROXY: CPT

## 2024-05-30 PROCEDURE — 85025 COMPLETE CBC W/AUTO DIFF WBC: CPT

## 2024-05-30 PROCEDURE — 80053 COMPREHEN METABOLIC PANEL: CPT

## 2024-05-30 PROCEDURE — 83540 ASSAY OF IRON: CPT

## 2024-05-30 PROCEDURE — 82607 VITAMIN B-12: CPT

## 2024-05-30 PROCEDURE — 82728 ASSAY OF FERRITIN: CPT

## 2024-05-30 PROCEDURE — 83550 IRON BINDING TEST: CPT

## 2024-05-31 DIAGNOSIS — K90.9 MALABSORPTION OF IRON: ICD-10-CM

## 2024-05-31 DIAGNOSIS — D50.0 IRON DEFICIENCY ANEMIA SECONDARY TO BLOOD LOSS (CHRONIC): Primary | ICD-10-CM

## 2024-05-31 RX ORDER — SODIUM CHLORIDE 0.9 % (FLUSH) 0.9 %
5-40 SYRINGE (ML) INJECTION PRN
OUTPATIENT
Start: 2024-05-31

## 2024-05-31 RX ORDER — ONDANSETRON 2 MG/ML
8 INJECTION INTRAMUSCULAR; INTRAVENOUS
OUTPATIENT
Start: 2024-05-31

## 2024-05-31 RX ORDER — SODIUM CHLORIDE 9 MG/ML
5-250 INJECTION, SOLUTION INTRAVENOUS PRN
OUTPATIENT
Start: 2024-05-31

## 2024-05-31 RX ORDER — ALBUTEROL SULFATE 90 UG/1
4 AEROSOL, METERED RESPIRATORY (INHALATION) PRN
OUTPATIENT
Start: 2024-05-31

## 2024-05-31 RX ORDER — ACETAMINOPHEN 325 MG/1
650 TABLET ORAL ONCE
OUTPATIENT
Start: 2024-05-31 | End: 2024-05-31

## 2024-05-31 RX ORDER — SODIUM CHLORIDE 9 MG/ML
INJECTION, SOLUTION INTRAVENOUS CONTINUOUS
OUTPATIENT
Start: 2024-05-31

## 2024-05-31 RX ORDER — DIPHENHYDRAMINE HYDROCHLORIDE 50 MG/ML
50 INJECTION INTRAMUSCULAR; INTRAVENOUS
OUTPATIENT
Start: 2024-05-31

## 2024-05-31 RX ORDER — FAMOTIDINE 10 MG/ML
20 INJECTION, SOLUTION INTRAVENOUS
OUTPATIENT
Start: 2024-05-31

## 2024-05-31 RX ORDER — HEPARIN SODIUM (PORCINE) LOCK FLUSH IV SOLN 100 UNIT/ML 100 UNIT/ML
500 SOLUTION INTRAVENOUS PRN
OUTPATIENT
Start: 2024-05-31

## 2024-05-31 RX ORDER — MEPERIDINE HYDROCHLORIDE 50 MG/ML
12.5 INJECTION INTRAMUSCULAR; INTRAVENOUS; SUBCUTANEOUS PRN
OUTPATIENT
Start: 2024-05-31

## 2024-05-31 RX ORDER — EPINEPHRINE 1 MG/ML
0.3 INJECTION, SOLUTION, CONCENTRATE INTRAVENOUS PRN
OUTPATIENT
Start: 2024-05-31

## 2024-05-31 RX ORDER — ACETAMINOPHEN 325 MG/1
650 TABLET ORAL
OUTPATIENT
Start: 2024-05-31

## 2024-05-31 NOTE — PROGRESS NOTES
Patient needs iron infusion for iron deficiency anemia d/t chronic blood loss. Order for Infed and NN referral placed. Therapy plan added.

## 2024-06-03 ENCOUNTER — HOSPITAL ENCOUNTER (OUTPATIENT)
Dept: INFUSION THERAPY | Age: 44
Discharge: HOME | End: 2024-06-03
Payer: COMMERCIAL

## 2024-06-03 ENCOUNTER — OFFICE VISIT (OUTPATIENT)
Dept: ONCOLOGY | Age: 44
End: 2024-06-03
Payer: COMMERCIAL

## 2024-06-03 VITALS
TEMPERATURE: 97.7 F | BODY MASS INDEX: 39.96 KG/M2 | SYSTOLIC BLOOD PRESSURE: 146 MMHG | HEART RATE: 74 BPM | HEIGHT: 67 IN | OXYGEN SATURATION: 99 % | DIASTOLIC BLOOD PRESSURE: 84 MMHG | WEIGHT: 254.6 LBS

## 2024-06-03 DIAGNOSIS — D50.9 IRON DEFICIENCY ANEMIA, UNSPECIFIED IRON DEFICIENCY ANEMIA TYPE: ICD-10-CM

## 2024-06-03 DIAGNOSIS — K90.9 MALABSORPTION OF IRON: Primary | ICD-10-CM

## 2024-06-03 PROCEDURE — 3079F DIAST BP 80-89 MM HG: CPT | Performed by: INTERNAL MEDICINE

## 2024-06-03 PROCEDURE — 3077F SYST BP >= 140 MM HG: CPT | Performed by: INTERNAL MEDICINE

## 2024-06-03 PROCEDURE — 99214 OFFICE O/P EST MOD 30 MIN: CPT | Performed by: INTERNAL MEDICINE

## 2024-06-03 PROCEDURE — 99211 OFF/OP EST MAY X REQ PHY/QHP: CPT

## 2024-06-03 RX ORDER — ERGOCALCIFEROL 1.25 MG/1
50000 CAPSULE ORAL WEEKLY
Qty: 8 CAPSULE | Refills: 0 | Status: CANCELLED | OUTPATIENT
Start: 2024-06-03

## 2024-06-03 NOTE — PROGRESS NOTES
MA Rooming Questions  Patient: Surekha Cota  MRN: [id number]    Date: 6/3/2024        1. Do you have any new issues?   no         2. Do you need any refills on medications?    yes - Vitamin D    3. Have you had any imaging done since your last visit?   no    4. Have you been hospitalized or seen in the emergency room since your last visit here?   no    5. Did the patient have a depression screening completed today? No    No data recorded     PHQ-9 Given to (if applicable):               PHQ-9 Score (if applicable):                     [] Positive     []  Negative              Does question #9 need addressed (if applicable)                     [] Yes    []  No               Erendira Wiggins MA

## 2024-06-03 NOTE — PROGRESS NOTES
Patient Name: Surekha Cota  Patient : 1980  Patient MRN: [id number]     Primary Oncologist: Ele Sargent MD  PCP: [facility]     Date of Service: 6/3/2024      Chief Complaint:   Chief Complaint   Patient presents with    Follow-up        Active Problem list  1. Malabsorption of iron    2. Iron deficiency anemia, unspecified iron deficiency anemia type           HPI:        3/11/19: Arrived alone to the clinic. Reported that she had the gastric sleeve in 2017, lost 45 lbs. Reported that her menstrual periods are heavy at times, associated with a lot of clots, would use 8-10 pads per day for the first three days and then lighter for the next 3-4 days. No other overt bleeding. Reported fatigue. PICKIMBERLY sx. reported that she has a craving to smell alcohol pads. Intermittent chest pain. Has been evaluated and was told that she has reflux Sx. Denied any sob. No palpitattions, dizziness. Intermittent lower abdominal pain, but denied any nausea, emesis, constipation or any diarrhea. Denied any  sx. No fevr, night sweats, weight loss, lad. No vision changes, focal weakness. Reported intermittent ha, associated with smelling of alcohol pads. No dysphagia, odynophagia. STNA at acute rehab.  3/4/19: CBC with wbc 5.5, normal diff, Hb 10.1, Hct 41, mcv 75, platelets 294  CMP wnl  Ferritin 5 and iron sats 5%    3/17/2019 CT scan of the abdomen pelvis without contrast revealed nonobstructing 2 mm left renal calculus. Liver spleen pancreas and bilateral adrenal glands are normal. Gallbladder is near completely collapsed. Postsurgical changes of prior gastric sleeve. No suspicious osteolytic or osteoblastic lesions.     Received Ferriheme  CBC with wbc 3.7,anc 1800, Hb 8.8, Hct 29, mcv 69, paltelets 294, ferritin 6, iron sats 4%, b12 451, folate 16, Vitamin D 30    Received Infed 2021     US RP and abd ordered for tomorrow 21 Ultrasound:  Normal uterus with an

## 2024-06-05 ENCOUNTER — TELEPHONE (OUTPATIENT)
Dept: INFUSION THERAPY | Age: 44
End: 2024-06-05

## 2024-06-19 ENCOUNTER — HOSPITAL ENCOUNTER (OUTPATIENT)
Dept: INFUSION THERAPY | Age: 44
Discharge: HOME | End: 2024-06-19
Payer: COMMERCIAL

## 2024-06-19 VITALS
SYSTOLIC BLOOD PRESSURE: 130 MMHG | RESPIRATION RATE: 16 BRPM | DIASTOLIC BLOOD PRESSURE: 72 MMHG | WEIGHT: 241.25 LBS | BODY MASS INDEX: 37.87 KG/M2 | OXYGEN SATURATION: 98 % | HEIGHT: 67 IN | TEMPERATURE: 98 F | HEART RATE: 84 BPM

## 2024-06-19 DIAGNOSIS — K90.9 MALABSORPTION OF IRON: ICD-10-CM

## 2024-06-19 DIAGNOSIS — D50.0 IRON DEFICIENCY ANEMIA SECONDARY TO BLOOD LOSS (CHRONIC): Primary | ICD-10-CM

## 2024-06-19 PROCEDURE — 96366 THER/PROPH/DIAG IV INF ADDON: CPT

## 2024-06-19 PROCEDURE — 6360000002 HC RX W HCPCS: Performed by: INTERNAL MEDICINE

## 2024-06-19 PROCEDURE — 96365 THER/PROPH/DIAG IV INF INIT: CPT

## 2024-06-19 PROCEDURE — 96367 TX/PROPH/DG ADDL SEQ IV INF: CPT

## 2024-06-19 PROCEDURE — 6370000000 HC RX 637 (ALT 250 FOR IP): Performed by: INTERNAL MEDICINE

## 2024-06-19 PROCEDURE — 2580000003 HC RX 258: Performed by: INTERNAL MEDICINE

## 2024-06-19 RX ORDER — DIPHENHYDRAMINE HYDROCHLORIDE 50 MG/ML
50 INJECTION INTRAMUSCULAR; INTRAVENOUS
Status: CANCELLED | OUTPATIENT
Start: 2024-06-19

## 2024-06-19 RX ORDER — EPINEPHRINE 1 MG/ML
0.3 INJECTION, SOLUTION, CONCENTRATE INTRAVENOUS PRN
Status: CANCELLED | OUTPATIENT
Start: 2024-06-19

## 2024-06-19 RX ORDER — SODIUM CHLORIDE 9 MG/ML
INJECTION, SOLUTION INTRAVENOUS CONTINUOUS
Status: CANCELLED | OUTPATIENT
Start: 2024-06-19

## 2024-06-19 RX ORDER — ACETAMINOPHEN 325 MG/1
650 TABLET ORAL
Status: CANCELLED | OUTPATIENT
Start: 2024-06-19

## 2024-06-19 RX ORDER — DEXAMETHASONE SODIUM PHOSPHATE 4 MG/ML
10 INJECTION, SOLUTION INTRA-ARTICULAR; INTRALESIONAL; INTRAMUSCULAR; INTRAVENOUS; SOFT TISSUE ONCE
Status: CANCELLED | OUTPATIENT
Start: 2024-06-19 | End: 2024-06-19

## 2024-06-19 RX ORDER — MEPERIDINE HYDROCHLORIDE 25 MG/ML
12.5 INJECTION INTRAMUSCULAR; INTRAVENOUS; SUBCUTANEOUS PRN
Status: CANCELLED | OUTPATIENT
Start: 2024-06-19

## 2024-06-19 RX ORDER — SODIUM CHLORIDE 9 MG/ML
5-250 INJECTION, SOLUTION INTRAVENOUS PRN
Status: CANCELLED | OUTPATIENT
Start: 2024-06-19

## 2024-06-19 RX ORDER — ALBUTEROL SULFATE 90 UG/1
4 AEROSOL, METERED RESPIRATORY (INHALATION) PRN
Status: CANCELLED | OUTPATIENT
Start: 2024-06-19

## 2024-06-19 RX ORDER — FAMOTIDINE 10 MG/ML
20 INJECTION, SOLUTION INTRAVENOUS
Status: CANCELLED | OUTPATIENT
Start: 2024-06-19

## 2024-06-19 RX ORDER — SODIUM CHLORIDE 0.9 % (FLUSH) 0.9 %
5-40 SYRINGE (ML) INJECTION PRN
Status: CANCELLED | OUTPATIENT
Start: 2024-06-19

## 2024-06-19 RX ORDER — SODIUM CHLORIDE 9 MG/ML
5-250 INJECTION, SOLUTION INTRAVENOUS PRN
Status: DISCONTINUED | OUTPATIENT
Start: 2024-06-19 | End: 2024-06-19

## 2024-06-19 RX ORDER — DEXAMETHASONE SODIUM PHOSPHATE 4 MG/ML
10 INJECTION, SOLUTION INTRA-ARTICULAR; INTRALESIONAL; INTRAMUSCULAR; INTRAVENOUS; SOFT TISSUE ONCE
Status: DISCONTINUED | OUTPATIENT
Start: 2024-06-19 | End: 2024-06-19 | Stop reason: SDUPTHER

## 2024-06-19 RX ORDER — ACETAMINOPHEN 325 MG/1
650 TABLET ORAL ONCE
Status: CANCELLED | OUTPATIENT
Start: 2024-06-19 | End: 2024-06-19

## 2024-06-19 RX ORDER — HEPARIN 100 UNIT/ML
500 SYRINGE INTRAVENOUS PRN
Status: CANCELLED | OUTPATIENT
Start: 2024-06-19

## 2024-06-19 RX ORDER — ACETAMINOPHEN 325 MG/1
650 TABLET ORAL ONCE
Status: COMPLETED | OUTPATIENT
Start: 2024-06-19 | End: 2024-06-19

## 2024-06-19 RX ORDER — SODIUM CHLORIDE 0.9 % (FLUSH) 0.9 %
5-40 SYRINGE (ML) INJECTION PRN
Status: DISCONTINUED | OUTPATIENT
Start: 2024-06-19 | End: 2024-06-19

## 2024-06-19 RX ORDER — ONDANSETRON 2 MG/ML
8 INJECTION INTRAMUSCULAR; INTRAVENOUS
Status: CANCELLED | OUTPATIENT
Start: 2024-06-19

## 2024-06-19 RX ADMIN — ACETAMINOPHEN 650 MG: 325 TABLET ORAL at 09:48

## 2024-06-19 RX ADMIN — DEXAMETHASONE SODIUM PHOSPHATE 10 MG: 10 INJECTION INTRAMUSCULAR; INTRAVENOUS at 09:48

## 2024-06-19 RX ADMIN — SODIUM CHLORIDE 1000 MG: 9 INJECTION, SOLUTION INTRAVENOUS at 10:16

## 2024-06-19 NOTE — PROGRESS NOTES
Ambulated to treatment suite for Infed Iron infusion. Patient has no concerns at this time. PIV placed in Left AC blood return noted. Treatment approved and given. Call light within reach.  Premedications given  ordered. Tolerated infusion without incident. 30 minute observation complete with no s/s of reaction. Discharge instructions declined, left treatment suite ambulatory.

## 2024-08-09 ENCOUNTER — TELEPHONE (OUTPATIENT)
Dept: FAMILY MEDICINE CLINIC | Age: 44
End: 2024-08-09

## 2024-08-29 ENCOUNTER — OFFICE VISIT (OUTPATIENT)
Dept: FAMILY MEDICINE CLINIC | Age: 44
End: 2024-08-29
Payer: COMMERCIAL

## 2024-08-29 VITALS
BODY MASS INDEX: 37.83 KG/M2 | OXYGEN SATURATION: 96 % | DIASTOLIC BLOOD PRESSURE: 74 MMHG | HEART RATE: 85 BPM | SYSTOLIC BLOOD PRESSURE: 136 MMHG | WEIGHT: 241 LBS | HEIGHT: 67 IN

## 2024-08-29 DIAGNOSIS — R19.7 DIARRHEA, UNSPECIFIED TYPE: ICD-10-CM

## 2024-08-29 DIAGNOSIS — R53.83 OTHER FATIGUE: ICD-10-CM

## 2024-08-29 DIAGNOSIS — E66.01 CLASS 2 SEVERE OBESITY DUE TO EXCESS CALORIES WITH SERIOUS COMORBIDITY AND BODY MASS INDEX (BMI) OF 37.0 TO 37.9 IN ADULT (HCC): ICD-10-CM

## 2024-08-29 DIAGNOSIS — Z98.84 STATUS POST LAPAROSCOPIC SLEEVE GASTRECTOMY: ICD-10-CM

## 2024-08-29 DIAGNOSIS — Z13.1 SCREENING FOR DIABETES MELLITUS: ICD-10-CM

## 2024-08-29 DIAGNOSIS — R73.09 ELEVATED GLUCOSE: ICD-10-CM

## 2024-08-29 DIAGNOSIS — G47.33 OSA (OBSTRUCTIVE SLEEP APNEA): ICD-10-CM

## 2024-08-29 DIAGNOSIS — D50.9 IRON DEFICIENCY ANEMIA, UNSPECIFIED IRON DEFICIENCY ANEMIA TYPE: ICD-10-CM

## 2024-08-29 DIAGNOSIS — E11.9 TYPE II DIABETES MELLITUS WITH HBA1C GOAL TO BE DETERMINED (HCC): ICD-10-CM

## 2024-08-29 DIAGNOSIS — Z76.89 ENCOUNTER TO ESTABLISH CARE: Primary | ICD-10-CM

## 2024-08-29 DIAGNOSIS — E55.9 VITAMIN D DEFICIENCY: ICD-10-CM

## 2024-08-29 DIAGNOSIS — K90.9 MALABSORPTION OF IRON: ICD-10-CM

## 2024-08-29 DIAGNOSIS — Z13.220 SCREENING FOR LIPID DISORDERS: ICD-10-CM

## 2024-08-29 DIAGNOSIS — K58.2 IRRITABLE BOWEL SYNDROME WITH CONSTIPATION AND DIARRHEA: ICD-10-CM

## 2024-08-29 PROCEDURE — 99214 OFFICE O/P EST MOD 30 MIN: CPT | Performed by: NURSE PRACTITIONER

## 2024-08-29 RX ORDER — TRIAMCINOLONE ACETONIDE 1 MG/G
CREAM TOPICAL
Qty: 28 G | Refills: 0 | Status: SHIPPED | OUTPATIENT
Start: 2024-08-29

## 2024-08-29 NOTE — PROGRESS NOTES
2024     Surekha Cota (:  1980) is a 44 y.o. female, here for evaluation of the following medical concerns:    Est care       MORENA   Follows with [facility] dr tyson   Iron infusions  - last done in may 2024      Gastric sleeve  -- chronic deficiencies since then   JERMAINE prior to weight loss.       Vit d def   Replaced w high dose in the past but not currently on a supp       Mammo benign may 2024      [facility]   Trying to get pregnant   Has 7 children  Had a tubal reversal with [facility]    On a  prenatal       Rash on her neck   Perfume reaction. Stopped three weeks ago and rash stayed the same. Itching. No open wounds       IBS  Chronic diarrhea intermitt. No blood   Wanting to see GI       DM2   Not medicated   Prev on metformin  A1c 5.3 2023        BMI37      Review of Systems    Prior to Visit Medications    Medication Sig Taking? Authorizing Provider   triamcinolone (KENALOG) 0.1 % cream Apply topically 2 times daily for 7 days to rash on neck . Yes Sofi Jack, APRN - NP   vitamin D (ERGOCALCIFEROL) 1.25 MG (09444 UT) CAPS capsule Take 1 capsule by mouth once a week for 8 weeks Yes Ele Tyson MD   ibuprofen (ADVIL;MOTRIN) 800 MG tablet Take 1 tablet by mouth every 8 hours as needed for Pain Yes Izaiah Hughes MD   Prenatal Vit-Fe Fumarate-FA (PRENATAL VITAMINS) 28-0.8 MG TABS Take 1 tablet by mouth daily Yes Medina Shelton PA-C   ferrous sulfate dried (SLOW FE) 160 (50 Fe) MG TBCR extended release tablet Take 160 mg by mouth 2 times daily Yes Medina Shelton PA-C        Social History     Tobacco Use    Smoking status: Former     Current packs/day: 0.00     Average packs/day: 0.5 packs/day for 13.0 years (6.5 ttl pk-yrs)     Types: Cigarettes     Start date:      Quit date: 2008     Years since quittin.6    Smokeless tobacco: Never   Substance Use Topics    Alcohol use: Not Currently     Comment: Caffeine: 3cups coffee day        Vitals:

## 2024-09-05 PROBLEM — R63.4 WEIGHT LOSS: Status: RESOLVED | Noted: 2018-01-17 | Resolved: 2024-09-05

## 2024-09-05 PROBLEM — R07.2 PRECORDIAL PAIN: Status: RESOLVED | Noted: 2021-09-21 | Resolved: 2024-09-05

## 2024-09-05 PROBLEM — I10 HTN, GOAL BELOW 140/90: Status: RESOLVED | Noted: 2017-03-14 | Resolved: 2024-09-05

## 2024-09-05 PROBLEM — D50.0 IRON DEFICIENCY ANEMIA SECONDARY TO BLOOD LOSS (CHRONIC): Status: RESOLVED | Noted: 2021-10-01 | Resolved: 2024-09-05

## 2024-09-05 PROBLEM — Z98.84 STATUS POST BARIATRIC SURGERY: Status: RESOLVED | Noted: 2017-09-14 | Resolved: 2024-09-05

## 2024-09-05 PROBLEM — G47.33 MODERATE OBSTRUCTIVE SLEEP APNEA: Status: RESOLVED | Noted: 2017-06-28 | Resolved: 2024-09-05

## 2024-09-05 PROBLEM — E55.9 VITAMIN D DEFICIENCY: Status: ACTIVE | Noted: 2024-09-05

## 2024-09-05 PROBLEM — E11.9: Status: ACTIVE | Noted: 2024-09-05

## 2024-09-09 ENCOUNTER — TELEPHONE (OUTPATIENT)
Dept: GASTROENTEROLOGY | Age: 44
End: 2024-09-09

## 2024-09-10 ENCOUNTER — HOSPITAL ENCOUNTER (OUTPATIENT)
Age: 44
Discharge: HOME | End: 2024-09-10
Payer: COMMERCIAL

## 2024-09-10 DIAGNOSIS — E11.9 TYPE II DIABETES MELLITUS WITH HBA1C GOAL TO BE DETERMINED (HCC): ICD-10-CM

## 2024-09-10 DIAGNOSIS — R73.09 ELEVATED GLUCOSE: ICD-10-CM

## 2024-09-10 DIAGNOSIS — E55.9 VITAMIN D DEFICIENCY: ICD-10-CM

## 2024-09-10 DIAGNOSIS — R53.83 OTHER FATIGUE: ICD-10-CM

## 2024-09-10 DIAGNOSIS — Z13.220 SCREENING FOR LIPID DISORDERS: ICD-10-CM

## 2024-09-10 LAB
25(OH)D3 SERPL-MCNC: 15 NG/ML (ref 30–150)
CHOLEST SERPL-MCNC: 131 MG/DL (ref 125–199)
EST. AVERAGE GLUCOSE BLD GHB EST-MCNC: 122 MG/DL
HBA1C MFR BLD: 5.9 % (ref 4.2–6.3)
HDLC SERPL-MCNC: 46 MG/DL
LDLC SERPL CALC-MCNC: 71 MG/DL
TRIGL SERPL-MCNC: 71 MG/DL
TSH SERPL DL<=0.05 MIU/L-ACNC: 2.55 UIU/ML (ref 0.27–4.2)

## 2024-09-10 PROCEDURE — 36415 COLL VENOUS BLD VENIPUNCTURE: CPT

## 2024-09-10 PROCEDURE — 82306 VITAMIN D 25 HYDROXY: CPT

## 2024-09-10 PROCEDURE — 80061 LIPID PANEL: CPT

## 2024-09-10 PROCEDURE — 84443 ASSAY THYROID STIM HORMONE: CPT

## 2024-09-10 PROCEDURE — 83036 HEMOGLOBIN GLYCOSYLATED A1C: CPT

## 2024-09-11 ENCOUNTER — HOSPITAL ENCOUNTER (OUTPATIENT)
Age: 44
Setting detail: SPECIMEN
Discharge: HOME | End: 2024-09-11
Payer: COMMERCIAL

## 2024-09-11 LAB
CREAT UR-MCNC: 219 MG/DL (ref 28–217)
MICROALBUMIN UR-MCNC: <1 MG/L
MICROALBUMIN/CREAT UR-RTO: ABNORMAL MCG/MG CREAT (ref 0–2)

## 2024-09-11 PROCEDURE — 82570 ASSAY OF URINE CREATININE: CPT

## 2024-09-11 PROCEDURE — 82043 UR ALBUMIN QUANTITATIVE: CPT

## 2024-09-16 DIAGNOSIS — E55.9 VITAMIN D DEFICIENCY: Primary | ICD-10-CM

## 2024-09-16 RX ORDER — ERGOCALCIFEROL 1.25 MG/1
50000 CAPSULE, LIQUID FILLED ORAL WEEKLY
Qty: 12 CAPSULE | Refills: 0 | Status: SHIPPED | OUTPATIENT
Start: 2024-09-16

## 2024-10-02 ENCOUNTER — OFFICE VISIT (OUTPATIENT)
Dept: GASTROENTEROLOGY | Age: 44
End: 2024-10-02
Payer: COMMERCIAL

## 2024-10-02 VITALS
BODY MASS INDEX: 36.57 KG/M2 | DIASTOLIC BLOOD PRESSURE: 70 MMHG | RESPIRATION RATE: 16 BRPM | WEIGHT: 233 LBS | HEART RATE: 92 BPM | SYSTOLIC BLOOD PRESSURE: 126 MMHG | HEIGHT: 67 IN | OXYGEN SATURATION: 98 %

## 2024-10-02 DIAGNOSIS — R14.0 ABDOMINAL BLOATING: ICD-10-CM

## 2024-10-02 DIAGNOSIS — K58.2 IRRITABLE BOWEL SYNDROME WITH MIXED BOWEL HABITS: ICD-10-CM

## 2024-10-02 DIAGNOSIS — Z98.84 STATUS POST LAPAROSCOPIC SLEEVE GASTRECTOMY: ICD-10-CM

## 2024-10-02 DIAGNOSIS — D50.0 IRON DEFICIENCY ANEMIA SECONDARY TO BLOOD LOSS (CHRONIC): Primary | ICD-10-CM

## 2024-10-02 PROCEDURE — 99214 OFFICE O/P EST MOD 30 MIN: CPT | Performed by: INTERNAL MEDICINE

## 2024-10-02 RX ORDER — DICYCLOMINE HYDROCHLORIDE 10 MG/1
10 CAPSULE ORAL 2 TIMES DAILY PRN
Qty: 360 CAPSULE | Refills: 0 | Status: SHIPPED | OUTPATIENT
Start: 2024-10-02 | End: 2024-10-02

## 2024-10-02 RX ORDER — DICYCLOMINE HYDROCHLORIDE 10 MG/1
10 CAPSULE ORAL 2 TIMES DAILY PRN
Qty: 60 CAPSULE | Refills: 1 | Status: SHIPPED | OUTPATIENT
Start: 2024-10-02

## 2024-10-02 RX ORDER — SIMETHICONE 125 MG
125 TABLET,CHEWABLE ORAL EVERY 6 HOURS PRN
Qty: 60 TABLET | Refills: 3 | Status: SHIPPED | OUTPATIENT
Start: 2024-10-02

## 2024-10-02 NOTE — PROGRESS NOTES
Drug use: No    Sexual activity: Not Currently   Other Topics Concern    Not on file   Social History Narrative    Not on file     Social Determinants of Health     Financial Resource Strain: Low Risk  (9/25/2023)    Overall Financial Resource Strain (CARDIA)     Difficulty of Paying Living Expenses: Not hard at all   Food Insecurity: Not on file (9/25/2023)   Transportation Needs: Unknown (9/25/2023)    PRAPARE - Transportation     Lack of Transportation (Medical): Not on file     Lack of Transportation (Non-Medical): No   Physical Activity: Not on file   Stress: Not on file   Social Connections: Not on file   Intimate Partner Violence: Not on file   Housing Stability: Unknown (9/25/2023)    Housing Stability Vital Sign     Unable to Pay for Housing in the Last Year: Not on file     Number of Places Lived in the Last Year: Not on file     Unstable Housing in the Last Year: No        Social History     Social History Narrative    Not on file           Review of Systems  Reviewed all 14 systems with patient/family member. Pertinent items in HPI.     Medications:    Current Outpatient Medications:     vitamin D (ERGOCALCIFEROL) 1.25 MG (12938 UT) CAPS capsule, Take 1 capsule by mouth once a week For 12 weeks only, Disp: 12 capsule, Rfl: 0    triamcinolone (KENALOG) 0.1 % cream, Apply topically 2 times daily for 7 days to rash on neck ., Disp: 28 g, Rfl: 0    ibuprofen (ADVIL;MOTRIN) 800 MG tablet, Take 1 tablet by mouth every 8 hours as needed for Pain, Disp: 60 tablet, Rfl: 2    Prenatal Vit-Fe Fumarate-FA (PRENATAL VITAMINS) 28-0.8 MG TABS, Take 1 tablet by mouth daily, Disp: 90 tablet, Rfl: 2    ferrous sulfate dried (SLOW FE) 160 (50 Fe) MG TBCR extended release tablet, Take 160 mg by mouth 2 times daily, Disp: 30 tablet, Rfl: 0     Allergies:  Penicillins and Venofer [iron sucrose]     Objective:    Blood pressure 126/70, pulse 92, resp. rate 16, height 1.702 m (5' 7.01\"), weight 105.7 kg (233 lb), SpO2 98%, not

## 2024-12-16 ENCOUNTER — OFFICE VISIT (OUTPATIENT)
Dept: FAMILY MEDICINE CLINIC | Age: 44
End: 2024-12-16
Payer: COMMERCIAL

## 2024-12-16 VITALS
SYSTOLIC BLOOD PRESSURE: 116 MMHG | BODY MASS INDEX: 39.62 KG/M2 | DIASTOLIC BLOOD PRESSURE: 80 MMHG | HEART RATE: 81 BPM | OXYGEN SATURATION: 96 % | WEIGHT: 253 LBS

## 2024-12-16 DIAGNOSIS — E55.9 VITAMIN D DEFICIENCY: Primary | ICD-10-CM

## 2024-12-16 DIAGNOSIS — R80.8 OTHER PROTEINURIA: ICD-10-CM

## 2024-12-16 DIAGNOSIS — E11.9 TYPE II DIABETES MELLITUS WITH HBA1C GOAL TO BE DETERMINED (HCC): ICD-10-CM

## 2024-12-16 DIAGNOSIS — D50.9 IRON DEFICIENCY ANEMIA, UNSPECIFIED IRON DEFICIENCY ANEMIA TYPE: ICD-10-CM

## 2024-12-16 PROCEDURE — 99214 OFFICE O/P EST MOD 30 MIN: CPT | Performed by: NURSE PRACTITIONER

## 2024-12-16 PROCEDURE — 3044F HG A1C LEVEL LT 7.0%: CPT | Performed by: NURSE PRACTITIONER

## 2024-12-16 RX ORDER — METOCLOPRAMIDE 10 MG/1
10 TABLET ORAL
COMMUNITY
Start: 2024-09-29 | End: 2024-12-16

## 2024-12-16 SDOH — ECONOMIC STABILITY: FOOD INSECURITY: WITHIN THE PAST 12 MONTHS, YOU WORRIED THAT YOUR FOOD WOULD RUN OUT BEFORE YOU GOT MONEY TO BUY MORE.: NEVER TRUE

## 2024-12-16 SDOH — ECONOMIC STABILITY: TRANSPORTATION INSECURITY
IN THE PAST 12 MONTHS, HAS LACK OF TRANSPORTATION KEPT YOU FROM MEETINGS, WORK, OR FROM GETTING THINGS NEEDED FOR DAILY LIVING?: NO

## 2024-12-16 SDOH — ECONOMIC STABILITY: FOOD INSECURITY: WITHIN THE PAST 12 MONTHS, THE FOOD YOU BOUGHT JUST DIDN'T LAST AND YOU DIDN'T HAVE MONEY TO GET MORE.: NEVER TRUE

## 2024-12-16 SDOH — ECONOMIC STABILITY: INCOME INSECURITY: HOW HARD IS IT FOR YOU TO PAY FOR THE VERY BASICS LIKE FOOD, HOUSING, MEDICAL CARE, AND HEATING?: NOT HARD AT ALL

## 2024-12-16 NOTE — PROGRESS NOTES
deficiency anemia type          Results  Laboratory Studies  Creatinine urine ratio was 219. Hemoglobin A1c was 5.9. Vitamin D was 15.           Assessment & Plan  1. Vitamin D deficiency.  She is advised to continue her prenatal vitamins, which should contain a minimum of 1000 units of vitamin D daily. If she discontinues the prenatal vitamins, she should supplement with 1000 to 2000 units of vitamin D daily. A re-evaluation of her vitamin D levels will be conducted.    2. Iron deficiency.  She is advised to take iron supplements every other day or three times a week, as daily intake may cause gastrointestinal side effects. She should avoid consuming iron supplements with coffee, tea, caffeine, vitamin C, or calcium, as these can inhibit absorption. She will follow up with Dr. Aguiar for further management and lab re-evaluation.    3. Prediabetes.  Her hemoglobin A1c level is 5.9, indicating prediabetes. She is advised to engage in regular exercise and limit carbohydrate intake to help lower her A1c levels.    4. Proteinuria.  Her creatinine urine ratio is 219, slightly above the normal range of 217. In September 2023, her ratio was significantly elevated at 60. She is advised to avoid NSAIDs and limit caffeine, dark sodas, tea, coffee, and alcohol. She should aim to drink at least 60 ounces of water daily. A re-evaluation of her urine protein levels will be conducted. If her urine protein levels remain elevated after 3 months, a referral to a nephrologist will be considered.    Follow-up  The patient is scheduled for a follow-up visit in 3 months.               All care gaps addressed     All questions answered    Discussed use, benefit, and side effects of prescribed medications.  Barriers to compliance discussed.  All patient questions answered.  Pt voiced understanding.     Present to the ER for any emergent or acute symptoms not managed at home or in office.    Please note that this chart was generated

## 2025-01-02 ENCOUNTER — HOSPITAL ENCOUNTER (OUTPATIENT)
Age: 45
Discharge: HOME | End: 2025-01-02
Payer: COMMERCIAL

## 2025-01-02 DIAGNOSIS — E55.9 VITAMIN D DEFICIENCY: ICD-10-CM

## 2025-01-02 DIAGNOSIS — R73.09 ELEVATED GLUCOSE: ICD-10-CM

## 2025-01-02 DIAGNOSIS — D50.9 IRON DEFICIENCY ANEMIA, UNSPECIFIED IRON DEFICIENCY ANEMIA TYPE: ICD-10-CM

## 2025-01-02 DIAGNOSIS — R14.0 ABDOMINAL BLOATING: ICD-10-CM

## 2025-01-02 DIAGNOSIS — R80.8 OTHER PROTEINURIA: ICD-10-CM

## 2025-01-02 DIAGNOSIS — E11.9 TYPE II DIABETES MELLITUS WITH HBA1C GOAL TO BE DETERMINED (HCC): ICD-10-CM

## 2025-01-02 DIAGNOSIS — K90.9 MALABSORPTION OF IRON: ICD-10-CM

## 2025-01-02 DIAGNOSIS — E55.9 VITAMIN D DEFICIENCY: Primary | ICD-10-CM

## 2025-01-02 DIAGNOSIS — K58.2 IRRITABLE BOWEL SYNDROME WITH MIXED BOWEL HABITS: ICD-10-CM

## 2025-01-02 DIAGNOSIS — D50.0 IRON DEFICIENCY ANEMIA SECONDARY TO BLOOD LOSS (CHRONIC): ICD-10-CM

## 2025-01-02 LAB
25(OH)D3 SERPL-MCNC: 22.2 NG/ML (ref 30–150)
ALBUMIN SERPL-MCNC: 4 G/DL (ref 3.4–5)
ALBUMIN/GLOB SERPL: 1.4 {RATIO} (ref 1.1–2.2)
ALP SERPL-CCNC: 75 U/L (ref 40–129)
ALT SERPL-CCNC: 8 U/L (ref 10–40)
ANION GAP SERPL CALCULATED.3IONS-SCNC: 10 MMOL/L (ref 9–17)
AST SERPL-CCNC: 5 U/L (ref 15–37)
BASOPHILS # BLD: 0.06 K/UL
BASOPHILS NFR BLD: 1 % (ref 0–1)
BILIRUB SERPL-MCNC: 0.8 MG/DL (ref 0–1)
BUN SERPL-MCNC: 12 MG/DL (ref 7–20)
CALCIUM SERPL-MCNC: 9.2 MG/DL (ref 8.3–10.6)
CHLORIDE SERPL-SCNC: 103 MMOL/L (ref 99–110)
CO2 SERPL-SCNC: 25 MMOL/L (ref 21–32)
CREAT SERPL-MCNC: 0.6 MG/DL (ref 0.6–1.1)
CREAT UR-MCNC: 186 MG/DL (ref 28–217)
EOSINOPHIL # BLD: 0.2 K/UL
EOSINOPHILS RELATIVE PERCENT: 4 % (ref 0–3)
ERYTHROCYTE [DISTWIDTH] IN BLOOD BY AUTOMATED COUNT: 16.3 % (ref 11.7–14.9)
FERRITIN SERPL-MCNC: 6 NG/ML (ref 15–150)
FOLATE SERPL-MCNC: 23.6 NG/ML (ref 4.8–24.2)
GFR, ESTIMATED: >90 ML/MIN/1.73M2
GLUCOSE SERPL-MCNC: 135 MG/DL (ref 74–99)
HCT VFR BLD AUTO: 31.6 % (ref 37–47)
HGB BLD-MCNC: 9.1 G/DL (ref 12.5–16)
IGA SERPL-MCNC: 257 MG/DL (ref 70–400)
IMM GRANULOCYTES # BLD AUTO: 0.02 K/UL
IMM GRANULOCYTES NFR BLD: 0 %
IRON SATN MFR SERPL: 4 % (ref 15–50)
IRON SERPL-MCNC: 18 UG/DL (ref 37–145)
LYMPHOCYTES NFR BLD: 2.32 K/UL
LYMPHOCYTES RELATIVE PERCENT: 40 % (ref 24–44)
MCH RBC QN AUTO: 20.4 PG (ref 27–31)
MCHC RBC AUTO-ENTMCNC: 28.8 G/DL (ref 32–36)
MCV RBC AUTO: 71 FL (ref 78–100)
MICROALBUMIN UR-MCNC: <1 MG/L
MICROALBUMIN/CREAT UR-RTO: NORMAL MCG/MG CREAT (ref 0–2)
MONOCYTES NFR BLD: 0.41 K/UL
MONOCYTES NFR BLD: 7 % (ref 0–4)
NEUTROPHILS NFR BLD: 48 % (ref 36–66)
NEUTS SEG NFR BLD: 2.78 K/UL
PLATELET # BLD AUTO: 329 K/UL (ref 140–440)
PMV BLD AUTO: 10.3 FL (ref 7.5–11.1)
POTASSIUM SERPL-SCNC: 4 MMOL/L (ref 3.5–5.1)
PROT SERPL-MCNC: 6.9 G/DL (ref 6.4–8.2)
RBC # BLD AUTO: 4.45 M/UL (ref 4.2–5.4)
SODIUM SERPL-SCNC: 138 MMOL/L (ref 136–145)
TIBC SERPL-MCNC: 418 UG/DL (ref 260–445)
UNSATURATED IRON BINDING CAPACITY: 400 UG/DL (ref 110–370)
VIT B12 SERPL-MCNC: 506 PG/ML (ref 211–911)
WBC OTHER # BLD: 5.8 K/UL (ref 4–10.5)

## 2025-01-02 PROCEDURE — 82728 ASSAY OF FERRITIN: CPT

## 2025-01-02 PROCEDURE — 83516 IMMUNOASSAY NONANTIBODY: CPT

## 2025-01-02 PROCEDURE — 85025 COMPLETE CBC W/AUTO DIFF WBC: CPT

## 2025-01-02 PROCEDURE — 82746 ASSAY OF FOLIC ACID SERUM: CPT

## 2025-01-02 PROCEDURE — 83540 ASSAY OF IRON: CPT

## 2025-01-02 PROCEDURE — 82306 VITAMIN D 25 HYDROXY: CPT

## 2025-01-02 PROCEDURE — 82784 ASSAY IGA/IGD/IGG/IGM EACH: CPT

## 2025-01-02 PROCEDURE — 82043 UR ALBUMIN QUANTITATIVE: CPT

## 2025-01-02 PROCEDURE — 82570 ASSAY OF URINE CREATININE: CPT

## 2025-01-02 PROCEDURE — 82607 VITAMIN B-12: CPT

## 2025-01-02 PROCEDURE — 83550 IRON BINDING TEST: CPT

## 2025-01-02 PROCEDURE — 80053 COMPREHEN METABOLIC PANEL: CPT

## 2025-01-02 RX ORDER — ERGOCALCIFEROL 1.25 MG/1
50000 CAPSULE, LIQUID FILLED ORAL WEEKLY
Qty: 12 CAPSULE | Refills: 0 | Status: SHIPPED | OUTPATIENT
Start: 2025-01-02

## 2025-01-03 ENCOUNTER — HOSPITAL ENCOUNTER (OUTPATIENT)
Dept: INFUSION THERAPY | Age: 45
Discharge: HOME | End: 2025-01-03
Payer: COMMERCIAL

## 2025-01-03 ENCOUNTER — OFFICE VISIT (OUTPATIENT)
Dept: ONCOLOGY | Age: 45
End: 2025-01-03
Payer: COMMERCIAL

## 2025-01-03 VITALS
BODY MASS INDEX: 38.55 KG/M2 | TEMPERATURE: 97.3 F | RESPIRATION RATE: 16 BRPM | DIASTOLIC BLOOD PRESSURE: 74 MMHG | HEIGHT: 67 IN | HEART RATE: 86 BPM | SYSTOLIC BLOOD PRESSURE: 115 MMHG | OXYGEN SATURATION: 100 % | WEIGHT: 245.6 LBS

## 2025-01-03 DIAGNOSIS — D50.0 IRON DEFICIENCY ANEMIA SECONDARY TO BLOOD LOSS (CHRONIC): ICD-10-CM

## 2025-01-03 DIAGNOSIS — K90.9 INTESTINAL MALABSORPTION, UNSPECIFIED TYPE: ICD-10-CM

## 2025-01-03 DIAGNOSIS — K90.9 MALABSORPTION OF IRON: Primary | ICD-10-CM

## 2025-01-03 DIAGNOSIS — D50.9 IRON DEFICIENCY ANEMIA, UNSPECIFIED IRON DEFICIENCY ANEMIA TYPE: Primary | ICD-10-CM

## 2025-01-03 DIAGNOSIS — D50.9 IRON DEFICIENCY ANEMIA, UNSPECIFIED IRON DEFICIENCY ANEMIA TYPE: ICD-10-CM

## 2025-01-03 LAB — TISSUE TRANSGLUTAMINASE AB, IGA: <0.5 U/ML (ref 0–14)

## 2025-01-03 PROCEDURE — 99214 OFFICE O/P EST MOD 30 MIN: CPT | Performed by: INTERNAL MEDICINE

## 2025-01-03 PROCEDURE — 99211 OFF/OP EST MAY X REQ PHY/QHP: CPT

## 2025-01-03 RX ORDER — HEPARIN SODIUM (PORCINE) LOCK FLUSH IV SOLN 100 UNIT/ML 100 UNIT/ML
500 SOLUTION INTRAVENOUS PRN
OUTPATIENT
Start: 2025-01-03

## 2025-01-03 RX ORDER — FAMOTIDINE 10 MG/ML
20 INJECTION, SOLUTION INTRAVENOUS
OUTPATIENT
Start: 2025-01-03

## 2025-01-03 RX ORDER — HYDROCORTISONE SODIUM SUCCINATE 100 MG/2ML
100 INJECTION INTRAMUSCULAR; INTRAVENOUS
OUTPATIENT
Start: 2025-01-03

## 2025-01-03 RX ORDER — EPINEPHRINE 1 MG/ML
0.3 INJECTION, SOLUTION, CONCENTRATE INTRAVENOUS PRN
OUTPATIENT
Start: 2025-01-03

## 2025-01-03 RX ORDER — ACETAMINOPHEN 325 MG/1
650 TABLET ORAL
OUTPATIENT
Start: 2025-01-03

## 2025-01-03 RX ORDER — DIPHENHYDRAMINE HYDROCHLORIDE 50 MG/ML
50 INJECTION INTRAMUSCULAR; INTRAVENOUS
OUTPATIENT
Start: 2025-01-03

## 2025-01-03 RX ORDER — SODIUM CHLORIDE 0.9 % (FLUSH) 0.9 %
5-40 SYRINGE (ML) INJECTION PRN
OUTPATIENT
Start: 2025-01-03

## 2025-01-03 RX ORDER — ACETAMINOPHEN 325 MG/1
650 TABLET ORAL ONCE
OUTPATIENT
Start: 2025-01-03 | End: 2025-01-03

## 2025-01-03 RX ORDER — SODIUM CHLORIDE 9 MG/ML
5-250 INJECTION, SOLUTION INTRAVENOUS PRN
OUTPATIENT
Start: 2025-01-03

## 2025-01-03 RX ORDER — MEPERIDINE HYDROCHLORIDE 50 MG/ML
12.5 INJECTION INTRAMUSCULAR; INTRAVENOUS; SUBCUTANEOUS PRN
OUTPATIENT
Start: 2025-01-03

## 2025-01-03 RX ORDER — SODIUM CHLORIDE 9 MG/ML
INJECTION, SOLUTION INTRAVENOUS CONTINUOUS
OUTPATIENT
Start: 2025-01-03

## 2025-01-03 RX ORDER — ONDANSETRON 2 MG/ML
8 INJECTION INTRAMUSCULAR; INTRAVENOUS
OUTPATIENT
Start: 2025-01-03

## 2025-01-03 RX ORDER — ALBUTEROL SULFATE 90 UG/1
4 INHALANT RESPIRATORY (INHALATION) PRN
OUTPATIENT
Start: 2025-01-03

## 2025-01-03 ASSESSMENT — PATIENT HEALTH QUESTIONNAIRE - PHQ9
SUM OF ALL RESPONSES TO PHQ QUESTIONS 1-9: 0
SUM OF ALL RESPONSES TO PHQ9 QUESTIONS 1 & 2: 0
1. LITTLE INTEREST OR PLEASURE IN DOING THINGS: NOT AT ALL
SUM OF ALL RESPONSES TO PHQ QUESTIONS 1-9: 0
SUM OF ALL RESPONSES TO PHQ QUESTIONS 1-9: 0
2. FEELING DOWN, DEPRESSED OR HOPELESS: NOT AT ALL
SUM OF ALL RESPONSES TO PHQ QUESTIONS 1-9: 0

## 2025-01-03 NOTE — PROGRESS NOTES
Patient Name: Surekha Cota  Patient : 1980  Patient MRN: [id number]     Primary Oncologist: Ele Sargent MD  PCP: [facility]     Date of Service: 1/3/2025      Chief Complaint:   Chief Complaint   Patient presents with    Follow-up        Active Problem list  1. Malabsorption of iron    2. Iron deficiency anemia, unspecified iron deficiency anemia type           HPI:        3/11/19: Arrived alone to the clinic. Reported that she had the gastric sleeve in 2017, lost 45 lbs. Reported that her menstrual periods are heavy at times, associated with a lot of clots, would use 8-10 pads per day for the first three days and then lighter for the next 3-4 days. No other overt bleeding. Reported fatigue. PICKIMBERLY sx. reported that she has a craving to smell alcohol pads. Intermittent chest pain. Has been evaluated and was told that she has reflux Sx. Denied any sob. No palpitattions, dizziness. Intermittent lower abdominal pain, but denied any nausea, emesis, constipation or any diarrhea. Denied any  sx. No fevr, night sweats, weight loss, lad. No vision changes, focal weakness. Reported intermittent ha, associated with smelling of alcohol pads. No dysphagia, odynophagia. STNA at acute rehab.  3/4/19: CBC with wbc 5.5, normal diff, Hb 10.1, Hct 41, mcv 75, platelets 294  CMP wnl  Ferritin 5 and iron sats 5%    3/17/2019 CT scan of the abdomen pelvis without contrast revealed nonobstructing 2 mm left renal calculus. Liver spleen pancreas and bilateral adrenal glands are normal. Gallbladder is near completely collapsed. Postsurgical changes of prior gastric sleeve. No suspicious osteolytic or osteoblastic lesions.     Received Ferriheme  CBC with wbc 3.7,anc 1800, Hb 8.8, Hct 29, mcv 69, paltelets 294, ferritin 6, iron sats 4%, b12 451, folate 16, Vitamin D 30    Received Infed 2021     US RP and abd ordered for tomorrow 21 Ultrasound:  Normal uterus with an  Yes

## 2025-01-03 NOTE — PROGRESS NOTES
MA Rooming Questions  Patient: Surekha Cota  MRN: [id number]    Date: 1/3/2025        1. Do you have any new issues?   no         2. Do you need any refills on medications?    no    3. Have you had any imaging done since your last visit?   no    4. Have you been hospitalized or seen in the emergency room since your last visit here?   no    5. Did the patient have a depression screening completed today? Yes    No data recorded     PHQ-9 Given to (if applicable):               PHQ-9 Score (if applicable):                     [] Positive     []  Negative              Does question #9 need addressed (if applicable)                     [] Yes    []  No               Shasha Conn MA

## 2025-01-13 ENCOUNTER — TELEPHONE (OUTPATIENT)
Dept: INFUSION THERAPY | Age: 45
End: 2025-01-13

## 2025-01-21 ENCOUNTER — HOSPITAL ENCOUNTER (OUTPATIENT)
Dept: INFUSION THERAPY | Age: 45
Discharge: HOME | End: 2025-01-21
Payer: COMMERCIAL

## 2025-01-21 VITALS
DIASTOLIC BLOOD PRESSURE: 82 MMHG | WEIGHT: 256 LBS | HEIGHT: 67 IN | HEART RATE: 84 BPM | TEMPERATURE: 97.8 F | SYSTOLIC BLOOD PRESSURE: 123 MMHG | OXYGEN SATURATION: 100 % | RESPIRATION RATE: 16 BRPM | BODY MASS INDEX: 40.18 KG/M2

## 2025-01-21 DIAGNOSIS — D50.0 IRON DEFICIENCY ANEMIA SECONDARY TO BLOOD LOSS (CHRONIC): Primary | ICD-10-CM

## 2025-01-21 DIAGNOSIS — K90.9 MALABSORPTION OF IRON: ICD-10-CM

## 2025-01-21 PROCEDURE — 96366 THER/PROPH/DIAG IV INF ADDON: CPT

## 2025-01-21 PROCEDURE — 96365 THER/PROPH/DIAG IV INF INIT: CPT

## 2025-01-21 PROCEDURE — 2580000003 HC RX 258: Performed by: INTERNAL MEDICINE

## 2025-01-21 PROCEDURE — 6360000002 HC RX W HCPCS: Performed by: INTERNAL MEDICINE

## 2025-01-21 PROCEDURE — 6370000000 HC RX 637 (ALT 250 FOR IP): Performed by: INTERNAL MEDICINE

## 2025-01-21 PROCEDURE — 96375 TX/PRO/DX INJ NEW DRUG ADDON: CPT

## 2025-01-21 RX ORDER — MEPERIDINE HYDROCHLORIDE 25 MG/ML
12.5 INJECTION INTRAMUSCULAR; INTRAVENOUS; SUBCUTANEOUS PRN
Status: CANCELLED | OUTPATIENT
Start: 2025-01-21

## 2025-01-21 RX ORDER — EPINEPHRINE 1 MG/ML
0.3 INJECTION, SOLUTION, CONCENTRATE INTRAVENOUS PRN
Status: CANCELLED | OUTPATIENT
Start: 2025-01-21

## 2025-01-21 RX ORDER — SODIUM CHLORIDE 9 MG/ML
5-250 INJECTION, SOLUTION INTRAVENOUS PRN
Status: DISCONTINUED | OUTPATIENT
Start: 2025-01-21 | End: 2025-01-22 | Stop reason: HOSPADM

## 2025-01-21 RX ORDER — DEXAMETHASONE SODIUM PHOSPHATE 10 MG/ML
10 INJECTION INTRAMUSCULAR; INTRAVENOUS ONCE
Status: COMPLETED | OUTPATIENT
Start: 2025-01-21 | End: 2025-01-21

## 2025-01-21 RX ORDER — DIPHENHYDRAMINE HYDROCHLORIDE 50 MG/ML
50 INJECTION INTRAMUSCULAR; INTRAVENOUS
Status: CANCELLED | OUTPATIENT
Start: 2025-01-21

## 2025-01-21 RX ORDER — ALBUTEROL SULFATE 90 UG/1
4 INHALANT RESPIRATORY (INHALATION) PRN
Status: CANCELLED | OUTPATIENT
Start: 2025-01-21

## 2025-01-21 RX ORDER — DEXAMETHASONE SODIUM PHOSPHATE 10 MG/ML
10 INJECTION INTRAMUSCULAR; INTRAVENOUS ONCE
Status: CANCELLED | OUTPATIENT
Start: 2025-01-21 | End: 2025-01-21

## 2025-01-21 RX ORDER — FAMOTIDINE 10 MG/ML
20 INJECTION, SOLUTION INTRAVENOUS
Status: CANCELLED | OUTPATIENT
Start: 2025-01-21

## 2025-01-21 RX ORDER — SODIUM CHLORIDE 9 MG/ML
INJECTION, SOLUTION INTRAVENOUS CONTINUOUS
Status: CANCELLED | OUTPATIENT
Start: 2025-01-21

## 2025-01-21 RX ORDER — SODIUM CHLORIDE 9 MG/ML
5-250 INJECTION, SOLUTION INTRAVENOUS PRN
Status: CANCELLED | OUTPATIENT
Start: 2025-01-21

## 2025-01-21 RX ORDER — SODIUM CHLORIDE 0.9 % (FLUSH) 0.9 %
5-40 SYRINGE (ML) INJECTION PRN
Status: CANCELLED | OUTPATIENT
Start: 2025-01-21

## 2025-01-21 RX ORDER — ACETAMINOPHEN 325 MG/1
650 TABLET ORAL
Status: CANCELLED | OUTPATIENT
Start: 2025-01-21

## 2025-01-21 RX ORDER — SODIUM CHLORIDE 0.9 % (FLUSH) 0.9 %
5-40 SYRINGE (ML) INJECTION PRN
Status: DISCONTINUED | OUTPATIENT
Start: 2025-01-21 | End: 2025-01-22 | Stop reason: HOSPADM

## 2025-01-21 RX ORDER — ONDANSETRON 2 MG/ML
8 INJECTION INTRAMUSCULAR; INTRAVENOUS
Status: CANCELLED | OUTPATIENT
Start: 2025-01-21

## 2025-01-21 RX ORDER — ACETAMINOPHEN 325 MG/1
650 TABLET ORAL ONCE
Status: COMPLETED | OUTPATIENT
Start: 2025-01-21 | End: 2025-01-21

## 2025-01-21 RX ORDER — ACETAMINOPHEN 325 MG/1
650 TABLET ORAL ONCE
Status: CANCELLED | OUTPATIENT
Start: 2025-01-21 | End: 2025-01-21

## 2025-01-21 RX ORDER — HEPARIN 100 UNIT/ML
500 SYRINGE INTRAVENOUS PRN
Status: CANCELLED | OUTPATIENT
Start: 2025-01-21

## 2025-01-21 RX ORDER — HYDROCORTISONE SODIUM SUCCINATE 100 MG/2ML
100 INJECTION INTRAMUSCULAR; INTRAVENOUS
Status: CANCELLED | OUTPATIENT
Start: 2025-01-21

## 2025-01-21 RX ADMIN — DEXAMETHASONE SODIUM PHOSPHATE 10 MG: 10 INJECTION INTRAMUSCULAR; INTRAVENOUS at 09:01

## 2025-01-21 RX ADMIN — SODIUM CHLORIDE 1000 MG: 9 INJECTION, SOLUTION INTRAVENOUS at 09:20

## 2025-01-21 RX ADMIN — ACETAMINOPHEN 650 MG: 325 TABLET ORAL at 08:59

## 2025-01-21 ASSESSMENT — PAIN SCALES - GENERAL: PAINLEVEL_OUTOF10: 0

## 2025-03-17 ENCOUNTER — OFFICE VISIT (OUTPATIENT)
Dept: FAMILY MEDICINE CLINIC | Age: 45
End: 2025-03-17
Payer: COMMERCIAL

## 2025-03-17 VITALS
SYSTOLIC BLOOD PRESSURE: 108 MMHG | BODY MASS INDEX: 38.06 KG/M2 | TEMPERATURE: 97.7 F | OXYGEN SATURATION: 97 % | HEART RATE: 87 BPM | DIASTOLIC BLOOD PRESSURE: 72 MMHG | WEIGHT: 243 LBS

## 2025-03-17 DIAGNOSIS — K58.2 IRRITABLE BOWEL SYNDROME WITH CONSTIPATION AND DIARRHEA: ICD-10-CM

## 2025-03-17 DIAGNOSIS — R80.8 OTHER PROTEINURIA: ICD-10-CM

## 2025-03-17 DIAGNOSIS — K90.9 MALABSORPTION OF IRON: ICD-10-CM

## 2025-03-17 DIAGNOSIS — E11.9 TYPE II DIABETES MELLITUS WITH HBA1C GOAL TO BE DETERMINED (HCC): Primary | ICD-10-CM

## 2025-03-17 DIAGNOSIS — D50.9 IRON DEFICIENCY ANEMIA, UNSPECIFIED IRON DEFICIENCY ANEMIA TYPE: ICD-10-CM

## 2025-03-17 DIAGNOSIS — Z98.84 STATUS POST LAPAROSCOPIC SLEEVE GASTRECTOMY: ICD-10-CM

## 2025-03-17 DIAGNOSIS — E55.9 VITAMIN D DEFICIENCY: ICD-10-CM

## 2025-03-17 LAB — HBA1C MFR BLD: 5.8 %

## 2025-03-17 PROCEDURE — 99214 OFFICE O/P EST MOD 30 MIN: CPT | Performed by: NURSE PRACTITIONER

## 2025-03-17 PROCEDURE — 83036 HEMOGLOBIN GLYCOSYLATED A1C: CPT | Performed by: NURSE PRACTITIONER

## 2025-03-17 PROCEDURE — 3044F HG A1C LEVEL LT 7.0%: CPT | Performed by: NURSE PRACTITIONER

## 2025-03-17 RX ORDER — CLINDAMYCIN HYDROCHLORIDE 300 MG/1
CAPSULE ORAL
COMMUNITY
Start: 2025-03-10 | End: 2025-03-17

## 2025-03-17 RX ORDER — ERGOCALCIFEROL 1.25 MG/1
50000 CAPSULE, LIQUID FILLED ORAL WEEKLY
Qty: 12 CAPSULE | Refills: 0 | Status: SHIPPED | OUTPATIENT
Start: 2025-03-17

## 2025-03-17 SDOH — ECONOMIC STABILITY: FOOD INSECURITY: WITHIN THE PAST 12 MONTHS, THE FOOD YOU BOUGHT JUST DIDN'T LAST AND YOU DIDN'T HAVE MONEY TO GET MORE.: NEVER TRUE

## 2025-03-17 SDOH — ECONOMIC STABILITY: FOOD INSECURITY: WITHIN THE PAST 12 MONTHS, YOU WORRIED THAT YOUR FOOD WOULD RUN OUT BEFORE YOU GOT MONEY TO BUY MORE.: NEVER TRUE

## 2025-03-17 NOTE — PROGRESS NOTES
is obese. She is not ill-appearing, toxic-appearing or diaphoretic.   HENT:      Head: Normocephalic and atraumatic.      Nose: Nose normal.   Eyes:      Extraocular Movements: Extraocular movements intact.      Pupils: Pupils are equal, round, and reactive to light.   Cardiovascular:      Rate and Rhythm: Normal rate and regular rhythm.      Heart sounds: Normal heart sounds.   Pulmonary:      Effort: Pulmonary effort is normal.      Breath sounds: Normal breath sounds.   Abdominal:      General: Bowel sounds are normal. There is no distension.      Palpations: Abdomen is soft. There is no mass.      Tenderness: There is no abdominal tenderness (right lower pelvic).      Hernia: No hernia is present.   Musculoskeletal:         General: Normal range of motion.      Cervical back: Normal range of motion and neck supple.   Skin:     General: Skin is warm and dry.   Neurological:      General: No focal deficit present.      Mental Status: She is alert and oriented to person, place, and time. Mental status is at baseline.   Psychiatric:         Mood and Affect: Mood normal.         Behavior: Behavior normal.         Thought Content: Thought content normal.         Judgment: Judgment normal.       Physical Exam  Lungs sound normal.  Heart sounds normal.  Abdominal exam was performed.         ASSESSMENT/PLAN:  1. Type II diabetes mellitus with HbA1C goal to be determined (HCC)  -     POCT glycosylated hemoglobin (Hb A1C)  2. Vitamin D deficiency  -     vitamin D (ERGOCALCIFEROL) 1.25 MG (98372 UT) CAPS capsule; Take 1 capsule by mouth once a week For 12 weeks only, Disp-12 capsule, R-0Normal  3. Other proteinuria  4. Iron deficiency anemia, unspecified iron deficiency anemia type  5. Irritable bowel syndrome with constipation and diarrhea  6. Status post laparoscopic sleeve gastrectomy  7. Malabsorption of iron          Results  - Laboratory Studies:    - Urine protein: high in September 2023 and September 2024, normal in 
Detail Level: Zone
Continue Regimen: Zyrtec/ Claritin daily

## 2025-04-06 DIAGNOSIS — E55.9 VITAMIN D DEFICIENCY: ICD-10-CM

## 2025-04-07 RX ORDER — ERGOCALCIFEROL 1.25 MG/1
CAPSULE, LIQUID FILLED ORAL
Qty: 12 CAPSULE | Refills: 0 | OUTPATIENT
Start: 2025-04-07

## 2025-04-07 NOTE — TELEPHONE ENCOUNTER
3-month supply sent to the pharmacy previously.  Should not need refill..  After 3-month high-dose weekly, transition to 1000 units daily over-the-counter

## 2025-04-17 LAB
ALBUMIN: 4.3 G/DL
ALP BLD-CCNC: 10 U/L
ALT SERPL-CCNC: 10 U/L
ANION GAP SERPL CALCULATED.3IONS-SCNC: NORMAL MMOL/L
AST SERPL-CCNC: 11 U/L
BILIRUB SERPL-MCNC: 0.5 MG/DL (ref 0.1–1.4)
BUN BLDV-MCNC: 8 MG/DL
CALCIUM SERPL-MCNC: 8.9 MG/DL
CHLORIDE BLD-SCNC: 105 MMOL/L
CHOLESTEROL, TOTAL: 195 MG/DL
CHOLESTEROL/HDL RATIO: 3.3
CO2: 28 MMOL/L
CREAT SERPL-MCNC: 0.51 MG/DL
GFR, ESTIMATED: 118
GLUCOSE BLD-MCNC: 114 MG/DL
HDLC SERPL-MCNC: 60 MG/DL (ref 35–70)
LDL CHOLESTEROL: 114
MAGNESIUM: 2 MG/DL
NONHDLC SERPL-MCNC: 135 MG/DL
POTASSIUM SERPL-SCNC: 4 MMOL/L
SODIUM BLD-SCNC: 139 MMOL/L
TOTAL PROTEIN: 7 G/DL (ref 6.4–8.2)
TRIGL SERPL-MCNC: 107 MG/DL
VLDLC SERPL CALC-MCNC: NORMAL MG/DL

## 2025-04-21 ENCOUNTER — HOSPITAL ENCOUNTER (EMERGENCY)
Age: 45
Discharge: HOME | End: 2025-04-22
Attending: EMERGENCY MEDICINE
Payer: COMMERCIAL

## 2025-04-21 DIAGNOSIS — K59.00 CONSTIPATION, UNSPECIFIED CONSTIPATION TYPE: ICD-10-CM

## 2025-04-21 DIAGNOSIS — S29.012A STRAIN OF THORACIC BACK REGION: ICD-10-CM

## 2025-04-21 DIAGNOSIS — R10.12 LEFT UPPER QUADRANT ABDOMINAL PAIN: Primary | ICD-10-CM

## 2025-04-21 LAB
ALBUMIN SERPL-MCNC: 4.4 G/DL (ref 3.4–5)
ALBUMIN/GLOB SERPL: 1.7 {RATIO} (ref 1.1–2.2)
ALP SERPL-CCNC: 69 U/L (ref 40–129)
ALT SERPL-CCNC: 10 U/L (ref 10–40)
ANION GAP SERPL CALCULATED.3IONS-SCNC: 11 MMOL/L (ref 9–17)
AST SERPL-CCNC: 13 U/L (ref 15–37)
B-HCG SERPL EIA 3RD IS-ACNC: <1 MIU/ML
BASOPHILS # BLD: 0.04 K/UL
BASOPHILS NFR BLD: 1 % (ref 0–1)
BILIRUB SERPL-MCNC: 0.4 MG/DL (ref 0–1)
BUN SERPL-MCNC: 8 MG/DL (ref 7–20)
CALCIUM SERPL-MCNC: 9.4 MG/DL (ref 8.3–10.6)
CHLORIDE SERPL-SCNC: 103 MMOL/L (ref 99–110)
CO2 SERPL-SCNC: 23 MMOL/L (ref 21–32)
CREAT SERPL-MCNC: 0.7 MG/DL (ref 0.6–1.1)
EOSINOPHIL # BLD: 0.19 K/UL
EOSINOPHILS RELATIVE PERCENT: 3 % (ref 0–3)
ERYTHROCYTE [DISTWIDTH] IN BLOOD BY AUTOMATED COUNT: 17.2 % (ref 11.7–14.9)
GFR, ESTIMATED: >90 ML/MIN/1.73M2
GLUCOSE SERPL-MCNC: 254 MG/DL (ref 74–99)
HCT VFR BLD AUTO: 33.3 % (ref 37–47)
HGB BLD-MCNC: 10.6 G/DL (ref 12.5–16)
IMM GRANULOCYTES # BLD AUTO: 0.01 K/UL
IMM GRANULOCYTES NFR BLD: 0 %
LIPASE SERPL-CCNC: 38 U/L (ref 13–60)
LYMPHOCYTES NFR BLD: 2.45 K/UL
LYMPHOCYTES RELATIVE PERCENT: 39 % (ref 24–44)
MCH RBC QN AUTO: 25.9 PG (ref 27–31)
MCHC RBC AUTO-ENTMCNC: 31.8 G/DL (ref 32–36)
MCV RBC AUTO: 81.2 FL (ref 78–100)
MONOCYTES NFR BLD: 0.46 K/UL
MONOCYTES NFR BLD: 7 % (ref 0–5)
NEUTROPHILS NFR BLD: 50 % (ref 36–66)
NEUTS SEG NFR BLD: 3.14 K/UL
PLATELET # BLD AUTO: 304 K/UL (ref 140–440)
PMV BLD AUTO: 10 FL (ref 7.5–11.1)
POTASSIUM SERPL-SCNC: 4 MMOL/L (ref 3.5–5.1)
PROT SERPL-MCNC: 7 G/DL (ref 6.4–8.2)
RBC # BLD AUTO: 4.1 M/UL (ref 4.2–5.4)
SODIUM SERPL-SCNC: 138 MMOL/L (ref 136–145)
WBC OTHER # BLD: 6.3 K/UL (ref 4–10.5)

## 2025-04-21 PROCEDURE — 80053 COMPREHEN METABOLIC PANEL: CPT

## 2025-04-21 PROCEDURE — 85025 COMPLETE CBC W/AUTO DIFF WBC: CPT

## 2025-04-21 PROCEDURE — 99284 EMERGENCY DEPT VISIT MOD MDM: CPT

## 2025-04-21 PROCEDURE — 83690 ASSAY OF LIPASE: CPT

## 2025-04-21 PROCEDURE — 84702 CHORIONIC GONADOTROPIN TEST: CPT

## 2025-04-21 ASSESSMENT — PAIN SCALES - GENERAL: PAINLEVEL_OUTOF10: 6

## 2025-04-21 ASSESSMENT — PAIN DESCRIPTION - LOCATION: LOCATION: ABDOMEN;FLANK

## 2025-04-22 ENCOUNTER — APPOINTMENT (OUTPATIENT)
Dept: CT IMAGING | Age: 45
End: 2025-04-22
Payer: COMMERCIAL

## 2025-04-22 VITALS
DIASTOLIC BLOOD PRESSURE: 83 MMHG | TEMPERATURE: 98.6 F | WEIGHT: 240 LBS | OXYGEN SATURATION: 99 % | BODY MASS INDEX: 37.59 KG/M2 | RESPIRATION RATE: 16 BRPM | HEART RATE: 90 BPM | SYSTOLIC BLOOD PRESSURE: 117 MMHG

## 2025-04-22 PROCEDURE — 74176 CT ABD & PELVIS W/O CONTRAST: CPT

## 2025-04-22 ASSESSMENT — PAIN - FUNCTIONAL ASSESSMENT: PAIN_FUNCTIONAL_ASSESSMENT: 0-10

## 2025-04-22 ASSESSMENT — PAIN SCALES - GENERAL: PAINLEVEL_OUTOF10: 3

## 2025-04-22 NOTE — ED PROVIDER NOTES
CHIEF COMPLAINT    Chief Complaint   Patient presents with    Abdominal Pain     Abdominal pain x1 week    Flank Pain     Bilateral flank pain (more on L side) x1 week     HPI  Surekha Cota is a 45 y.o. female who presents to the ED with complaints of pain to left upper abdomen that radiates towards left upper back.  She states the pain intermittently is present in the right upper quadrant as well.  Pain described as throbbing and stabbing exacerbated predominantly with positional changes.  Nothing makes it better although she has not used any medications for this.  Pain started approximately 1 week ago.  She has noticed some mild constipation during this time but that has been relieved over the last 2 days and the pain has continued.  She denies fevers, chills, nausea, vomiting, dysuria      REVIEW OF SYSTEMS  Constitutional: No fever, chills  Eye: No visual changes  HENT: No earache or sore throat.  Resp: No SOB or productive cough.  Cardio: No chest pain or palpitations.  GI: Complains of abdominal pain.  No nausea, vomiting, diarrhea  : No dysuria, urgency or frequency.  Endocrine: No heat intolerance, no cold intolerance, no polydipsia   Lymphatics: No adenopathy  Musculoskeletal: No new muscle aches or joint pain.  Neuro: No headaches.  Psych: No homicidal or suicidal thoughts  Skin: No rash, No itching.  ?  ?  PAST MEDICAL HISTORY  Past Medical History:   Diagnosis Date    Anemia     Anxiety     Arthritis     \"Left Hip\"    Back pain     Esophagostomy hemorrhage (HCC)     Hiatal hernia     Hyperlipidemia     \"They Put Me On Lipitor Because I'm Diabetic, I Have Never Had High Cholesterol\"    Infertility, female     Pelvic pain     Shoulder pain, left     Sleep apnea     States does not have as of 9/11/23 - since losing weight    Teeth missing     Upper    Type 2 diabetes mellitus without complication     States does not have as of 9/11/23 - since losing weight    Wears glasses     Wears partial

## 2025-04-24 ENCOUNTER — OFFICE VISIT (OUTPATIENT)
Dept: OBGYN | Age: 45
End: 2025-04-24
Payer: COMMERCIAL

## 2025-04-24 VITALS
SYSTOLIC BLOOD PRESSURE: 126 MMHG | BODY MASS INDEX: 38.92 KG/M2 | DIASTOLIC BLOOD PRESSURE: 89 MMHG | WEIGHT: 248 LBS | HEIGHT: 67 IN | HEART RATE: 67 BPM

## 2025-04-24 DIAGNOSIS — N89.8 VAGINAL DISCHARGE: Primary | ICD-10-CM

## 2025-04-24 PROCEDURE — 99213 OFFICE O/P EST LOW 20 MIN: CPT

## 2025-04-24 ASSESSMENT — ENCOUNTER SYMPTOMS
CONSTIPATION: 0
VOMITING: 0
SHORTNESS OF BREATH: 0
GASTROINTESTINAL NEGATIVE: 1
CHEST TIGHTNESS: 0
DIARRHEA: 0
NAUSEA: 0
ABDOMINAL PAIN: 0
RESPIRATORY NEGATIVE: 1

## 2025-04-24 NOTE — PROGRESS NOTES
25    Surekha Cota  1980    Chief Complaint   Patient presents with    Annual Exam     Annual exam. Non-smoker No known h/o dvt. LMP 3/30/25..Periods are regular.Patient is sexually active. Patient is not on birth control. Pap Smear was  3/5/24 and results were negativeHPV negative. Patient had a recent mammogram 24 which was negative for malignancy. Patient complains of vaginal irritation x1 week, c/o of discharge, vaginal itching and states there is an odor, denies dysuria. Does not want annual exam. Would like to self swab.         The patient is a 45 y.o. female,  who presents for vaginal discharge, irritation, odor for about 1 week. Reports history of yeast infections. Denies concern for STI. She would like to self swab today. Denies urinary symptoms, fever, or chills.     Past Medical History:   Diagnosis Date    Anemia     Anxiety     Arthritis     \"Left Hip\"    Back pain     Esophagostomy hemorrhage (HCC)     Hiatal hernia     Hyperlipidemia     \"They Put Me On Lipitor Because I'm Diabetic, I Have Never Had High Cholesterol\"    Infertility, female     Pelvic pain     Shoulder pain, left     Sleep apnea     States does not have as of 23 - since losing weight    Teeth missing     Upper    Type 2 diabetes mellitus without complication (HCC)     States does not have as of 23 - since losing weight    Wears glasses     Wears partial dentures     Upper       Past Surgical History:   Procedure Laterality Date    BREAST BIOPSY Bilateral     Benign    COLONOSCOPY N/A 2023    COLONOSCOPY POLYPECTOMY SNARE/COLD BIOPSY performed by Danny Evans MD at [facility] OR    DENTAL SURGERY      Teeth Extracted In The Past    DILATION AND CURETTAGE OF UTERUS N/A 2023    DILATATION AND CURETTAGE HYSTEROSCOPY performed by Izaiah Hughes MD at [facility] OR    ENDOSCOPY, COLON, DIAGNOSTIC  2017    LAPAROSCOPY N/A 2023    LAPAROSCOPY EXPLORATORY performed by Izaiah Brooks

## 2025-04-25 ENCOUNTER — RESULTS FOLLOW-UP (OUTPATIENT)
Dept: OBGYN | Age: 45
End: 2025-04-25

## 2025-04-25 LAB
BV BACTERIA DNA VAG QL NAA+PROBE: NOT DETECTED
C GLABRATA DNA VAG QL NAA+PROBE: ABNORMAL
C GLABRATA DNA VAG QL NAA+PROBE: NOT DETECTED
C KRUSEI DNA VAG QL NAA+PROBE: NOT DETECTED
CANDIDA DNA VAG QL NAA+PROBE: DETECTED
T VAGINALIS DNA VAG QL NAA+PROBE: NOT DETECTED

## 2025-04-25 RX ORDER — FLUCONAZOLE 150 MG/1
TABLET ORAL
Qty: 2 TABLET | Refills: 0 | Status: SHIPPED | OUTPATIENT
Start: 2025-04-25

## 2025-06-16 ENCOUNTER — TELEPHONE (OUTPATIENT)
Dept: FAMILY MEDICINE CLINIC | Age: 45
End: 2025-06-16

## 2025-06-16 NOTE — TELEPHONE ENCOUNTER
Called patient to get her rescheduled. Couldn't leave vm due to mailbox being full. Will try again later.

## 2025-07-16 ENCOUNTER — TELEPHONE (OUTPATIENT)
Dept: ONCOLOGY | Age: 45
End: 2025-07-16

## 2025-07-16 NOTE — TELEPHONE ENCOUNTER
Called patient to reschedule their no show appointment on 7/16/25 at 11:15am with Dr. Sargent.  Unable to leave message for patient to return call to reschedule her appointment due to her voicemail being full.  Sending out a NO SHOW letter to patient by mail.

## 2025-08-06 ENCOUNTER — HOSPITAL ENCOUNTER (OUTPATIENT)
Dept: INFUSION THERAPY | Age: 45
Discharge: HOME | End: 2025-08-06
Payer: COMMERCIAL

## 2025-08-06 DIAGNOSIS — K90.9 MALABSORPTION OF IRON: ICD-10-CM

## 2025-08-06 DIAGNOSIS — D50.9 IRON DEFICIENCY ANEMIA, UNSPECIFIED IRON DEFICIENCY ANEMIA TYPE: ICD-10-CM

## 2025-08-06 LAB
25(OH)D3 SERPL-MCNC: 22.9 NG/ML (ref 30–150)
ALBUMIN SERPL-MCNC: 4.1 G/DL (ref 3.4–5)
ALBUMIN/GLOB SERPL: 1.4 {RATIO} (ref 1.1–2.2)
ALP SERPL-CCNC: 56 U/L (ref 40–129)
ALT SERPL-CCNC: 9 U/L (ref 10–40)
ANION GAP SERPL CALCULATED.3IONS-SCNC: 9 MMOL/L (ref 9–17)
AST SERPL-CCNC: 12 U/L (ref 15–37)
BASOPHILS # BLD: 0.02 K/UL
BASOPHILS NFR BLD: 0 % (ref 0–1)
BILIRUB SERPL-MCNC: 0.4 MG/DL (ref 0–1)
BUN SERPL-MCNC: 7 MG/DL (ref 7–20)
CALCIUM SERPL-MCNC: 9.1 MG/DL (ref 8.3–10.6)
CHLORIDE SERPL-SCNC: 102 MMOL/L (ref 99–110)
CO2 SERPL-SCNC: 23 MMOL/L (ref 21–32)
CREAT SERPL-MCNC: 0.7 MG/DL (ref 0.6–1.1)
EOSINOPHIL # BLD: 0.13 K/UL
EOSINOPHILS RELATIVE PERCENT: 2 % (ref 0–3)
ERYTHROCYTE [DISTWIDTH] IN BLOOD BY AUTOMATED COUNT: 17.1 % (ref 11.7–14.9)
FERRITIN SERPL-MCNC: 5 NG/ML (ref 15–150)
FOLATE SERPL-MCNC: 12.6 NG/ML (ref 4.8–24.2)
GFR, ESTIMATED: >90 ML/MIN/1.73M2
GLUCOSE SERPL-MCNC: 257 MG/DL (ref 74–99)
HCT VFR BLD AUTO: 27.9 % (ref 37–47)
HGB BLD-MCNC: 8.5 G/DL (ref 12.5–16)
IRON SATN MFR SERPL: 3 % (ref 15–50)
IRON SERPL-MCNC: 11 UG/DL (ref 37–145)
LYMPHOCYTES NFR BLD: 2.1 K/UL
LYMPHOCYTES RELATIVE PERCENT: 33 % (ref 24–44)
MCH RBC QN AUTO: 21 PG (ref 27–31)
MCHC RBC AUTO-ENTMCNC: 30.5 G/DL (ref 32–36)
MCV RBC AUTO: 68.9 FL (ref 78–100)
MONOCYTES NFR BLD: 0.43 K/UL
MONOCYTES NFR BLD: 7 % (ref 0–5)
NEUTROPHILS NFR BLD: 58 % (ref 36–66)
NEUTS SEG NFR BLD: 3.64 K/UL
PLATELET # BLD AUTO: 280 K/UL (ref 140–440)
PMV BLD AUTO: 9.1 FL (ref 7.5–11.1)
POTASSIUM SERPL-SCNC: 3.8 MMOL/L (ref 3.5–5.1)
PROT SERPL-MCNC: 6.9 G/DL (ref 6.4–8.2)
RBC # BLD AUTO: 4.05 M/UL (ref 4.2–5.4)
SODIUM SERPL-SCNC: 134 MMOL/L (ref 136–145)
TIBC SERPL-MCNC: 383 UG/DL (ref 260–445)
UNSATURATED IRON BINDING CAPACITY: 372 UG/DL (ref 110–370)
VIT B12 SERPL-MCNC: 390 PG/ML (ref 211–911)
WBC OTHER # BLD: 6.3 K/UL (ref 4–10.5)

## 2025-08-06 PROCEDURE — 85025 COMPLETE CBC W/AUTO DIFF WBC: CPT

## 2025-08-06 PROCEDURE — 82746 ASSAY OF FOLIC ACID SERUM: CPT

## 2025-08-06 PROCEDURE — 83550 IRON BINDING TEST: CPT

## 2025-08-06 PROCEDURE — 36415 COLL VENOUS BLD VENIPUNCTURE: CPT

## 2025-08-06 PROCEDURE — 82607 VITAMIN B-12: CPT

## 2025-08-06 PROCEDURE — 82306 VITAMIN D 25 HYDROXY: CPT

## 2025-08-06 PROCEDURE — 82728 ASSAY OF FERRITIN: CPT

## 2025-08-06 PROCEDURE — 83540 ASSAY OF IRON: CPT

## 2025-08-06 PROCEDURE — 80053 COMPREHEN METABOLIC PANEL: CPT

## 2025-08-13 ENCOUNTER — HOSPITAL ENCOUNTER (OUTPATIENT)
Dept: INFUSION THERAPY | Age: 45
Discharge: HOME | End: 2025-08-13
Payer: COMMERCIAL

## 2025-08-13 ENCOUNTER — OFFICE VISIT (OUTPATIENT)
Dept: ONCOLOGY | Age: 45
End: 2025-08-13
Payer: COMMERCIAL

## 2025-08-13 VITALS
OXYGEN SATURATION: 100 % | WEIGHT: 250 LBS | HEIGHT: 67 IN | DIASTOLIC BLOOD PRESSURE: 79 MMHG | HEART RATE: 85 BPM | BODY MASS INDEX: 39.24 KG/M2 | SYSTOLIC BLOOD PRESSURE: 112 MMHG | TEMPERATURE: 98 F

## 2025-08-13 DIAGNOSIS — K90.9 MALABSORPTION OF IRON: Primary | ICD-10-CM

## 2025-08-13 DIAGNOSIS — Z12.31 OTHER SCREENING MAMMOGRAM: ICD-10-CM

## 2025-08-13 DIAGNOSIS — D50.9 IRON DEFICIENCY ANEMIA, UNSPECIFIED IRON DEFICIENCY ANEMIA TYPE: ICD-10-CM

## 2025-08-13 PROCEDURE — 99214 OFFICE O/P EST MOD 30 MIN: CPT | Performed by: INTERNAL MEDICINE

## 2025-08-13 PROCEDURE — 99212 OFFICE O/P EST SF 10 MIN: CPT

## 2025-08-15 DIAGNOSIS — E53.8 B12 DEFICIENCY: Primary | ICD-10-CM

## 2025-08-15 RX ORDER — ALBUTEROL SULFATE 90 UG/1
4 INHALANT RESPIRATORY (INHALATION) PRN
OUTPATIENT
Start: 2025-08-15

## 2025-08-15 RX ORDER — SODIUM CHLORIDE 9 MG/ML
INJECTION, SOLUTION INTRAVENOUS PRN
OUTPATIENT
Start: 2025-08-15

## 2025-08-15 RX ORDER — HYDROCORTISONE SODIUM SUCCINATE 100 MG/2ML
100 INJECTION INTRAMUSCULAR; INTRAVENOUS
OUTPATIENT
Start: 2025-08-15

## 2025-08-15 RX ORDER — CYANOCOBALAMIN 1000 UG/ML
1000 INJECTION, SOLUTION INTRAMUSCULAR; SUBCUTANEOUS ONCE
OUTPATIENT
Start: 2025-08-15 | End: 2025-08-15

## 2025-08-15 RX ORDER — EPINEPHRINE 1 MG/ML
0.3 INJECTION, SOLUTION, CONCENTRATE INTRAVENOUS PRN
OUTPATIENT
Start: 2025-08-15

## 2025-08-15 RX ORDER — ACETAMINOPHEN 325 MG/1
650 TABLET ORAL
OUTPATIENT
Start: 2025-08-15

## 2025-08-15 RX ORDER — DIPHENHYDRAMINE HYDROCHLORIDE 50 MG/ML
50 INJECTION, SOLUTION INTRAMUSCULAR; INTRAVENOUS
OUTPATIENT
Start: 2025-08-15

## 2025-08-15 RX ORDER — FAMOTIDINE 10 MG/ML
20 INJECTION, SOLUTION INTRAVENOUS
OUTPATIENT
Start: 2025-08-15

## 2025-08-15 RX ORDER — ONDANSETRON 2 MG/ML
8 INJECTION INTRAMUSCULAR; INTRAVENOUS
OUTPATIENT
Start: 2025-08-15

## 2025-08-18 DIAGNOSIS — E55.9 VITAMIN D DEFICIENCY: ICD-10-CM

## 2025-08-18 RX ORDER — ERGOCALCIFEROL 1.25 MG/1
CAPSULE, LIQUID FILLED ORAL
Qty: 12 CAPSULE | Refills: 0 | OUTPATIENT
Start: 2025-08-18

## 2025-08-18 RX ORDER — DIPHENHYDRAMINE HYDROCHLORIDE 50 MG/ML
50 INJECTION, SOLUTION INTRAMUSCULAR; INTRAVENOUS
OUTPATIENT
Start: 2025-08-18

## 2025-08-18 RX ORDER — ONDANSETRON 2 MG/ML
8 INJECTION INTRAMUSCULAR; INTRAVENOUS
OUTPATIENT
Start: 2025-08-18

## 2025-08-18 RX ORDER — MEPERIDINE HYDROCHLORIDE 50 MG/ML
12.5 INJECTION INTRAMUSCULAR; INTRAVENOUS; SUBCUTANEOUS PRN
OUTPATIENT
Start: 2025-08-18

## 2025-08-18 RX ORDER — SODIUM CHLORIDE 0.9 % (FLUSH) 0.9 %
5-40 SYRINGE (ML) INJECTION PRN
OUTPATIENT
Start: 2025-08-18

## 2025-08-18 RX ORDER — FAMOTIDINE 10 MG/ML
20 INJECTION, SOLUTION INTRAVENOUS
OUTPATIENT
Start: 2025-08-18

## 2025-08-18 RX ORDER — SODIUM CHLORIDE 9 MG/ML
5-250 INJECTION, SOLUTION INTRAVENOUS PRN
OUTPATIENT
Start: 2025-08-18

## 2025-08-18 RX ORDER — HEPARIN SODIUM (PORCINE) LOCK FLUSH IV SOLN 100 UNIT/ML 100 UNIT/ML
500 SOLUTION INTRAVENOUS PRN
OUTPATIENT
Start: 2025-08-18

## 2025-08-18 RX ORDER — SODIUM CHLORIDE 9 MG/ML
INJECTION, SOLUTION INTRAVENOUS PRN
OUTPATIENT
Start: 2025-08-18

## 2025-08-18 RX ORDER — ALBUTEROL SULFATE 90 UG/1
4 INHALANT RESPIRATORY (INHALATION) PRN
OUTPATIENT
Start: 2025-08-18

## 2025-08-18 RX ORDER — ACETAMINOPHEN 325 MG/1
650 TABLET ORAL ONCE
OUTPATIENT
Start: 2025-08-18 | End: 2025-08-18

## 2025-08-18 RX ORDER — EPINEPHRINE 1 MG/ML
0.3 INJECTION, SOLUTION, CONCENTRATE INTRAVENOUS PRN
OUTPATIENT
Start: 2025-08-18

## 2025-08-18 RX ORDER — HYDROCORTISONE SODIUM SUCCINATE 100 MG/2ML
100 INJECTION INTRAMUSCULAR; INTRAVENOUS
OUTPATIENT
Start: 2025-08-18

## 2025-08-18 RX ORDER — ACETAMINOPHEN 325 MG/1
650 TABLET ORAL
OUTPATIENT
Start: 2025-08-18

## 2025-09-03 ENCOUNTER — OFFICE VISIT (OUTPATIENT)
Age: 45
End: 2025-09-03
Payer: COMMERCIAL

## 2025-09-03 VITALS
SYSTOLIC BLOOD PRESSURE: 110 MMHG | BODY MASS INDEX: 38.77 KG/M2 | WEIGHT: 247 LBS | DIASTOLIC BLOOD PRESSURE: 70 MMHG | HEIGHT: 67 IN

## 2025-09-03 DIAGNOSIS — N97.9 SECONDARY FEMALE INFERTILITY: Primary | ICD-10-CM

## 2025-09-03 PROCEDURE — 99213 OFFICE O/P EST LOW 20 MIN: CPT | Performed by: OBSTETRICS & GYNECOLOGY

## 2025-09-03 ASSESSMENT — ENCOUNTER SYMPTOMS
SHORTNESS OF BREATH: 0
ABDOMINAL PAIN: 0

## 2025-09-04 ENCOUNTER — OFFICE VISIT (OUTPATIENT)
Dept: FAMILY MEDICINE CLINIC | Age: 45
End: 2025-09-04

## 2025-09-04 ENCOUNTER — HOSPITAL ENCOUNTER (OUTPATIENT)
Dept: GENERAL RADIOLOGY | Age: 45
Discharge: HOME | End: 2025-09-04

## 2025-09-04 VITALS
OXYGEN SATURATION: 98 % | DIASTOLIC BLOOD PRESSURE: 84 MMHG | HEART RATE: 84 BPM | WEIGHT: 248.8 LBS | SYSTOLIC BLOOD PRESSURE: 128 MMHG | BODY MASS INDEX: 38.97 KG/M2

## 2025-09-04 DIAGNOSIS — N89.8 VAGINAL ITCHING: Primary | ICD-10-CM

## 2025-09-04 DIAGNOSIS — G89.29 CHRONIC LEFT SI JOINT PAIN: ICD-10-CM

## 2025-09-04 DIAGNOSIS — E53.8 B12 DEFICIENCY: ICD-10-CM

## 2025-09-04 DIAGNOSIS — M25.552 BILATERAL HIP PAIN: ICD-10-CM

## 2025-09-04 DIAGNOSIS — M25.561 CHRONIC PAIN OF RIGHT KNEE: ICD-10-CM

## 2025-09-04 DIAGNOSIS — M53.3 CHRONIC LEFT SI JOINT PAIN: ICD-10-CM

## 2025-09-04 DIAGNOSIS — D50.0 IRON DEFICIENCY ANEMIA SECONDARY TO BLOOD LOSS (CHRONIC): ICD-10-CM

## 2025-09-04 DIAGNOSIS — E55.9 VITAMIN D DEFICIENCY: ICD-10-CM

## 2025-09-04 DIAGNOSIS — M25.551 BILATERAL HIP PAIN: ICD-10-CM

## 2025-09-04 DIAGNOSIS — E11.9 TYPE II DIABETES MELLITUS WITH HBA1C GOAL TO BE DETERMINED (HCC): ICD-10-CM

## 2025-09-04 DIAGNOSIS — G89.29 CHRONIC PAIN OF RIGHT KNEE: ICD-10-CM

## 2025-09-04 LAB
BILIRUBIN, POC: NORMAL
BLOOD URINE, POC: NORMAL
CLARITY, POC: NORMAL
COLOR, POC: NORMAL
GLUCOSE URINE, POC: 500 MG/DL
HBA1C MFR BLD: 6.4 %
KETONES, POC: NORMAL MG/DL
LEUKOCYTE EST, POC: NORMAL
NITRITE, POC: NORMAL
PH, POC: 6
PROTEIN, POC: NORMAL MG/DL
SPECIFIC GRAVITY, POC: 1.03
UROBILINOGEN, POC: 2 MG/DL

## 2025-09-04 RX ORDER — FLUCONAZOLE 150 MG/1
150 TABLET ORAL ONCE
Qty: 1 TABLET | Refills: 0 | Status: SHIPPED | OUTPATIENT
Start: 2025-09-04 | End: 2025-09-04

## 2025-09-05 LAB
BV BACTERIA DNA VAG QL NAA+PROBE: NOT DETECTED
C GLABRATA DNA VAG QL NAA+PROBE: ABNORMAL
C GLABRATA DNA VAG QL NAA+PROBE: NOT DETECTED
C KRUSEI DNA VAG QL NAA+PROBE: NOT DETECTED
CANDIDA DNA VAG QL NAA+PROBE: DETECTED
T VAGINALIS DNA VAG QL NAA+PROBE: NOT DETECTED

## (undated) DEVICE — CATHETER,URETHRAL,VINYL,MALE,16",16 FR: Brand: MEDLINE

## (undated) DEVICE — GOWN,ECLIPSE,POLYRNF,BRTHSLV,L,30/CS: Brand: MEDLINE

## (undated) DEVICE — LINER,SEMI-RIGID,3000CC,50EA/CS: Brand: MEDLINE

## (undated) DEVICE — ELECTRODE ES AD CRDLSS PT RET REM POLYHESIVE

## (undated) DEVICE — COUNTER NDL 30 COUNT FOAM STRP SGL MAG

## (undated) DEVICE — ENDOSCOPY KIT: Brand: MEDLINE INDUSTRIES, INC.

## (undated) DEVICE — SUTURE MCRYL SZ 4-0 L27IN ABSRB UD L24MM PS-1 3/8 CIR PRIM Y935H

## (undated) DEVICE — TELFA NON-ADHERENT ABSORBENT DRESSING: Brand: TELFA

## (undated) DEVICE — GOWN,ISOLATION,MICROPOROUS,LV3,WHT,XL: Brand: MEDLINE

## (undated) DEVICE — ENTERAL SYRINGE: Brand: MONOJECT

## (undated) DEVICE — [HIGH FLOW INSUFFLATOR,  DO NOT USE IF PACKAGE IS DAMAGED,  KEEP DRY,  KEEP AWAY FROM SUNLIGHT,  PROTECT FROM HEAT AND RADIOACTIVE SOURCES.]: Brand: PNEUMOSURE

## (undated) DEVICE — SYRINGE MED 10ML LUERLOCK TIP W/O SFTY DISP

## (undated) DEVICE — TROCAR ENDOSCP L150MM DIA5MM STBL SL BLDELSS ENDOPATH XCEL

## (undated) DEVICE — KIT ANTIFOG SOL 6GM IPA DISP FOR ENDOSCP PROC FRED DEXIDE

## (undated) DEVICE — SUTURE MCRYL SZ 4-0 L18IN ABSRB UD L19MM PS-2 3/8 CIR PRIM Y496G

## (undated) DEVICE — TOWEL,OR,DSP,ST,BLUE,STD,6/PK,12PK/CS: Brand: MEDLINE

## (undated) DEVICE — MARKER SURG SKIN UTIL REGULAR/FINE 2 TIP W/ RUL AND 9 LBL

## (undated) DEVICE — TROCAR: Brand: KII FIOS FIRST ENTRY

## (undated) DEVICE — FORCEPS BX L240CM JAW DIA2.8MM L CAP W/ NDL MIC MESH TOOTH

## (undated) DEVICE — TROCAR: Brand: KII® SLEEVE

## (undated) DEVICE — ELECTRODE PT RET AD L9FT HI MOIST COND ADH HYDRGEL CORDED

## (undated) DEVICE — NEEDLE HYPO 23GA L1.5IN TURQ POLYPR HUB S STL THN WALL IM

## (undated) DEVICE — GLOVE ORANGE PI 7   MSG9070

## (undated) DEVICE — GLOVE ORANGE PI 8   MSG9080

## (undated) DEVICE — Z INACTIVE USE 2863041 SPONGE GZ W4XL4IN 100% COT 16 PLY RADPQ HIGHLY ABSRB

## (undated) DEVICE — BASIC PACK: Brand: CONVERTORS

## (undated) DEVICE — LEGGINGS, PAIR, CLEAR, STERILE: Brand: MEDLINE

## (undated) DEVICE — PACK,BASIC,IX: Brand: MEDLINE

## (undated) DEVICE — Z DISCONTINUED NEEDLE HYPO 22GA L1.5IN BLK POLYPR HUB S STL REG BVL STR - SEE COMMENT

## (undated) DEVICE — SHEET,DRAPE,UNDERBUTTOCK,GRAD POUCH,PORT: Brand: MEDLINE

## (undated) DEVICE — MANIPULATOR 6.7MM RUMI UTERINE 6CM STER

## (undated) DEVICE — PAD,ABDOMINAL,8"X10",ST,LF: Brand: MEDLINE

## (undated) DEVICE — 500ML,PRESSURE INFUSER W/STOPCOCK: Brand: MEDLINE

## (undated) DEVICE — Z INACTIVE COVER MAYO STAND CLTH

## (undated) DEVICE — MATERNITY PAD,HEAVY: Brand: CURITY

## (undated) DEVICE — TIP IU L8CM DIA6.7MM BLU SIL FLX DISP RUMI II

## (undated) DEVICE — BAG SPEC REM 224ML W4XL6IN DIA10MM 1 HND GYN DISP ENDOPCH

## (undated) DEVICE — SET IRRIG L94IN ID0.281IN W/ 4.5IN DST FLX CONN 2 LD ON OFF

## (undated) DEVICE — TUBING INSUFFLATOR HEAT HI FLO SET PNEUMOCLEAR

## (undated) DEVICE — SNARE VASC L240CM LOOP W10MM SHTH DIA2.4MM RND STIFF CLD

## (undated) DEVICE — SOLUTION IV 1000ML 0.9% SOD CHL FOR IRRIG PLAS CONT

## (undated) DEVICE — SYRINGE, LUER LOCK, 10ML: Brand: MEDLINE

## (undated) DEVICE — SEALER ENDOSCP L37CM NANO COAT BLNT TIP LAP DIV

## (undated) DEVICE — DRAPE, LAVH, STERILE: Brand: MEDLINE

## (undated) DEVICE — TRAY PREP DRY W/ PREM GLV 2 APPL 6 SPNG 2 UNDPD 1 OVERWRAP

## (undated) DEVICE — GLOVE ORANGE PI 7 1/2   MSG9075

## (undated) DEVICE — BLADE SCALPEL 11 STRL SURGIPATH

## (undated) DEVICE — Z INACTIVE NO ACTIVE SUPPLIER APPLICATOR MEDICATED 26 CC TINT HI-LITE ORNG STRL CHLORAPREP

## (undated) DEVICE — INTENDED FOR TISSUE SEPARATION, AND OTHER PROCEDURES THAT REQUIRE A SHARP SURGICAL BLADE TO PUNCTURE OR CUT.: Brand: BARD-PARKER ® STAINLESS STEEL BLADES

## (undated) DEVICE — SOLUTION IRRIG 500ML 0.9% SOD CHLO USP POUR PLAS BTL

## (undated) DEVICE — BANDAGE ADH W1XL3IN NAT FAB WVN FLX DURABLE N ADH PD SEAL

## (undated) DEVICE — GLOVE SURG SZ 65 THK91MIL LTX FREE SYN POLYISOPRENE

## (undated) DEVICE — SOLUTION 1000ML NRML NACL